# Patient Record
Sex: FEMALE | Race: WHITE | NOT HISPANIC OR LATINO | Employment: OTHER | URBAN - METROPOLITAN AREA
[De-identification: names, ages, dates, MRNs, and addresses within clinical notes are randomized per-mention and may not be internally consistent; named-entity substitution may affect disease eponyms.]

---

## 2017-01-25 ENCOUNTER — OFFICE VISIT (OUTPATIENT)
Dept: DERMATOLOGY | Facility: CLINIC | Age: 62
End: 2017-01-25

## 2017-01-25 DIAGNOSIS — R21 RASH: Primary | ICD-10-CM

## 2017-01-25 DIAGNOSIS — R21 RASH: ICD-10-CM

## 2017-01-25 RX ORDER — ACYCLOVIR 800 MG/1
TABLET ORAL
COMMUNITY
Start: 2016-09-17 | End: 2017-05-05

## 2017-01-25 ASSESSMENT — PAIN SCALES - GENERAL: PAINLEVEL: NO PAIN (0)

## 2017-01-25 NOTE — MR AVS SNAPSHOT
After Visit Summary   1/25/2017    Ludy Ramos    MRN: [id number]           Patient Information     Date Of Birth          1955        Visit Information        Provider Department      1/25/2017 1:30 PM Diamond Linares PA-C [facility] Dermatology        Today's Diagnoses     Rash    -  1       Care Instructions    Cryotherapy    What is it?    Use of a very cold liquid, such as liquid nitrogen, to freeze and destroy abnormal skin cells that need to be removed    What should I expect?    Tenderness and redness    A small blister that might grow and fill with dark purple blood. There may be crusting.    More than one treatment may be needed if the lesions do not go away.    How do I care for the treated area?    Gently wash the area with your hands when bathing.    Use a thin layer of Vaseline to help with healing. You may use a Band-Aid.     The area should heal within 7-10 days and may leave behind a pink or lighter color.     Do not use an antibiotic or Neosporin ointment.     You may take acetaminophen (Tylenol) for pain.     Call your Doctor if you have:    Severe pain    Signs of infection (warmth, redness, cloudy yellow drainage, and or a bad smell)    Questions or concerns    Who should I call with questions?       [facility]: [phone]       [facility]: [phone]       For urgent needs outside of business hours call the [facility] at [phone]        and ask for the dermatology resident on call          Follow-ups after your visit        Your next 10 appointments already scheduled     Jan 25, 2017  2:15 PM   LAB with [facility] ([facility])    [address] [place] [address] [phone]           Patient must bring picture ID.  Patient should be prepared to give a urine specimen  Please do not eat 10-12 hours before your appointment if  you are coming in fasting for labs on lipids, cholesterol, or glucose (sugar).  Pregnant women should follow their Care Team instructions. Water with medications is okay. Do not drink coffee or other fluids.   If you have concerns about taking  your medications, please ask at office or if scheduling via [employer], send a message by clicking on Secure Messaging, Message Your Care Team.              Future tests that were ordered for you today     Open Future Orders        Priority Expected Expires Ordered    HSV IgM antibody Routine  1/26/2018 1/25/2017    Herpes Simplex Virus 1 and 2 IgG Routine  1/26/2018 1/25/2017            Who to contact     Please call your clinic at [phone] to:    Ask questions about your health    Make or cancel appointments    Discuss your medicines    Learn about your test results    Speak to your doctor   If you have compliments or concerns about an experience at your clinic, or if you wish to file a complaint, please contact [facility] Patient Relations at [phone] or email us at [email or web address]         Additional Information About Your Visit        [employer] Information     [employer] gives you secure access to your electronic health record. If you see a primary care provider, you can also send messages to your care team and make appointments. If you have questions, please call your primary care clinic.  If you do not have a primary care provider, please call [phone] and they will assist you.      [employer] is an electronic gateway that provides easy, online access to your medical records. With [employer], you can request a clinic appointment, read your test results, renew a prescription or communicate with your care team.     To access your existing account, please contact your [facility] or call [phone] for assistance.        Care EveryWhere ID     This is your Care EveryWhere ID. This could be used by  other organizations to access your [place] medical records  [id number]         Blood Pressure from Last 3 Encounters:   12/07/16 136/84   10/31/16 115/71   09/15/16 138/86    Weight from Last 3 Encounters:   12/07/16 78.472 kg (173 lb)   10/31/16 76.658 kg (169 lb)   09/15/16 76.658 kg (169 lb)              We Performed the Following     Skin Culture Aerobic Bacterial     Viral culture        Primary Care Provider Office Phone # Fax #    Javan Canela [phone]       [facility] [address] [facility] [address]        Thank you!     Thank you for choosing [facility] DERMATOLOGY  for your care. Our goal is always to provide you with excellent care. Hearing back from our patients is one way we can continue to improve our services. Please take a few minutes to complete the written survey that you may receive in the mail after your visit with us. Thank you!             Your Updated Medication List - Protect others around you: Learn how to safely use, store and throw away your medicines at www.disposemymeds.org.          This list is accurate as of: 1/25/17  2:01 PM.  Always use your most recent med list.                   Brand Name Dispense Instructions for use    acyclovir 800 MG tablet    ZOVIRAX         albuterol 108 (90 BASE) MCG/ACT Inhaler    PROAIR HFA/PROVENTIL HFA/VENTOLIN HFA    1 Inhaler    Inhale 2 puffs into the lungs every 4 hours as needed for shortness of breath / dyspnea       citalopram 20 MG tablet    celeXA    90 tablet    Take 1 tablet (20 mg) by mouth daily       hydrochlorothiazide 25 MG tablet    HYDRODIURIL    90 tablet    Take 1 tablet (25 mg) by mouth daily       lisinopril 10 MG tablet    PRINIVIL/ZESTRIL    90 tablet    Take 1 tablet (10 mg) by mouth daily       * mometasone-formoterol 100-5 MCG/ACT oral inhaler    DULERA    1 Inhaler    Inhale 2 puffs into the lungs 2 times daily       * mometasone-formoterol 200-5 MCG/ACT oral inhaler     DULERA    13 g    Inhale 2 puffs into the lungs 2 times daily       traZODone 100 MG tablet    DESYREL    180 tablet    Take 2 tablets (200 mg) by mouth At Bedtime       * Notice:  This list has 2 medication(s) that are the same as other medications prescribed for you. Read the directions carefully, and ask your doctor or other care provider to review them with you.

## 2017-01-25 NOTE — LETTER
1/25/2017      RE: Ludy Ramos  [address] [place] [facility] [address]       PT was seen for a rash. Note is unavailable.     Diamond Linares PA-C

## 2017-01-25 NOTE — PATIENT INSTRUCTIONS
Cryotherapy    What is it?    Use of a very cold liquid, such as liquid nitrogen, to freeze and destroy abnormal skin cells that need to be removed    What should I expect?    Tenderness and redness    A small blister that might grow and fill with dark purple blood. There may be crusting.    More than one treatment may be needed if the lesions do not go away.    How do I care for the treated area?    Gently wash the area with your hands when bathing.    Use a thin layer of Vaseline to help with healing. You may use a Band-Aid.     The area should heal within 7-10 days and may leave behind a pink or lighter color.     Do not use an antibiotic or Neosporin ointment.     You may take acetaminophen (Tylenol) for pain.     Call your Doctor if you have:    Severe pain    Signs of infection (warmth, redness, cloudy yellow drainage, and or a bad smell)    Questions or concerns    Who should I call with questions?       [facility]: [phone]       [facility]: [phone]       For urgent needs outside of business hours call the [facility] at [phone]        and ask for the dermatology resident on call

## 2017-01-25 NOTE — NURSING NOTE
Dermatology Rooming Note    Ludy Ramos's goals for this visit include:   Chief Complaint   Patient presents with     Derm Problem     Ludy comes to clinic for areas of irritation on her face     Mariama Lacey LPN

## 2017-01-25 NOTE — LETTER
1/25/2017       RE: Ludy Ramos  [address]  [place] [facility] [address]     Dear Colleague,    Thank you for referring your patient, Ludy Ramos, to the [facility] DERMATOLOGY at [facility]. Please see a copy of my visit note below.    PT was seen for a rash. Note is unavailable.     Again, thank you for allowing me to participate in the care of your patient.      Sincerely,    Diamond Linares PA-C

## 2017-01-26 LAB
HSV1 IGG SERPL QL IA: 8 AI (ref 0–0.8)
HSV2 IGG SERPL QL IA: ABNORMAL AI (ref 0–0.8)

## 2017-01-27 LAB
BACTERIA SPEC CULT: NORMAL
Lab: NORMAL
MICRO REPORT STATUS: NORMAL
SPECIMEN SOURCE: NORMAL

## 2017-01-31 LAB — HSV 1+2 IGM SER-IMP: 0.64 INDEX VALUE (ref 0–0.89)

## 2017-02-01 LAB
SPECIMEN SOURCE: NORMAL
STATUS - QUEST: NORMAL
VIRUS SPEC CULT: NORMAL

## 2017-02-02 DIAGNOSIS — L71.0 DERMATITIS, PERIORAL: Primary | ICD-10-CM

## 2017-02-02 RX ORDER — MINOCYCLINE HYDROCHLORIDE 100 MG/1
100 CAPSULE ORAL 2 TIMES DAILY
Qty: 60 CAPSULE | Refills: 2 | Status: SHIPPED | OUTPATIENT
Start: 2017-02-02 | End: 2018-06-08

## 2017-02-09 ENCOUNTER — TELEPHONE (OUTPATIENT)
Dept: FAMILY MEDICINE | Facility: CLINIC | Age: 62
End: 2017-02-09

## 2017-02-09 DIAGNOSIS — Z86.0100 HISTORY OF COLONIC POLYPS: Primary | ICD-10-CM

## 2017-02-09 NOTE — TELEPHONE ENCOUNTER
Patient is calling to check with pcp if she is due for a colonoscopy and can he put in referral for this  Please call to discuss   Thank you

## 2017-02-09 NOTE — TELEPHONE ENCOUNTER
Patient is due for colonoscopy in May of 2017 pended referral, please sign and route back to WILIAM HERNANDEZ for TC to contact ptNathan Fishman,

## 2017-03-27 DIAGNOSIS — R10.13 ABDOMINAL PAIN, EPIGASTRIC: ICD-10-CM

## 2017-03-27 DIAGNOSIS — I10 ESSENTIAL HYPERTENSION WITH GOAL BLOOD PRESSURE LESS THAN 140/90: ICD-10-CM

## 2017-03-27 RX ORDER — HYDROCHLOROTHIAZIDE 25 MG/1
25 TABLET ORAL DAILY
Qty: 90 TABLET | Refills: 0 | Status: SHIPPED | OUTPATIENT
Start: 2017-03-27 | End: 2017-09-20

## 2017-03-27 NOTE — TELEPHONE ENCOUNTER
Routing refill request to provider for review/approval because:  Drug not active on patient's medication list.     Medication could not be refilled per [facility] RN protocol.   Chanda Honeycutt RN   [facility]

## 2017-03-30 ENCOUNTER — OFFICE VISIT (OUTPATIENT)
Dept: FAMILY MEDICINE | Facility: CLINIC | Age: 62
End: 2017-03-30
Payer: COMMERCIAL

## 2017-03-30 VITALS
WEIGHT: 176.6 LBS | HEART RATE: 104 BPM | TEMPERATURE: 98.1 F | OXYGEN SATURATION: 95 % | BODY MASS INDEX: 27.66 KG/M2 | DIASTOLIC BLOOD PRESSURE: 69 MMHG | SYSTOLIC BLOOD PRESSURE: 106 MMHG

## 2017-03-30 DIAGNOSIS — J06.9 UPPER RESPIRATORY TRACT INFECTION, UNSPECIFIED TYPE: Primary | ICD-10-CM

## 2017-03-30 PROBLEM — Z71.89 ADVANCED DIRECTIVES, COUNSELING/DISCUSSION: Status: ACTIVE | Noted: 2017-03-30

## 2017-03-30 PROCEDURE — 99213 OFFICE O/P EST LOW 20 MIN: CPT | Performed by: FAMILY MEDICINE

## 2017-03-30 RX ORDER — AZITHROMYCIN 250 MG/1
TABLET, FILM COATED ORAL
Qty: 6 TABLET | Refills: 0 | Status: SHIPPED | OUTPATIENT
Start: 2017-03-30 | End: 2017-04-26

## 2017-03-30 ASSESSMENT — ENCOUNTER SYMPTOMS
RHINORRHEA: 1
WHEEZING: 1
CHILLS: 0
SHORTNESS OF BREATH: 0
COUGH: 1
SORE THROAT: 1
SWEATS: 1

## 2017-03-30 NOTE — NURSING NOTE
"Chief Complaint   Patient presents with     Cough     x 2 weeks, nausea        Initial /69  Pulse 104  Temp 98.1  F (36.7  C) (Oral)  Wt 176 lb 9.6 oz (80.1 kg)  SpO2 95%  BMI 27.66 kg/m2 Estimated body mass index is 27.66 kg/(m^2) as calculated from the following:    Height as of 3/24/16: 5' 7\" (1.702 m).    Weight as of this encounter: 176 lb 9.6 oz (80.1 kg).  Medication Reconciliation: complete  Ezra Downs CMA    "

## 2017-03-30 NOTE — MR AVS SNAPSHOT
After Visit Summary   3/30/2017    Ludy Ramos    MRN: [id number]           Patient Information     Date Of Birth          1955        Visit Information        Provider Department      3/30/2017 2:00 PM Javan Canela MD [facility]        Today's Diagnoses     Upper respiratory tract infection, unspecified type    -  1       Follow-ups after your visit        Your next 10 appointments already scheduled     May 05, 2017   Procedure with William Charles Duane, MD   [facility] (--)    [address] [place] [address] [phone]              Who to contact     If you have questions or need follow up information about today's clinic visit or your schedule please contact [facility] directly at [phone].  Normal or non-critical lab and imaging results will be communicated to you by MyChart, letter or phone within 4 business days after the clinic has received the results. If you do not hear from us within 7 days, please contact the clinic through MyChart or phone. If you have a critical or abnormal lab result, we will notify you by phone as soon as possible.  Submit refill requests through [facility] or call your pharmacy and they will forward the refill request to us. Please allow 3 business days for your refill to be completed.          Additional Information About Your Visit        MyChart Information     [facility] gives you secure access to your electronic health record. If you see a primary care provider, you can also send messages to your care team and make appointments. If you have questions, please call your primary care clinic.  If you do not have a primary care provider, please call [phone] and they will assist you.        Care EveryWhere ID     This is your Care EveryWhere ID. This could be used by other organizations to access your [place] medical records  [id number]        Your Vitals Were     Pulse Temperature  Pulse Oximetry BMI (Body Mass Index)          104 98.1  F (36.7  C) (Oral) 95% 27.66 kg/m2         Blood Pressure from Last 3 Encounters:   03/30/17 106/69   12/07/16 136/84   10/31/16 115/71    Weight from Last 3 Encounters:   03/30/17 176 lb 9.6 oz (80.1 kg)   12/07/16 173 lb (78.5 kg)   10/31/16 169 lb (76.7 kg)              We Performed the Following     Asthma Action Plan (AAP)          Today's Medication Changes          These changes are accurate as of: 3/30/17  2:47 PM.  If you have any questions, ask your nurse or doctor.               Start taking these medicines.        Dose/Directions    azithromycin 250 MG tablet   Commonly known as:  ZITHROMAX   Used for:  Upper respiratory tract infection, unspecified type   Started by:  Javan Canela MD        Two tablets first day, then one tablet daily for four days.   Quantity:  6 tablet   Refills:  0         These medicines have changed or have updated prescriptions.        Dose/Directions    mometasone-formoterol 200-5 MCG/ACT oral inhaler   Commonly known as:  DULERA   This may have changed:  Another medication with the same name was removed. Continue taking this medication, and follow the directions you see here.   Used for:  Moderate persistent asthma, uncomplicated   Changed by:  Javan Canela MD        Dose:  2 puff   Inhale 2 puffs into the lungs 2 times daily   Quantity:  13 g   Refills:  1            Where to get your medicines      These medications were sent to [facility] [address] [place] [address] [facility]  [address] [facility] [address]     Phone:  [phone]     azithromycin 250 MG tablet                Primary Care Provider Office Phone # Fax #    Javan Canela [phone]       [facility] [address] [facility] [address]        Thank you!     Thank you for choosing [facility]  for your care. Our goal is always to provide you with excellent  care. Hearing back from our patients is one way we can continue to improve our services. Please take a few minutes to complete the written survey that you may receive in the mail after your visit with us. Thank you!             Your Updated Medication List - Protect others around you: Learn how to safely use, store and throw away your medicines at www.disposemymeds.org.          This list is accurate as of: 3/30/17  2:47 PM.  Always use your most recent med list.                   Brand Name Dispense Instructions for use    acyclovir 800 MG tablet    ZOVIRAX         albuterol 108 (90 BASE) MCG/ACT Inhaler    PROAIR HFA/PROVENTIL HFA/VENTOLIN HFA    1 Inhaler    Inhale 2 puffs into the lungs every 4 hours as needed for shortness of breath / dyspnea       azithromycin 250 MG tablet    ZITHROMAX    6 tablet    Two tablets first day, then one tablet daily for four days.       citalopram 20 MG tablet    celeXA    90 tablet    Take 1 tablet (20 mg) by mouth daily       hydrochlorothiazide 25 MG tablet    HYDRODIURIL    90 tablet    Take 1 tablet (25 mg) by mouth daily Due for follow up appointment for further refills.       lisinopril 10 MG tablet    PRINIVIL/ZESTRIL    90 tablet    Take 1 tablet (10 mg) by mouth daily       minocycline 100 MG capsule    MINOCIN/DYNACIN    60 capsule    Take 1 capsule (100 mg) by mouth 2 times daily       mometasone-formoterol 200-5 MCG/ACT oral inhaler    DULERA    13 g    Inhale 2 puffs into the lungs 2 times daily       omeprazole 20 MG CR capsule    priLOSEC    30 capsule    Take 1 capsule (20 mg) by mouth daily       traZODone 100 MG tablet    DESYREL    180 tablet    Take 2 tablets (200 mg) by mouth At Bedtime

## 2017-03-30 NOTE — LETTER
My Asthma Action Plan  Name: Ludy Ramos   YOB: 1955  Date: 3/30/2017   My doctor: Javan Canela MD   My clinic: [facility]        My Control Medicine: { :496258}  My Rescue Medicine: { :605464}  {AAP include Oral Steroid:422185} My Asthma Severity: { :881308}  Avoid your asthma triggers: { :055712}        {Is patient a child or adult?:562576:::0}       GREEN ZONE     Good Control    I feel good    No cough or wheeze    Can work, sleep and play without asthma symptoms       Take your asthma control medicine every day.     1. If exercise triggers your asthma, take your rescue medication    15 minutes before exercise or sports, and    During exercise if you have asthma symptoms  2. Spacer to use with inhaler: If you have a spacer, make sure to use it with your inhaler             YELLOW ZONE     Getting Worse  I have ANY of these:    I do not feel good    Cough or wheeze    Chest feels tight    Wake up at night   1. Keep taking your Green Zone medications  2. Start taking your rescue medicine:    every 20 minutes for up to 1 hour. Then every 4 hours for 24-48 hours.  3. If you stay in the Yellow Zone for more than 12-24 hours, contact your doctor.  4. If you do not return to the Green Zone in 12-24 hours or you get worse, start taking your oral steroid medicine if prescribed by your provider.           RED ZONE     Medical Alert - Get Help  I have ANY of these:    I feel awful    Medicine is not helping    Breathing getting harder    Trouble walking or talking    Nose opens wide to breathe       1. Take your rescue medicine NOW  2. If your provider has prescribed an oral steroid medicine, start taking it NOW  3. Call your doctor NOW  4. If you are still in the Red Zone after 20 minutes and you have not reached your doctor:    Take your rescue medicine again and    Call 911 or go to the emergency room right away    See your regular doctor within 2 weeks of an Emergency Room or  Urgent Care visit for follow-up treatment.        Electronically signed by: Ezra Rocha, March 30, 2017    Annual Reminders:  Meet with Asthma Educator,  Flu Shot in the Fall, consider Pneumonia Vaccination for patients with asthma (aged 19 and older).    Pharmacy:    [facility] [place] [place] [facility] [address] [facility]  WRITTEN PRESCRIPTION REQUESTED  [facility] [address] [facility] - [place], [place] - [address] [place] [facility]                    Asthma Triggers  How To Control Things That Make Your Asthma Worse    Triggers are things that make your asthma worse.  Look at the list below to help you find your triggers and what you can do about them.  You can help prevent asthma flare-ups by staying away from your triggers.      Trigger                                                          What you can do   Cigarette Smoke  Tobacco smoke can make asthma worse. Do not allow smoking in your home, car or around you.  Be sure no one smokes at a child s day care or school.  If you smoke, ask your health care provider for ways to help you quit.  Ask family members to quit too.  Ask your health care provider for a referral to Quit Plan to help you quit smoking, or call 3-359-202-PLAN.     Colds, Flu, Bronchitis  These are common triggers of asthma. Wash your hands often.  Don t touch your eyes, nose or mouth.  Get a flu shot every year.     Dust Mites  These are tiny bugs that live in cloth or carpet. They are too small to see. Wash sheets and blankets in hot water every week.   Encase pillows and mattress in dust mite proof covers.  Avoid having carpet if you can. If you have carpet, vacuum weekly.   Use a dust mask and HEPA vacuum.   Pollen and Outdoor Mold  Some people are allergic to trees, grass, or weed pollen, or molds. Try to keep your windows closed.  Limit time out doors when pollen count is high.   Ask  you health care provider about taking medicine during allergy season.     Animal Dander  Some people are allergic to skin flakes, urine or saliva from pets with fur or feathers. Keep pets with fur or feathers out of your home.    If you can t keep the pet outdoors, then keep the pet out of your bedroom.  Keep the bedroom door closed.  Keep pets off cloth furniture and away from stuffed toys.     Mice, Rats, and Cockroaches  Some people are allergic to the waste from these pests.   Cover food and garbage.  Clean up spills and food crumbs.  Store grease in the refrigerator.   Keep food out of the bedroom.   Indoor Mold  This can be a trigger if your home has high moisture. Fix leaking faucets, pipes, or other sources of water.   Clean moldy surfaces.  Dehumidify basement if it is damp and smelly.   Smoke, Strong Odors, and Sprays  These can reduce air quality. Stay away from strong odors and sprays, such as perfume, powder, hair spray, paints, smoke incense, paint, cleaning products, candles and new carpet.   Exercise or Sports  Some people with asthma have this trigger. Be active!  Ask your doctor about taking medicine before sports or exercise to prevent symptoms.    Warm up for 5-10 minutes before and after sports or exercise.     Other Triggers of Asthma  Cold air:  Cover your nose and mouth with a scarf.  Sometimes laughing or crying can be a trigger.  Some medicines and food can trigger asthma.

## 2017-03-30 NOTE — PROGRESS NOTES
Cough   This is a new problem. Episode onset: 2 weeks. The cough is productive of sputum. Associated symptoms include sweats, rhinorrhea, sore throat and wheezing. Pertinent negatives include no chills and no shortness of breath.         Review of Systems   Constitutional: Negative for chills.   HENT: Positive for rhinorrhea and sore throat.    Respiratory: Positive for cough and wheezing. Negative for shortness of breath.      OBJECTIVE:  no apparent distress  /69  Pulse 104  Temp 98.1  F (36.7  C) (Oral)  Wt 176 lb 9.6 oz (80.1 kg)  SpO2 95%  BMI 27.66 kg/m2       Physical Exam   Constitutional: She is well-developed, well-nourished, and in no distress.   HENT:   Head: Normocephalic and atraumatic.   Right Ear: External ear normal.   Left Ear: External ear normal.   Cardiovascular: Normal rate, regular rhythm and normal heart sounds.    Pulmonary/Chest: Effort normal and breath sounds normal.         ICD-10-CM    1. Upper respiratory tract infection, unspecified type J06.9 azithromycin (ZITHROMAX) 250 MG tablet    PLAN:Retun to clinic.

## 2017-04-07 DIAGNOSIS — I10 ESSENTIAL HYPERTENSION WITH GOAL BLOOD PRESSURE LESS THAN 140/90: ICD-10-CM

## 2017-04-07 RX ORDER — LISINOPRIL 10 MG/1
10 TABLET ORAL DAILY
Qty: 30 TABLET | Refills: 0 | Status: SHIPPED | OUTPATIENT
Start: 2017-04-07 | End: 2017-04-25

## 2017-04-25 ENCOUNTER — MYC MEDICAL ADVICE (OUTPATIENT)
Dept: FAMILY MEDICINE | Facility: CLINIC | Age: 62
End: 2017-04-25

## 2017-04-25 DIAGNOSIS — I10 ESSENTIAL HYPERTENSION WITH GOAL BLOOD PRESSURE LESS THAN 140/90: ICD-10-CM

## 2017-04-25 DIAGNOSIS — F41.9 ANXIETY: ICD-10-CM

## 2017-04-25 NOTE — TELEPHONE ENCOUNTER
Patient has not been seen for anxiety in over 1 year.  HTN not addressed at last office visit a few weeks ago although BP was good.    Routing to provider to advise.  Aisha Izquierdo RN

## 2017-04-26 DIAGNOSIS — I10 ESSENTIAL HYPERTENSION WITH GOAL BLOOD PRESSURE LESS THAN 140/90: ICD-10-CM

## 2017-04-26 RX ORDER — CITALOPRAM HYDROBROMIDE 20 MG/1
20 TABLET ORAL DAILY
Qty: 90 TABLET | Refills: 0 | Status: SHIPPED | OUTPATIENT
Start: 2017-04-26 | End: 2017-06-28

## 2017-04-26 RX ORDER — HYDROCHLOROTHIAZIDE 25 MG/1
25 TABLET ORAL DAILY
Qty: 90 TABLET | Refills: 0 | Status: CANCELLED | OUTPATIENT
Start: 2017-04-26

## 2017-04-26 RX ORDER — LISINOPRIL 10 MG/1
10 TABLET ORAL DAILY
Qty: 90 TABLET | Refills: 0 | Status: SHIPPED | OUTPATIENT
Start: 2017-04-26 | End: 2017-09-20

## 2017-04-26 RX ORDER — LISINOPRIL 10 MG/1
TABLET ORAL
Qty: 30 TABLET | Refills: 0 | OUTPATIENT
Start: 2017-04-26

## 2017-05-05 ENCOUNTER — SURGERY (OUTPATIENT)
Age: 62
End: 2017-05-05

## 2017-05-05 ENCOUNTER — HOSPITAL ENCOUNTER (OUTPATIENT)
Facility: AMBULATORY SURGERY CENTER | Age: 62
Discharge: HOME | End: 2017-05-05
Attending: INTERNAL MEDICINE | Admitting: INTERNAL MEDICINE
Payer: COMMERCIAL

## 2017-05-05 VITALS
TEMPERATURE: 98.3 F | SYSTOLIC BLOOD PRESSURE: 102 MMHG | OXYGEN SATURATION: 95 % | RESPIRATION RATE: 13 BRPM | DIASTOLIC BLOOD PRESSURE: 69 MMHG

## 2017-05-05 PROCEDURE — 88305 TISSUE EXAM BY PATHOLOGIST: CPT | Performed by: INTERNAL MEDICINE

## 2017-05-05 PROCEDURE — G8907 PT DOC NO EVENTS ON DISCHARG: HCPCS

## 2017-05-05 PROCEDURE — 45380 COLONOSCOPY AND BIOPSY: CPT

## 2017-05-05 PROCEDURE — G8918 PT W/O PREOP ORDER IV AB PRO: HCPCS

## 2017-05-05 RX ORDER — ONDANSETRON 2 MG/ML
4 INJECTION INTRAMUSCULAR; INTRAVENOUS
Status: DISCONTINUED | OUTPATIENT
Start: 2017-05-05 | End: 2017-05-06 | Stop reason: HOSPADM

## 2017-05-05 RX ORDER — LIDOCAINE 40 MG/G
CREAM TOPICAL
Status: DISCONTINUED | OUTPATIENT
Start: 2017-05-05 | End: 2017-05-06 | Stop reason: HOSPADM

## 2017-05-05 RX ORDER — FENTANYL CITRATE 50 UG/ML
INJECTION, SOLUTION INTRAMUSCULAR; INTRAVENOUS PRN
Status: DISCONTINUED | OUTPATIENT
Start: 2017-05-05 | End: 2017-05-05 | Stop reason: HOSPADM

## 2017-05-05 RX ADMIN — FENTANYL CITRATE 100 MCG: 50 INJECTION, SOLUTION INTRAMUSCULAR; INTRAVENOUS at 09:35

## 2017-05-05 RX ADMIN — FENTANYL CITRATE 50 MCG: 50 INJECTION, SOLUTION INTRAMUSCULAR; INTRAVENOUS at 09:45

## 2017-05-09 LAB — COPATH REPORT: NORMAL

## 2017-05-17 ENCOUNTER — TELEPHONE (OUTPATIENT)
Dept: DERMATOLOGY | Facility: CLINIC | Age: 62
End: 2017-05-17

## 2017-05-17 NOTE — TELEPHONE ENCOUNTER
----- Message from Diamond Linares PA-C sent at 5/17/2017  7:09 AM CDT -----  Yes,   Please. I had a message for this patient. I wanted to see her back after a month of medication.    Thanks, Diamond  ----- Message -----     From: Francine Montoya CMA     Sent: 5/16/2017   3:36 PM       To: CARISSA Urbina, You seen this patient back in January and she had a culture done and the results were sent via [employer] and she still has not read them. Would you like for us to follow up with the patient and give her the results or so you think it is fine?

## 2017-05-17 NOTE — TELEPHONE ENCOUNTER
I called Ludy to follow up with her culture results that were sent via [employer] but not read. Ludy says she is doing fine and also declined a follow up appointment.

## 2017-05-24 DIAGNOSIS — G47.00 PERSISTENT INSOMNIA: ICD-10-CM

## 2017-05-25 RX ORDER — TRAZODONE HYDROCHLORIDE 100 MG/1
TABLET ORAL
Qty: 180 TABLET | Refills: 0 | Status: SHIPPED | OUTPATIENT
Start: 2017-05-25 | End: 2017-06-28

## 2017-05-31 LAB — COLONOSCOPY: NORMAL

## 2017-06-28 ENCOUNTER — OFFICE VISIT (OUTPATIENT)
Dept: FAMILY MEDICINE | Facility: CLINIC | Age: 62
End: 2017-06-28
Payer: COMMERCIAL

## 2017-06-28 VITALS
OXYGEN SATURATION: 96 % | SYSTOLIC BLOOD PRESSURE: 133 MMHG | DIASTOLIC BLOOD PRESSURE: 87 MMHG | TEMPERATURE: 97 F | BODY MASS INDEX: 27.4 KG/M2 | HEIGHT: 67 IN | WEIGHT: 174.6 LBS | HEART RATE: 99 BPM

## 2017-06-28 DIAGNOSIS — J45.40 MODERATE PERSISTENT ASTHMA, UNCOMPLICATED: ICD-10-CM

## 2017-06-28 DIAGNOSIS — I10 ESSENTIAL HYPERTENSION WITH GOAL BLOOD PRESSURE LESS THAN 140/90: ICD-10-CM

## 2017-06-28 DIAGNOSIS — F41.9 ANXIETY: ICD-10-CM

## 2017-06-28 DIAGNOSIS — G47.00 PERSISTENT INSOMNIA: ICD-10-CM

## 2017-06-28 PROCEDURE — 99214 OFFICE O/P EST MOD 30 MIN: CPT | Performed by: FAMILY MEDICINE

## 2017-06-28 RX ORDER — CITALOPRAM HYDROBROMIDE 20 MG/1
20 TABLET ORAL DAILY
Qty: 90 TABLET | Refills: 1 | Status: SHIPPED | OUTPATIENT
Start: 2017-06-28 | End: 2018-02-23

## 2017-06-28 RX ORDER — ALBUTEROL SULFATE 90 UG/1
2 AEROSOL, METERED RESPIRATORY (INHALATION) EVERY 4 HOURS PRN
Qty: 1 INHALER | Refills: 3 | Status: SHIPPED | OUTPATIENT
Start: 2017-06-28 | End: 2018-07-11

## 2017-06-28 RX ORDER — TRAZODONE HYDROCHLORIDE 100 MG/1
TABLET ORAL
Qty: 180 TABLET | Refills: 0 | Status: SHIPPED | OUTPATIENT
Start: 2017-06-28 | End: 2017-09-25

## 2017-06-28 RX ORDER — HYDROCHLOROTHIAZIDE 25 MG/1
25 TABLET ORAL DAILY
Qty: 90 TABLET | Refills: 0 | Status: CANCELLED | OUTPATIENT
Start: 2017-06-28

## 2017-06-28 RX ORDER — LISINOPRIL/HYDROCHLOROTHIAZIDE 10-12.5 MG
1 TABLET ORAL DAILY
Qty: 90 TABLET | Refills: 1 | Status: SHIPPED | OUTPATIENT
Start: 2017-06-28 | End: 2017-10-02

## 2017-06-28 RX ORDER — LISINOPRIL 10 MG/1
10 TABLET ORAL DAILY
Qty: 90 TABLET | Refills: 0 | Status: CANCELLED | OUTPATIENT
Start: 2017-06-28

## 2017-06-28 ASSESSMENT — ANXIETY QUESTIONNAIRES
GAD7 TOTAL SCORE: 0
6. BECOMING EASILY ANNOYED OR IRRITABLE: NOT AT ALL
2. NOT BEING ABLE TO STOP OR CONTROL WORRYING: NOT AT ALL
IF YOU CHECKED OFF ANY PROBLEMS ON THIS QUESTIONNAIRE, HOW DIFFICULT HAVE THESE PROBLEMS MADE IT FOR YOU TO DO YOUR WORK, TAKE CARE OF THINGS AT HOME, OR GET ALONG WITH OTHER PEOPLE: NOT DIFFICULT AT ALL
3. WORRYING TOO MUCH ABOUT DIFFERENT THINGS: NOT AT ALL
1. FEELING NERVOUS, ANXIOUS, OR ON EDGE: NOT AT ALL
7. FEELING AFRAID AS IF SOMETHING AWFUL MIGHT HAPPEN: NOT AT ALL
5. BEING SO RESTLESS THAT IT IS HARD TO SIT STILL: NOT AT ALL

## 2017-06-28 ASSESSMENT — PATIENT HEALTH QUESTIONNAIRE - PHQ9: 5. POOR APPETITE OR OVEREATING: NOT AT ALL

## 2017-06-28 NOTE — PROGRESS NOTES
SUBJECTIVE:  61 year oldyear old female enters with a hx of hypretension.  Pt. Has been compliant with medications and medications were reviewed.  No side effects. No chest pain or sob. Low sodium diet.    Current Outpatient Prescriptions:      traZODone (DESYREL) 100 MG tablet, TAKE TWO TABLETS BY MOUTH AT BEDTIME, Disp: 180 tablet, Rfl: 0     lisinopril (PRINIVIL/ZESTRIL) 10 MG tablet, Take 1 tablet (10 mg) by mouth daily, Disp: 90 tablet, Rfl: 0     citalopram (CELEXA) 20 MG tablet, Take 1 tablet (20 mg) by mouth daily, Disp: 90 tablet, Rfl: 0     omeprazole (PRILOSEC) 20 MG CR capsule, Take 1 capsule (20 mg) by mouth daily, Disp: 30 capsule, Rfl: 1     hydrochlorothiazide (HYDRODIURIL) 25 MG tablet, Take 1 tablet (25 mg) by mouth daily Due for follow up appointment for further refills., Disp: 90 tablet, Rfl: 0     minocycline (MINOCIN/DYNACIN) 100 MG capsule, Take 1 capsule (100 mg) by mouth 2 times daily, Disp: 60 capsule, Rfl: 2     albuterol (PROAIR HFA, PROVENTIL HFA, VENTOLIN HFA) 108 (90 BASE) MCG/ACT inhaler, Inhale 2 puffs into the lungs every 4 hours as needed for shortness of breath / dyspnea, Disp: 1 Inhaler, Rfl: 3     mometasone-formoterol (DULERA) 200-5 MCG/ACT oral inhaler, Inhale 2 puffs into the lungs 2 times daily, Disp: 13 g, Rfl: 1  Past Medical History:   Diagnosis Date     Generalized anxiety disorder      Hypertension goal BP (blood pressure) < 140/90      Insomnia      Intermittent asthma      Menopausal state      [facility]      Hospital Outpatient Visit on 05/05/2017   Component Date Value Ref Range Status     Copath Report 05/05/2017    Final                    Value:Patient Name: BRYNN VICTOR  MR#: [id number]  Specimen #: [id number]  Collected: 5/5/2017  Received: 5/8/2017  Reported: 5/9/2017 11:45  Ordering Phy(s): WILLIAM CHARLES DUANE    For improved result formatting, select 'View Enhanced Report Format'  under Linked Documents section.    SPECIMEN(S):  A: Splenic flexure  "polyp  B: Hepatic flexure polyp    FINAL DIAGNOSIS:  A. COLON, SPLENIC FLEXURE POLYP, POLYPECTOMY:  - Tubular adenoma, fragmented  - Negative for high-grade dysplasia and invasive malignancy    B. COLON, HEPATIC FLEXURE POLYP x 2, POLYPECTOMIES:  - Tubular adenoma, fragmented  - Negative for high-grade dysplasia and invasive malignancy    I have personally reviewed all specimens and or slides, including the  listed special stains, and used them with my medical judgement to  determine the final diagnosis.    Electronically signed out by:    Javan Bertrand M.D    CLINICAL HISTORY:  High risk colon cancer surveillance: Personal history of colonic polyps    GROSS:  A. The spe                          cimen is received in formalin with proper patient's  identification, labeled \"splenic flexure polyp\" and consists of three  tan soft tissue fragments measuring 0.3 cm, 0.2 cm and 0.1 cm in maximum  dimension. Entirely submitted in one cassette.    B. The specimen is received in formalin with proper patient's  identification, labeled \"hepatic flexure polyp\" and consists of two  brown tan soft tissue fragments measuring 0.4 cm and 0.3 cm in maximal  dimension. Entirely submitted in one cassette. (Dictated by: Alfonso Mcconnell 5/8/2017 08:42 AM)    MICROSCOPIC:  Microscopic examination was performed.    CPT Codes:  A: 14770-KH9  B: 21165-TJ7    TESTING LAB LOCATION:  [facility], [address]   [address]  [phone]    COLLECTION SITE:  Client: [facility]  Location: [facility] (B)       COLONOSCOPY 05/05/2017    Corrected                    Value:[facility]  Endoscopy Department-[place]  _______________________________________________________________________________  Patient Name: Ludy Ramos           Procedure Date: 5/5/2017 9:24 AM  MRN: [id number]                       Date of " Birth: 1955  Admit Type: Outpatient                Age: 61  Gender: Female                        Note Status: Supervisor Override  Attending MD: William C. Duane ,      Total Sedation Time:   _______________________________________________________________________________     Procedure:                Colonoscopy  Indications:              High risk colon cancer surveillance: Personal                             history of colonic polyps  Providers:                William C. Duane, Toshia Mckeon RN  Referring MD:             Javan Canela MD  Medicines:                Fentanyl 150 micrograms IV, Midazolam 3 mg IV  Complications:            No immediate complications.  ______________________________________________                          _________________________________  Procedure:                Pre-Anesthesia Assessment:                            - Prior to the procedure, a History and Physical                             was performed, and patient medications and                             allergies were reviewed. The risks and benefits of                             the procedure and the sedation options and risks                             were discussed with the patient. All questions were                             answered and informed consent was obtained. Patient                             identification and proposed procedure were verified                             by the physician and the nurse in the pre-procedure                             area. Mental Status Examination: alert and                             oriented. Airway Examination: normal oropharyngeal                             airway and neck mobility. Respiratory Examination:                             clear to auscultation. CV Examin                          ation: normal.                             Prophylactic Antibiotics: The patient does not                             require prophylactic antibiotics. Prior                              Anticoagulants: The patient has taken no previous                             anticoagulant or antiplatelet agents. ASA Grade                             Assessment: II - A patient with mild systemic                             disease. After reviewing the risks and benefits,                             the patient was deemed in satisfactory condition to                             undergo the procedure. The anesthesia plan was to                             use moderate sedation / analgesia (conscious                             sedation). This assessment was completed before the                             administration of sedation at 09:20 AM.                            After obtaining informed consent, the colonoscope                             was passed under direct vision. Throughout the                                                       procedure, the patient's blood pressure, pulse, and                             oxygen saturations were monitored continuously. The                             Colonoscope was introduced through the anus and                             advanced to the terminal ileum. The colonoscopy was                             somewhat difficult due to significant looping and a                             tortuous colon. The patient tolerated the procedure                             fairly well. The quality of the bowel preparation                             was good.                                                                                   Findings:       A 3 mm polyp was found in the splenic flexure. The polyp was sessile.        The polyp was removed with a cold biopsy forceps. Resection and        retrieval were complete.       Two sessile polyps were found in the hepatic flexure. The polyps were 3        to 4 mm in size. These polyps were removed with a cold bio                          psy forceps.        Resection and retrieval were complete.         "                                                                           Impression:               - One 3 mm polyp at the splenic flexure, removed                             with a cold biopsy forceps. Resected and retrieved.                            - Two 3 to 4 mm polyps at the hepatic flexure,                             removed with a cold biopsy forceps. Resected and                             retrieved.  Recommendation:           - Await pathology results.                            - If the pathology report reveals adenomatous                             tissue in all three polyps, then repeat the                             colonoscopy for surveillance in 3 years. Otherwise                             repeat in 5 years. Recommend doing that procedure                             under monitored anesthesia care (MAC)                                                                                     ________                          __________  William C. Duane,   5/5/2017 10:23:35 AM  I was physically present for the entire viewing portion of the exam.William Duane  Number of Addenda: 0    Note Initiated On: 5/5/2017 9:24 AM  Scope In:  Scope Out:        Reviewed health maintenance  Patient Active Problem List   Diagnosis     Insomnia     CARDIOVASCULAR SCREENING; LDL GOAL LESS THAN 130     Hypertension goal BP (blood pressure) < 140/90     Anxiety     Moderate persistent asthma     History of colonic polyps     Clavicle fracture     Closed displaced fracture of proximal phalanx of left great toe     Chest wall pain     Thoracic back pain     Advanced directives, counseling/discussion         OBJECTIVE:  no apparent distress  /87  Pulse 99  Temp 97  F (36.1  C) (Oral)  Ht 5' 7\" (1.702 m)  Wt 174 lb 9.6 oz (79.2 kg)  SpO2 96%  BMI 27.35 kg/m2     Head: Normocephalic. No masses, lesions, tenderness or abnormalities.  Neck::Neck supple. No adenopathy. Thyroid symmetric, normal " "size.    Cardiovascular: negative. No lifts, heaves, or thrills. RRR. No murmurs, clicks gallops or rubs  Respiratory. Good diaphragmatic excursion. Lungs clear  Gastrointestinal:Abdomen soft, non-tender.  No masses, organomegaly    Hospital Outpatient Visit on 05/05/2017   Component Date Value Ref Range Status     Copath Report 05/05/2017    Final                    Value:Patient Name: BRYNN VICTOR  MR#: [id number]  Specimen #: [id number]  Collected: 5/5/2017  Received: 5/8/2017  Reported: 5/9/2017 11:45  Ordering Phy(s): WILLIAM CHARLES DUANE    For improved result formatting, select 'View Enhanced Report Format'  under Linked Documents section.    SPECIMEN(S):  A: Splenic flexure polyp  B: Hepatic flexure polyp    FINAL DIAGNOSIS:  A. COLON, SPLENIC FLEXURE POLYP, POLYPECTOMY:  - Tubular adenoma, fragmented  - Negative for high-grade dysplasia and invasive malignancy    B. COLON, HEPATIC FLEXURE POLYP x 2, POLYPECTOMIES:  - Tubular adenoma, fragmented  - Negative for high-grade dysplasia and invasive malignancy    I have personally reviewed all specimens and or slides, including the  listed special stains, and used them with my medical judgement to  determine the final diagnosis.    Electronically signed out by:    Javan Bertrand M.D    CLINICAL HISTORY:  High risk colon cancer surveillance: Personal history of colonic polyps    GROSS:  A. The spe                          cimen is received in formalin with proper patient's  identification, labeled \"splenic flexure polyp\" and consists of three  tan soft tissue fragments measuring 0.3 cm, 0.2 cm and 0.1 cm in maximum  dimension. Entirely submitted in one cassette.    B. The specimen is received in formalin with proper patient's  identification, labeled \"hepatic flexure polyp\" and consists of two  brown tan soft tissue fragments measuring 0.4 cm and 0.3 cm in maximal  dimension. Entirely submitted in one cassette. (Dictated by: Alfonso Mcconnell 5/8/2017 08:42 " AM)    MICROSCOPIC:  Microscopic examination was performed.    CPT Codes:  A: 27128-RD4  B: 26695-JI8    TESTING LAB LOCATION:  [facility], [address]   [address] [phone]    COLLECTION SITE:  Client: [facility] [place]  Location: [facility] (B)       COLONOSCOPY 05/05/2017    Corrected                    Value:[facility]  Endoscopy Department-[place]  _______________________________________________________________________________  Patient Name: Ludy Ramos           Procedure Date: 5/5/2017 9:24 AM  MRN: [id number]                       YOB: 1955  Admit Type: Outpatient                Age: 61  Gender: Female                        Note Status: Supervisor Override  Attending MD: William C. Duane ,      Total Sedation Time:   _______________________________________________________________________________     Procedure:                Colonoscopy  Indications:              High risk colon cancer surveillance: Personal                             history of colonic polyps  Providers:                William C. Duane, Toshia Mckeon RN  Referring MD:             Javan Canela MD  Medicines:                Fentanyl 150 micrograms IV, Midazolam 3 mg IV  Complications:            No immediate complications.  ______________________________________________                          _________________________________  Procedure:                Pre-Anesthesia Assessment:                            - Prior to the procedure, a History and Physical                             was performed, and patient medications and                             allergies were reviewed. The risks and benefits of                             the procedure and the sedation options and risks                             were discussed with the patient. All questions were                              answered and informed consent was obtained. Patient                             identification and proposed procedure were verified                             by the physician and the nurse in the pre-procedure                             area. Mental Status Examination: alert and                             oriented. Airway Examination: normal oropharyngeal                             airway and neck mobility. Respiratory Examination:                             clear to auscultation. CV Examin                          ation: normal.                             Prophylactic Antibiotics: The patient does not                             require prophylactic antibiotics. Prior                             Anticoagulants: The patient has taken no previous                             anticoagulant or antiplatelet agents. ASA Grade                             Assessment: II - A patient with mild systemic                             disease. After reviewing the risks and benefits,                             the patient was deemed in satisfactory condition to                             undergo the procedure. The anesthesia plan was to                             use moderate sedation / analgesia (conscious                             sedation). This assessment was completed before the                             administration of sedation at 09:20 AM.                            After obtaining informed consent, the colonoscope                             was passed under direct vision. Throughout the                                                       procedure, the patient's blood pressure, pulse, and                             oxygen saturations were monitored continuously. The                             Colonoscope was introduced through the anus and                             advanced to the terminal ileum. The colonoscopy was                             somewhat difficult due to significant looping and a                              tortuous colon. The patient tolerated the procedure                             fairly well. The quality of the bowel preparation                             was good.                                                                                   Findings:       A 3 mm polyp was found in the splenic flexure. The polyp was sessile.        The polyp was removed with a cold biopsy forceps. Resection and        retrieval were complete.       Two sessile polyps were found in the hepatic flexure. The polyps were 3        to 4 mm in size. These polyps were removed with a cold bio                          psy forceps.        Resection and retrieval were complete.                                                                                   Impression:               - One 3 mm polyp at the splenic flexure, removed                             with a cold biopsy forceps. Resected and retrieved.                            - Two 3 to 4 mm polyps at the hepatic flexure,                             removed with a cold biopsy forceps. Resected and                             retrieved.  Recommendation:           - Await pathology results.                            - If the pathology report reveals adenomatous                             tissue in all three polyps, then repeat the                             colonoscopy for surveillance in 3 years. Otherwise                             repeat in 5 years. Recommend doing that procedure                             under monitored anesthesia care (MAC)                                                                                     ________                          __________  William C. Duane,   5/5/2017 10:23:35 AM  I was physically present for the entire viewing portion of the exam.William Duane  Number of Addenda: 0    Note Initiated On: 5/5/2017 9:24 AM  Scope In:  Scope Out:           ICD-10-CM    1. Essential hypertension with goal blood  "pressure less than 140/90 I10    2. Anxiety F41.9 citalopram (CELEXA) 20 MG tablet   3. Moderate persistent asthma, uncomplicated J45.40 albuterol (PROAIR HFA/PROVENTIL HFA/VENTOLIN HFA) 108 (90 BASE) MCG/ACT Inhaler     mometasone-formoterol (DULERA) 200-5 MCG/ACT oral inhaler   4. Persistent insomnia G47.00 traZODone (DESYREL) 100 MG tablet    PLAN: Follow up in 6 months          SUBJECTIVE:  SUBJECTIVE:  61 year old.The patient has a history of  follow-up of depressive symptoms.    Since last visit the patient has doing well.  Notes from that visit reviewed. PHQ-9 has been reviewed.  Current symptoms include: Depressed Mood   Symptoms that have subjectively improved include: Depressed Mood  Previous and current treatment modalities employed include: Medications   Celexa (Citalopram)  Patient Active Problem List   Diagnosis     Insomnia     CARDIOVASCULAR SCREENING; LDL GOAL LESS THAN 130     Hypertension goal BP (blood pressure) < 140/90     Anxiety     Moderate persistent asthma     History of colonic polyps     Clavicle fracture     Closed displaced fracture of proximal phalanx of left great toe     Chest wall pain     Thoracic back pain     Advanced directives, counseling/discussion      Reviewed health maintenance  OBJECTIVE:  no apparent distress  /87  Pulse 99  Temp 97  F (36.1  C) (Oral)  Ht 5' 7\" (1.702 m)  Wt 174 lb 9.6 oz (79.2 kg)  SpO2 96%  BMI 27.35 kg/m2       MENTAL STATUS EXAM:   1. Clinical observations:Patient was clean and was adequately groomed. Patient's emotional presentation was cooperative and anuel/open. Patient spoke clearly and articulately. Patient maintained adequate eye contact and was cooperative in answering questions.  2. Patient appeared to be well-oriented in all spheres with coherent, logical, goal directed and relevent thinking.  3. Thought content: Denies auditory and visual hallucinations or delusions.  4. Affect and mood: Affect is alert and her emotional " attitude is described as normal.  5. Sensorium and cognition: Patient was in contact with reality and oriented to place, time, person and situation. patient demonstrated no impairment in immediate, recent, or remote memory. patient's insight was adequate without obvious deficits in intelligence.    ICD-10-CM    1. Essential hypertension with goal blood pressure less than 140/90 I10    2. Anxiety F41.9 citalopram (CELEXA) 20 MG tablet   3. Moderate persistent asthma, uncomplicated J45.40 albuterol (PROAIR HFA/PROVENTIL HFA/VENTOLIN HFA) 108 (90 BASE) MCG/ACT Inhaler     mometasone-formoterol (DULERA) 200-5 MCG/ACT oral inhaler   4. Persistent insomnia G47.00 traZODone (DESYREL) 100 MG tablet    PLAN:    :

## 2017-06-28 NOTE — MR AVS SNAPSHOT
"              After Visit Summary   6/28/2017    Ludy Ramos    MRN: [id number]           Patient Information     Date Of Birth          1955        Visit Information        Provider Department      6/28/2017 12:00 PM Javan Canela MD [facility]        Today's Diagnoses     Essential hypertension with goal blood pressure less than 140/90        Anxiety        Moderate persistent asthma, uncomplicated        Persistent insomnia           Follow-ups after your visit        Who to contact     If you have questions or need follow up information about today's clinic visit or your schedule please contact [facility] directly at [phone].  Normal or non-critical lab and imaging results will be communicated to you by Rainhart, letter or phone within 4 business days after the clinic has received the results. If you do not hear from us within 7 days, please contact the clinic through [email or web address] or phone. If you have a critical or abnormal lab result, we will notify you by phone as soon as possible.  Submit refill requests through Scientific Media or call your pharmacy and they will forward the refill request to us. Please allow 3 business days for your refill to be completed.          Additional Information About Your Visit        MyChart Information     Scient[employer] gives you secure access to your electronic health record. If you see a primary care provider, you can also send messages to your care team and make appointments. If you have questions, please call your primary care clinic.  If you do not have a primary care provider, please call [phone] and they will assist you.        Care EveryWhere ID     This is your Care EveryWhere ID. This could be used by other organizations to access your [place] medical records  [id number]        Your Vitals Were     Pulse Temperature Height Pulse Oximetry BMI (Body Mass Index)       99 97  F (36.1  C) (Oral) 5' 7\" (1.702 m) 96% 27.35 kg/m2        " Blood Pressure from Last 3 Encounters:   06/28/17 133/87   05/05/17 102/69   03/30/17 106/69    Weight from Last 3 Encounters:   06/28/17 174 lb 9.6 oz (79.2 kg)   03/30/17 176 lb 9.6 oz (80.1 kg)   12/07/16 173 lb (78.5 kg)              Today, you had the following     No orders found for display         Today's Medication Changes          These changes are accurate as of: 6/28/17 12:49 PM.  If you have any questions, ask your nurse or doctor.               Start taking these medicines.        Dose/Directions    lisinopril-hydrochlorothiazide 10-12.5 MG per tablet   Commonly known as:  PRINZIDE/ZESTORETIC   Used for:  Essential hypertension with goal blood pressure less than 140/90   Started by:  Javan Canela MD        Dose:  1 tablet   Take 1 tablet by mouth daily   Quantity:  90 tablet   Refills:  1         These medicines have changed or have updated prescriptions.        Dose/Directions    traZODone 100 MG tablet   Commonly known as:  DESYREL   This may have changed:  See the new instructions.   Used for:  Persistent insomnia   Changed by:  Javan Canela MD        TAKE TWO TABLETS BY MOUTH AT BEDTIME   Quantity:  180 tablet   Refills:  0            Where to get your medicines      These medications were sent to [facility] [address] [place] [address] [facility]  [address] [facility] [address]     Phone:  [phone]     lisinopril-hydrochlorothiazide 10-12.5 MG per tablet         Some of these will need a paper prescription and others can be bought over the counter.  Ask your nurse if you have questions.     Bring a paper prescription for each of these medications     albuterol 108 (90 BASE) MCG/ACT Inhaler    citalopram 20 MG tablet    mometasone-formoterol 200-5 MCG/ACT oral inhaler    traZODone 100 MG tablet                Primary Care Provider Office Phone # Fax #    Javan Canela [phone]       [facility] [address]  [facility] [address]        Equal Access to Services     [facility] SAMIRA : Hadii gabby resendiz elayneermias Sochaparritaali, waaxda luqadaha, qaybta kaminornoreen arriola, vasiliy alcantaranahidleonid wells. So [facility] [phone].    ATENCIÓN: Si habla español, tiene a aden disposición servicios gratuitos de asistencia lingüística. [facility] [phone].    We comply with applicable federal civil rights laws and [place] laws. We do not discriminate on the basis of race, color, national origin, age, disability sex, sexual orientation or gender identity.            Thank you!     Thank you for choosing [facility]  for your care. Our goal is always to provide you with excellent care. Hearing back from our patients is one way we can continue to improve our services. Please take a few minutes to complete the written survey that you may receive in the mail after your visit with us. Thank you!             Your Updated Medication List - Protect others around you: Learn how to safely use, store and throw away your medicines at www.disposemymeds.org.          This list is accurate as of: 6/28/17 12:49 PM.  Always use your most recent med list.                   Brand Name Dispense Instructions for use Diagnosis    albuterol 108 (90 BASE) MCG/ACT Inhaler    PROAIR HFA/PROVENTIL HFA/VENTOLIN HFA    1 Inhaler    Inhale 2 puffs into the lungs every 4 hours as needed for shortness of breath / dyspnea    Moderate persistent asthma, uncomplicated       citalopram 20 MG tablet    celeXA    90 tablet    Take 1 tablet (20 mg) by mouth daily    Anxiety       hydrochlorothiazide 25 MG tablet    HYDRODIURIL    90 tablet    Take 1 tablet (25 mg) by mouth daily Due for follow up appointment for further refills.    Essential hypertension with goal blood pressure less than 140/90       lisinopril 10 MG tablet    PRINIVIL/ZESTRIL    90 tablet    Take 1 tablet (10 mg) by mouth daily    Essential hypertension with goal blood pressure less than  140/90       lisinopril-hydrochlorothiazide 10-12.5 MG per tablet    PRINZIDE/ZESTORETIC    90 tablet    Take 1 tablet by mouth daily    Essential hypertension with goal blood pressure less than 140/90       minocycline 100 MG capsule    MINOCIN/DYNACIN    60 capsule    Take 1 capsule (100 mg) by mouth 2 times daily    Dermatitis, perioral       mometasone-formoterol 200-5 MCG/ACT oral inhaler    DULERA    39 g    Inhale 2 puffs into the lungs 2 times daily    Moderate persistent asthma, uncomplicated       omeprazole 20 MG CR capsule    priLOSEC    30 capsule    Take 1 capsule (20 mg) by mouth daily    Abdominal pain, epigastric       traZODone 100 MG tablet    DESYREL    180 tablet    TAKE TWO TABLETS BY MOUTH AT BEDTIME    Persistent insomnia

## 2017-06-28 NOTE — NURSING NOTE
"Chief Complaint   Patient presents with     Hypertension     Asthma       Initial /87  Pulse 99  Temp 97  F (36.1  C) (Oral)  Ht 5' 7\" (1.702 m)  Wt 174 lb 9.6 oz (79.2 kg)  SpO2 96%  BMI 27.35 kg/m2 Estimated body mass index is 27.35 kg/(m^2) as calculated from the following:    Height as of this encounter: 5' 7\" (1.702 m).    Weight as of this encounter: 174 lb 9.6 oz (79.2 kg).  Medication Reconciliation: complete  Ezra Downs CMA    "

## 2017-06-29 ASSESSMENT — PATIENT HEALTH QUESTIONNAIRE - PHQ9: SUM OF ALL RESPONSES TO PHQ QUESTIONS 1-9: 6

## 2017-06-29 ASSESSMENT — ANXIETY QUESTIONNAIRES: GAD7 TOTAL SCORE: 0

## 2017-06-29 ASSESSMENT — ASTHMA QUESTIONNAIRES: ACT_TOTALSCORE: 16

## 2017-07-05 ENCOUNTER — TELEPHONE (OUTPATIENT)
Dept: FAMILY MEDICINE | Facility: CLINIC | Age: 62
End: 2017-07-05

## 2017-07-05 NOTE — LETTER
[facility]  [address] [facility] [id number] [phone]    July 6, 2017      Ludy Ramos  [address] [place] [id number]      Dear Ludy,     Your clinic record indicates that you are due for an asthma update. We have a survey tool called an ACT (or Asthma Control Test) we use to measure the level of control of your asthma. Please complete the enclosed questionnaire and mail it back to us in the self-addressed stamped envelope.     If you have questions about this letter please contact your provider.     Sincerely,       Your [facility] Team

## 2017-07-05 NOTE — PATIENT INSTRUCTIONS
Patient was in to see Dr. Canela on 06-28-17 and failed their ACT by scoring 16. Please put in the failed ACT workflow for follow-up. Thank you.

## 2017-07-14 ASSESSMENT — ASTHMA QUESTIONNAIRES: ACT_TOTALSCORE: 22

## 2017-09-20 ENCOUNTER — TELEPHONE (OUTPATIENT)
Dept: FAMILY MEDICINE | Facility: CLINIC | Age: 62
End: 2017-09-20

## 2017-09-20 ENCOUNTER — OFFICE VISIT (OUTPATIENT)
Dept: FAMILY MEDICINE | Facility: CLINIC | Age: 62
End: 2017-09-20
Payer: COMMERCIAL

## 2017-09-20 VITALS
BODY MASS INDEX: 26.31 KG/M2 | HEART RATE: 93 BPM | RESPIRATION RATE: 16 BRPM | TEMPERATURE: 97.4 F | WEIGHT: 168 LBS | OXYGEN SATURATION: 93 % | SYSTOLIC BLOOD PRESSURE: 110 MMHG | DIASTOLIC BLOOD PRESSURE: 70 MMHG

## 2017-09-20 DIAGNOSIS — M26.609 TMJ (TEMPOROMANDIBULAR JOINT SYNDROME): ICD-10-CM

## 2017-09-20 DIAGNOSIS — D75.89 MACROCYTOSIS WITHOUT ANEMIA: ICD-10-CM

## 2017-09-20 DIAGNOSIS — R25.1 TREMOR: Primary | ICD-10-CM

## 2017-09-20 DIAGNOSIS — R06.02 SOB (SHORTNESS OF BREATH): ICD-10-CM

## 2017-09-20 LAB
ANION GAP SERPL CALCULATED.3IONS-SCNC: 15 MMOL/L (ref 3–14)
BASOPHILS # BLD AUTO: 0 10E9/L (ref 0–0.2)
BASOPHILS NFR BLD AUTO: 0.3 %
BUN SERPL-MCNC: 12 MG/DL (ref 7–30)
CALCIUM SERPL-MCNC: 8.9 MG/DL (ref 8.5–10.1)
CHLORIDE SERPL-SCNC: 94 MMOL/L (ref 94–109)
CO2 SERPL-SCNC: 25 MMOL/L (ref 20–32)
CREAT SERPL-MCNC: 0.88 MG/DL (ref 0.52–1.04)
DIFFERENTIAL METHOD BLD: ABNORMAL
EOSINOPHIL # BLD AUTO: 0.1 10E9/L (ref 0–0.7)
EOSINOPHIL NFR BLD AUTO: 1.9 %
ERYTHROCYTE [DISTWIDTH] IN BLOOD BY AUTOMATED COUNT: 12.1 % (ref 10–15)
FOLATE SERPL-MCNC: 4 NG/ML
GFR SERPL CREATININE-BSD FRML MDRD: 65 ML/MIN/1.7M2
GLUCOSE SERPL-MCNC: 100 MG/DL (ref 70–99)
HCT VFR BLD AUTO: 43.1 % (ref 35–47)
HGB BLD-MCNC: 15 G/DL (ref 11.7–15.7)
LYMPHOCYTES # BLD AUTO: 2 10E9/L (ref 0.8–5.3)
LYMPHOCYTES NFR BLD AUTO: 30.6 %
MCH RBC QN AUTO: 36.3 PG (ref 26.5–33)
MCHC RBC AUTO-ENTMCNC: 34.8 G/DL (ref 31.5–36.5)
MCV RBC AUTO: 104 FL (ref 78–100)
MONOCYTES # BLD AUTO: 0.8 10E9/L (ref 0–1.3)
MONOCYTES NFR BLD AUTO: 11.7 %
NEUTROPHILS # BLD AUTO: 3.6 10E9/L (ref 1.6–8.3)
NEUTROPHILS NFR BLD AUTO: 55.5 %
PLATELET # BLD AUTO: 239 10E9/L (ref 150–450)
POTASSIUM SERPL-SCNC: 4.1 MMOL/L (ref 3.4–5.3)
RBC # BLD AUTO: 4.13 10E12/L (ref 3.8–5.2)
SODIUM SERPL-SCNC: 134 MMOL/L (ref 133–144)
TSH SERPL DL<=0.005 MIU/L-ACNC: 0.58 MU/L (ref 0.4–4)
VIT B12 SERPL-MCNC: 630 PG/ML (ref 193–986)
WBC # BLD AUTO: 6.5 10E9/L (ref 4–11)

## 2017-09-20 PROCEDURE — 99214 OFFICE O/P EST MOD 30 MIN: CPT | Performed by: FAMILY MEDICINE

## 2017-09-20 PROCEDURE — 82607 VITAMIN B-12: CPT | Performed by: FAMILY MEDICINE

## 2017-09-20 PROCEDURE — 84443 ASSAY THYROID STIM HORMONE: CPT | Performed by: FAMILY MEDICINE

## 2017-09-20 PROCEDURE — 80048 BASIC METABOLIC PNL TOTAL CA: CPT | Performed by: FAMILY MEDICINE

## 2017-09-20 PROCEDURE — 85025 COMPLETE CBC W/AUTO DIFF WBC: CPT | Performed by: FAMILY MEDICINE

## 2017-09-20 PROCEDURE — 82746 ASSAY OF FOLIC ACID SERUM: CPT | Performed by: FAMILY MEDICINE

## 2017-09-20 PROCEDURE — 36415 COLL VENOUS BLD VENIPUNCTURE: CPT | Performed by: FAMILY MEDICINE

## 2017-09-20 PROCEDURE — 93000 ELECTROCARDIOGRAM COMPLETE: CPT | Performed by: FAMILY MEDICINE

## 2017-09-20 RX ORDER — PROPRANOLOL HYDROCHLORIDE 10 MG/1
10 TABLET ORAL 2 TIMES DAILY
Qty: 60 TABLET | Refills: 1 | Status: SHIPPED | OUTPATIENT
Start: 2017-09-20 | End: 2017-09-25

## 2017-09-20 NOTE — MR AVS SNAPSHOT
After Visit Summary   9/20/2017    Ludy Ramos    MRN: [id number]           Patient Information     Date Of Birth          1955        Visit Information        Provider Department      9/20/2017 11:40 AM Mary Sifuentes MD [facility]        Today's Diagnoses     Tremor    -  1    TMJ (temporomandibular joint syndrome)        SOB (shortness of breath)          Care Instructions    Please call: [facility]:   [phone]  To set up appointment for stress test.           Follow-ups after your visit        Additional Services     NEUROLOGY ADULT REFERRAL       Your provider has referred you for the following:   Consult at [facility]: [place] [facility] [place] [facility] [phone]   [email or web address]/  [facility]: [place] Hernadez [facility] [place] [phone]   [email or web address]  G: [place] [facility] [place] [phone]   [email or web address]/[email or web address]/  [facility]: [facility] [place] [phone]   [email or web address]/    Please be aware that coverage of these services is subject to the terms and limitations of your health insurance plan.  Call member services at your health plan with any benefit or coverage questions.      Please bring the following with you to your appointment:    (1) Any X-Rays, CTs or MRIs which have been performed.  Contact the facility where they were done to arrange for  prior to your scheduled appointment.    (2) List of current medications  (3) This referral request   (4) Any documents/labs given to you for this referral                  Future tests that were ordered for you today     Open Future Orders        Priority Expected Expires Ordered    Exercise Stress Echocardiogram Routine  9/20/2018 9/20/2017            Who to contact     If you have questions or need follow up information about  today's clinic visit or your schedule please contact [facility] directly at [phone].  Normal or non-critical lab and imaging results will be communicated to you by MyChart, letter or phone within 4 business days after the clinic has received the results. If you do not hear from us within 7 days, please contact the clinic through Impact Drivenhart or phone. If you have a critical or abnormal lab result, we will notify you by phone as soon as possible.  Submit refill requests through Xecced or call your pharmacy and they will forward the refill request to us. Please allow 3 business days for your refill to be completed.          Additional Information About Your Visit        Impact DrivenharBioDetego Information     Xecced gives you secure access to your electronic health record. If you see a primary care provider, you can also send messages to your care team and make appointments. If you have questions, please call your primary care clinic.  If you do not have a primary care provider, please call [phone] and they will assist you.        Care EveryWhere ID     This is your Care EveryWhere ID. This could be used by other organizations to access your [place] medical records  [id number]        Your Vitals Were     Pulse Temperature Respirations Pulse Oximetry Breastfeeding? BMI (Body Mass Index)    93 97.4  F (36.3  C) (Oral) 16 93% No 26.31 kg/m2       Blood Pressure from Last 3 Encounters:   09/20/17 110/70   06/28/17 133/87   05/05/17 102/69    Weight from Last 3 Encounters:   09/20/17 168 lb (76.2 kg)   06/28/17 174 lb 9.6 oz (79.2 kg)   03/30/17 176 lb 9.6 oz (80.1 kg)              We Performed the Following     Basic metabolic panel     CBC with platelets differential     EKG 12-lead complete w/read - Clinics     NEUROLOGY ADULT REFERRAL     TSH with free T4 reflex          Today's Medication Changes          These changes are accurate as of: 9/20/17 12:41 PM.  If you have any questions, ask your nurse or doctor.                Start taking these medicines.        Dose/Directions    propranolol 10 MG tablet   Commonly known as:  INDERAL   Used for:  Tremor   Started by:  Mary Sifuentes MD        Dose:  10 mg   Take 1 tablet (10 mg) by mouth 2 times daily   Quantity:  60 tablet   Refills:  1            Where to get your medicines      These medications were sent to [facility] [id number]  [place], [place] - [address]  [address] [facility], [place] [facility] [address]     Phone:  [phone]     propranolol 10 MG tablet                Primary Care Provider Office Phone # Fax #    Javan Erasmo Canela [phone] [address] [facility] [address]        Equal Access to Services     BERNY HOGAN : Hadii aad ku hadasho Sonancy, waaxda luqadaha, qaybta kaalmada adeegyada, vasiliy wells. So [facility] [phone].    ATENCIÓN: Si habla español, tiene a aden disposición servicios gratuitos de asistencia lingüística. Llame al [phone].    We comply with applicable federal civil rights laws and [place] laws. We do not discriminate on the basis of race, color, national origin, age, disability sex, sexual orientation or gender identity.            Thank you!     Thank you for choosing [facility]  for your care. Our goal is always to provide you with excellent care. Hearing back from our patients is one way we can continue to improve our services. Please take a few minutes to complete the written survey that you may receive in the mail after your visit with us. Thank you!             Your Updated Medication List - Protect others around you: Learn how to safely use, store and throw away your medicines at www.disposemymeds.org.          This list is accurate as of: 9/20/17 12:41 PM.  Always use your most recent med list.                   Brand Name Dispense Instructions for use Diagnosis    albuterol 108 (90 BASE) MCG/ACT Inhaler    PROAIR HFA/PROVENTIL  HFA/VENTOLIN HFA    1 Inhaler    Inhale 2 puffs into the lungs every 4 hours as needed for shortness of breath / dyspnea    Moderate persistent asthma, uncomplicated       citalopram 20 MG tablet    celeXA    90 tablet    Take 1 tablet (20 mg) by mouth daily    Anxiety       lisinopril-hydrochlorothiazide 10-12.5 MG per tablet    PRINZIDE/ZESTORETIC    90 tablet    Take 1 tablet by mouth daily    Essential hypertension with goal blood pressure less than 140/90       minocycline 100 MG capsule    MINOCIN/DYNACIN    60 capsule    Take 1 capsule (100 mg) by mouth 2 times daily    Dermatitis, perioral       mometasone-formoterol 200-5 MCG/ACT oral inhaler    DULERA    39 g    Inhale 2 puffs into the lungs 2 times daily    Moderate persistent asthma, uncomplicated       omeprazole 20 MG CR capsule    priLOSEC    30 capsule    Take 1 capsule (20 mg) by mouth daily    Abdominal pain, epigastric       propranolol 10 MG tablet    INDERAL    60 tablet    Take 1 tablet (10 mg) by mouth 2 times daily    Tremor       traZODone 100 MG tablet    DESYREL    180 tablet    TAKE TWO TABLETS BY MOUTH AT BEDTIME    Persistent insomnia

## 2017-09-20 NOTE — PROGRESS NOTES
"  SUBJECTIVE:   Ludy Ramos is a 61 year old female who presents to clinic today for the following health issues:      Patient presents with:  Tremors: pt c/o feeling \"Shaky\" x 1 month, conncerned about possible Parkinson's    Couple of months ago started notice tremors or shakiness with intentional and also at rest  Patient drinks 3 cocktails a night. Been drinking that way for the past 2 years.  Also some head bobbing.  When she drinks it gets better.  Has some anxiety.  No thoughts of harming self or others     Also having some exertional shortness of breath. For years now  No chest pain no cough  No orthopnea no pnd no edema.  Has tried using dulera no relief. Was told to follow up with primary care provider.  Patient worried about her heart.    Also complaining of some right ear/jaw pain when her jaw spasms and tenses up.  She does have some anxiety. No thoughts of harming self or others   No hearing loss no tinnitus       No Known Allergies    Past Medical History:   Diagnosis Date     Generalized anxiety disorder      Hypertension goal BP (blood pressure) < 140/90      Insomnia      Intermittent asthma      Menopausal state      Osteopenia          Current Outpatient Prescriptions on File Prior to Visit:  citalopram (CELEXA) 20 MG tablet Take 1 tablet (20 mg) by mouth daily   albuterol (PROAIR HFA/PROVENTIL HFA/VENTOLIN HFA) 108 (90 BASE) MCG/ACT Inhaler Inhale 2 puffs into the lungs every 4 hours as needed for shortness of breath / dyspnea   mometasone-formoterol (DULERA) 200-5 MCG/ACT oral inhaler Inhale 2 puffs into the lungs 2 times daily   traZODone (DESYREL) 100 MG tablet TAKE TWO TABLETS BY MOUTH AT BEDTIME   lisinopril-hydrochlorothiazide (PRINZIDE/ZESTORETIC) 10-12.5 MG per tablet Take 1 tablet by mouth daily   omeprazole (PRILOSEC) 20 MG CR capsule Take 1 capsule (20 mg) by mouth daily   minocycline (MINOCIN/DYNACIN) 100 MG capsule Take 1 capsule (100 mg) by mouth 2 times daily     No current " facility-administered medications on file prior to visit.     Social History   Substance Use Topics     Smoking status: Former Smoker     Quit date: 12/8/2001     Smokeless tobacco: Never Used     Alcohol use 6.0 oz/week       ROS:  10 point review of systems negative except for noted above.   No thoughts of harming self or others.     OBJECTIVE:  /70  Pulse 93  Temp 97.4  F (36.3  C) (Oral)  Resp 16  Wt 168 lb (76.2 kg)  SpO2 93%  Breastfeeding? No  BMI 26.31 kg/m2   General:   awake, alert, and cooperative.  NAD.   Head: Normocephalic, atraumatic.  Eyes: Conjunctiva clear,   ENT: midline nasal septum no congestion. Bilateral TYMPANINC MEMBRANE normal   Mouth: moist buccal mucosa nonhyperemic posterior pharyngeal wall tonsils not enlarged  Neck: supple no cervical lymphadenopathy  VERY TENDER EXQUISITE RIGHT TMJ   Heart: Regular rate and rhythm. No murmur.  Lungs: Chest is clear; no wheezes or rales.   Abdomen: soft non-tender.  Neuro: Alert and oriented - normal speech.  Cranial nerves were intact. MMT 5/5 bilateral upper and lower extremities. Sensory was intact. No gross neurologic deficits. Normal gait and cerebellar function. No meningeal signs  Some intentional tremor noted brief and some resting tremor brief, some mild tremor noted on the head when patient got increasingly anxieous. No constant pill rolling tremor.   MS: Using extremities freely  PSYCH:  Normal affect, normal speech  SKIN: no obvious rashes    EKG I reviewed this myself official cardiology report to follow no acute ST-Twave changes.     Diagnostic Test Results:  Results for orders placed or performed in visit on 09/20/17 (from the past 24 hour(s))   CBC with platelets differential   Result Value Ref Range    WBC 6.5 4.0 - 11.0 10e9/L    RBC Count 4.13 3.8 - 5.2 10e12/L    Hemoglobin 15.0 11.7 - 15.7 g/dL    Hematocrit 43.1 35.0 - 47.0 %     (H) 78 - 100 fl    MCH 36.3 (H) 26.5 - 33.0 pg    MCHC 34.8 31.5 - 36.5 g/dL     RDW 12.1 10.0 - 15.0 %    Platelet Count 239 150 - 450 10e9/L    Diff Method Automated Method     % Neutrophils 55.5 %    % Lymphocytes 30.6 %    % Monocytes 11.7 %    % Eosinophils 1.9 %    % Basophils 0.3 %    Absolute Neutrophil 3.6 1.6 - 8.3 10e9/L    Absolute Lymphocytes 2.0 0.8 - 5.3 10e9/L    Absolute Monocytes 0.8 0.0 - 1.3 10e9/L    Absolute Eosinophils 0.1 0.0 - 0.7 10e9/L    Absolute Basophils 0.0 0.0 - 0.2 10e9/L         ASSESSMENT:    ICD-10-CM    1. Tremor R25.1 propranolol (INDERAL) 10 MG tablet     NEUROLOGY ADULT REFERRAL     CBC with platelets differential     Basic metabolic panel     TSH with free T4 reflex   2. TMJ (temporomandibular joint syndrome) M26.609    3. SOB (shortness of breath) R06.02 EKG 12-lead complete w/read - Clinics     Exercise Stress Echocardiogram   4. Macrocytosis without anemia D75.89 Vitamin B12     Folate       PLAN:   Tremor historically and on exam appears consistent with essential tremor possibly exacerbated by anxiety.  However recommend neurology follow up.   Patient would like a trial of propranolol. Side effects discussed  Follow up with primary care provider recommended in 2-4 weeks after this visit for follow up of all above  Ear pain - consistent with tmj. Recommend follow up with dentist  Shortness of breath with exertion. Will order stress test and follow up with primary care provider  If with any symptoms of chest pain or shortness of breath, lightheadedness, palpitations, feeling like passing out or change and worsening in the quality of your symptoms, please proceed to ER. Recommend follow up with PCP in a few days for re-evaluation.   On labs macrocytosis noted. Will check vitamin B12 and folate and follow up with primary care provider   Patient seen on same day basis. Needs follow up primary care provider stressed with patient.       Advised about symptoms which might herald more serious problems.        Mary Sifuentes MD

## 2017-09-21 ENCOUNTER — TELEPHONE (OUTPATIENT)
Dept: FAMILY MEDICINE | Facility: CLINIC | Age: 62
End: 2017-09-21

## 2017-09-21 NOTE — TELEPHONE ENCOUNTER
Called patient tonight, concern that anion gap is elevated.  I am concerned that she might be acutely ill because of this elevated acidity in blood  Recommend being seen in ER however patient adamantly declines.  Aware of the risks. Patient adamantly refusing.  She plans to follow up tomorrow in clinic. Will likely need STAT labs and further evaluation which is why I recommended ER.  She then admits that she has been drinking a lot of alcohol more lately. Recommend checking LFT's.  No thoughts of harming self or others   Mary Sifuentes M.D.

## 2017-09-21 NOTE — TELEPHONE ENCOUNTER
Called patient to follow up as she did not go to the ER and did not schedule an appointment today. Concern about elevated anion gap.  Interested reader referred to note in epic from yesterday.  Patient states she feels fine. Her tremors are not bothering her.  Concern about her alcohol use and tremors possibly sign of withdrawal, also concern for metabolic acidosis with her alcohol use.  Once again recommended ER or ASAP appointment with any provider today. I have openings, patient declined.  Discussed folic acid is low . She states she declines supplementation until she talks with Dr. Canela.  No thoughts of harming self or others   Aware the risks of alcohol withdrawal and DO NOT ADVISE QUITTING COLD TURKEY  Signs or symptoms of alcohol withdrawal discussed and discussed there are ways we can do this safely.  She states she will discuss with her primary care provider.  Mary Sifuentes M.D.

## 2017-09-25 ENCOUNTER — RADIANT APPOINTMENT (OUTPATIENT)
Dept: GENERAL RADIOLOGY | Facility: CLINIC | Age: 62
End: 2017-09-25
Attending: FAMILY MEDICINE
Payer: COMMERCIAL

## 2017-09-25 ENCOUNTER — OFFICE VISIT (OUTPATIENT)
Dept: FAMILY MEDICINE | Facility: CLINIC | Age: 62
End: 2017-09-25
Payer: COMMERCIAL

## 2017-09-25 ENCOUNTER — TELEPHONE (OUTPATIENT)
Dept: FAMILY MEDICINE | Facility: CLINIC | Age: 62
End: 2017-09-25

## 2017-09-25 VITALS
DIASTOLIC BLOOD PRESSURE: 80 MMHG | HEART RATE: 101 BPM | OXYGEN SATURATION: 96 % | TEMPERATURE: 97.4 F | WEIGHT: 167.8 LBS | BODY MASS INDEX: 26.28 KG/M2 | SYSTOLIC BLOOD PRESSURE: 130 MMHG

## 2017-09-25 DIAGNOSIS — J45.50 UNCONTROLLED SEVERE PERSISTENT ASTHMA (H): ICD-10-CM

## 2017-09-25 DIAGNOSIS — Z13.220 LIPID SCREENING: ICD-10-CM

## 2017-09-25 DIAGNOSIS — R06.02 SOB (SHORTNESS OF BREATH): ICD-10-CM

## 2017-09-25 DIAGNOSIS — Z87.891 EX-SMOKER FOR MORE THAN 1 YEAR: ICD-10-CM

## 2017-09-25 DIAGNOSIS — Z11.59 ENCOUNTER FOR HEPATITIS C SCREENING TEST FOR LOW RISK PATIENT: Primary | ICD-10-CM

## 2017-09-25 LAB
ANION GAP SERPL CALCULATED.3IONS-SCNC: 10 MMOL/L (ref 3–14)
BUN SERPL-MCNC: 11 MG/DL (ref 7–30)
CALCIUM SERPL-MCNC: 8.7 MG/DL (ref 8.5–10.1)
CHLORIDE SERPL-SCNC: 97 MMOL/L (ref 94–109)
CHOLEST SERPL-MCNC: 225 MG/DL
CO2 SERPL-SCNC: 28 MMOL/L (ref 20–32)
CREAT SERPL-MCNC: 0.76 MG/DL (ref 0.52–1.04)
FEF 25/75: NORMAL
FEV-1: NORMAL
FEV1/FVC: NORMAL
FVC: NORMAL
GFR SERPL CREATININE-BSD FRML MDRD: 77 ML/MIN/1.7M2
GLUCOSE SERPL-MCNC: 107 MG/DL (ref 70–99)
HDLC SERPL-MCNC: 141 MG/DL
LDLC SERPL CALC-MCNC: 69 MG/DL
NONHDLC SERPL-MCNC: 84 MG/DL
POTASSIUM SERPL-SCNC: 3.7 MMOL/L (ref 3.4–5.3)
SODIUM SERPL-SCNC: 135 MMOL/L (ref 133–144)
TRIGL SERPL-MCNC: 76 MG/DL

## 2017-09-25 PROCEDURE — 80048 BASIC METABOLIC PNL TOTAL CA: CPT | Performed by: FAMILY MEDICINE

## 2017-09-25 PROCEDURE — 94010 BREATHING CAPACITY TEST: CPT | Performed by: FAMILY MEDICINE

## 2017-09-25 PROCEDURE — 86803 HEPATITIS C AB TEST: CPT | Performed by: FAMILY MEDICINE

## 2017-09-25 PROCEDURE — 36415 COLL VENOUS BLD VENIPUNCTURE: CPT | Performed by: FAMILY MEDICINE

## 2017-09-25 PROCEDURE — 71020 XR CHEST 2 VW: CPT

## 2017-09-25 PROCEDURE — 80061 LIPID PANEL: CPT | Performed by: FAMILY MEDICINE

## 2017-09-25 PROCEDURE — 99214 OFFICE O/P EST MOD 30 MIN: CPT | Mod: 25 | Performed by: FAMILY MEDICINE

## 2017-09-25 NOTE — TELEPHONE ENCOUNTER
----- Message from Emily Cesar sent at 9/25/2017 12:40 PM CDT -----  Regarding: Physician Review needed for Insurance  Contact: [phone]  Please call [facility] to complete the Physician Review needed in order to approve the CT Chest Lung Cancer Screening.    [facility] # [phone]  Ref# [id number]    Patient is currently scheduled at [facility] on Friday Sept. 29th.    Thank you  Rachel

## 2017-09-25 NOTE — LETTER
[facility]  [address] [facility] [id number] [phone]          September 25, 2017    RE:  Ludy Ramos                                                                                                                                                       [address] [facility] [id number]            To whom it may concern:    Ludy Ramos is under my professional care for    Encounter for hepatitis C screening test for low risk patient  Ex-smoker for more than 1 year  SOB (shortness of breath)  Lipid screening  Uncontrolled severe persistent asthma She  may return to work with the following: The employee is ABLE to return to work today.      Sincerely,        Javan Canela MD

## 2017-09-25 NOTE — NURSING NOTE
"Chief Complaint   Patient presents with     RECHECK     Follow up from visit on 9/20/17, feeling shaky        Initial BP (!) 149/92  Pulse 101  Temp 97.4  F (36.3  C) (Oral)  Wt 167 lb 12.8 oz (76.1 kg)  SpO2 93%  BMI 26.28 kg/m2 Estimated body mass index is 26.28 kg/(m^2) as calculated from the following:    Height as of 6/28/17: 5' 7\" (1.702 m).    Weight as of this encounter: 167 lb 12.8 oz (76.1 kg).  Medication Reconciliation: complete  Ezra Downs CMA    "

## 2017-09-25 NOTE — MR AVS SNAPSHOT
After Visit Summary   9/25/2017    Ludy Ramos    MRN: [id number]           Patient Information     Date Of Birth          1955        Visit Information        Provider Department      9/25/2017 8:30 AM Javan Canela MD [facility]        Today's Diagnoses     Encounter for hepatitis C screening test for low risk patient    -  1    Ex-smoker for more than 1 year        SOB (shortness of breath)        Lipid screening        Uncontrolled severe persistent asthma           Follow-ups after your visit        Additional Services     ALLERGY/ASTHMA ADULT REFERRAL       Your provider has referred you to: [facility]: [facility]  [phone] [email or web address]    Please be aware that coverage of these services is subject to the terms and limitations of your health insurance plan.  Call member services at your health plan with any benefit or coverage questions.      Please bring the following with you to your appointment:    (1) Any X-Rays, CTs or MRIs which have been performed.  Contact the facility where they were done to arrange for  prior to your scheduled appointment.    (2) List of current medications  (3) This referral request   (4) Any documents/labs given to you for this referral                  Future tests that were ordered for you today     Open Future Orders        Priority Expected Expires Ordered    CT Chest Lung Cancer Scrn Low Dose wo Routine  9/26/2018 9/25/2017            Who to contact     If you have questions or need follow up information about today's clinic visit or your schedule please contact [facility] directly at [phone].  Normal or non-critical lab and imaging results will be communicated to you by MyChart, letter or phone within 4 business days after the clinic has received the results. If you do not hear from us within 7 days, please contact the clinic through MyChart or phone. If you  have a critical or abnormal lab result, we will notify you by phone as soon as possible.  Submit refill requests through [employer] or call your pharmacy and they will forward the refill request to us. Please allow 3 business days for your refill to be completed.          Additional Information About Your Visit        The [employer] Information     [employer] gives you secure access to your electronic health record. If you see a primary care provider, you can also send messages to your care team and make appointments. If you have questions, please call your primary care clinic.  If you do not have a primary care provider, please call [phone] and they will assist you.        Care EveryWhere ID     This is your Care EveryWhere ID. This could be used by other organizations to access your [facility] medical records  [id number]        Your Vitals Were     Pulse Temperature Pulse Oximetry BMI (Body Mass Index)          101 97.4  F (36.3  C) (Oral) 96% 26.28 kg/m2         Blood Pressure from Last 3 Encounters:   09/25/17 130/80   09/20/17 110/70   06/28/17 133/87    Weight from Last 3 Encounters:   09/25/17 167 lb 12.8 oz (76.1 kg)   09/20/17 168 lb (76.2 kg)   06/28/17 174 lb 9.6 oz (79.2 kg)              We Performed the Following     ALLERGY/ASTHMA ADULT REFERRAL     Basic metabolic panel  (Ca, Cl, CO2, Creat, Gluc, K, Na, BUN)     Hepatitis C Screen Reflex to HCV RNA Quant and Genotype     Lipid panel reflex to direct LDL     Spirometry, Breathing Capacity          Today's Medication Changes          These changes are accurate as of: 9/25/17  9:37 AM.  If you have any questions, ask your nurse or doctor.               These medicines have changed or have updated prescriptions.        Dose/Directions    mometasone-formoterol 200-5 MCG/ACT oral inhaler   Commonly known as:  DULERA   This may have changed:  Another medication with the same name was removed. Continue taking this medication, and follow the directions you see here.    Used for:  Moderate persistent asthma, uncomplicated   Changed by:  Javan Canela MD        Dose:  2 puff   Inhale 2 puffs into the lungs 2 times daily   Quantity:  39 g   Refills:  1         Stop taking these medicines if you haven't already. Please contact your care team if you have questions.     propranolol 10 MG tablet   Commonly known as:  INDERAL   Stopped by:  Javan Canela MD           traZODone 100 MG tablet   Commonly known as:  DESYREL   Stopped by:  Javan Canela MD                    Primary Care Provider Office Phone # Fax #    Javan Canela [phone] [address] [facility] [address]        Equal Access to Services     [facility]: Hadii aad ku hadasho Soomaali, waaxda luqadaha, qaybta kaalmada adeegyada, waxay idiin haykarenn riya warner . So [facility] [phone].    ATENCIÓN: Si habla español, tiene a aden disposición servicios gratuitos de asistencia lingüística. [facility] [phone].    We comply with applicable federal civil rights laws and [place] laws. We do not discriminate on the basis of race, color, national origin, age, disability sex, sexual orientation or gender identity.            Thank you!     Thank you for choosing [facility]  for your care. Our goal is always to provide you with excellent care. Hearing back from our patients is one way we can continue to improve our services. Please take a few minutes to complete the written survey that you may receive in the mail after your visit with us. Thank you!             Your Updated Medication List - Protect others around you: Learn how to safely use, store and throw away your medicines at www.disposemymeds.org.          This list is accurate as of: 9/25/17  9:37 AM.  Always use your most recent med list.                   Brand Name Dispense Instructions for use Diagnosis    albuterol 108 (90 BASE) MCG/ACT Inhaler    PROAIR HFA/PROVENTIL HFA/VENTOLIN  HFA    1 Inhaler    Inhale 2 puffs into the lungs every 4 hours as needed for shortness of breath / dyspnea    Moderate persistent asthma, uncomplicated       citalopram 20 MG tablet    celeXA    90 tablet    Take 1 tablet (20 mg) by mouth daily    Anxiety       lisinopril-hydrochlorothiazide 10-12.5 MG per tablet    PRINZIDE/ZESTORETIC    90 tablet    Take 1 tablet by mouth daily    Essential hypertension with goal blood pressure less than 140/90       minocycline 100 MG capsule    MINOCIN/DYNACIN    60 capsule    Take 1 capsule (100 mg) by mouth 2 times daily    Dermatitis, perioral       mometasone-formoterol 200-5 MCG/ACT oral inhaler    DULERA    39 g    Inhale 2 puffs into the lungs 2 times daily    Moderate persistent asthma, uncomplicated       omeprazole 20 MG CR capsule    priLOSEC    30 capsule    Take 1 capsule (20 mg) by mouth daily    Abdominal pain, epigastric

## 2017-09-25 NOTE — TELEPHONE ENCOUNTER
Call to [facility].  Spoke with Aileen MELO,  She states that the CT Chest Lung Cancer Screening was approved.  Date of approval 9/25/17 - 11/09/17.  Approval #[id number].  [facility] is up to agency to notify patient.  Forwarding to imaging dept at [place].  Laura Cespedes RN

## 2017-09-26 LAB — HCV AB SERPL QL IA: NONREACTIVE

## 2017-10-02 ENCOUNTER — OFFICE VISIT (OUTPATIENT)
Dept: ALLERGY | Facility: CLINIC | Age: 62
End: 2017-10-02
Payer: COMMERCIAL

## 2017-10-02 VITALS
HEIGHT: 66 IN | WEIGHT: 170.4 LBS | OXYGEN SATURATION: 91 % | DIASTOLIC BLOOD PRESSURE: 80 MMHG | BODY MASS INDEX: 27.38 KG/M2 | HEART RATE: 86 BPM | SYSTOLIC BLOOD PRESSURE: 124 MMHG

## 2017-10-02 DIAGNOSIS — T78.3XXA ANGIOEDEMA, INITIAL ENCOUNTER: ICD-10-CM

## 2017-10-02 DIAGNOSIS — J45.50 SEVERE PERSISTENT ASTHMA WITHOUT COMPLICATION (H): Primary | ICD-10-CM

## 2017-10-02 DIAGNOSIS — I10 ESSENTIAL HYPERTENSION: Primary | ICD-10-CM

## 2017-10-02 LAB
FEF 25/75: NORMAL
FEV-1: NORMAL
FEV1/FVC: NORMAL
FVC: NORMAL

## 2017-10-02 PROCEDURE — 99204 OFFICE O/P NEW MOD 45 MIN: CPT | Mod: 25 | Performed by: ALLERGY & IMMUNOLOGY

## 2017-10-02 PROCEDURE — 94060 EVALUATION OF WHEEZING: CPT | Performed by: ALLERGY & IMMUNOLOGY

## 2017-10-02 RX ORDER — LOSARTAN POTASSIUM AND HYDROCHLOROTHIAZIDE 12.5; 5 MG/1; MG/1
1 TABLET ORAL DAILY
Qty: 30 TABLET | Refills: 3 | Status: SHIPPED | OUTPATIENT
Start: 2017-10-02 | End: 2018-01-18

## 2017-10-02 NOTE — PATIENT INSTRUCTIONS
Allergy Staff Appt Hours Shot Hours Locations    Physician     Nakul Dubois DO       Support Staff     Sara ALVES RN      Neena ROMERO MA  Monday:                      [place] 8-7     Tuesday:         [place] 8-5 Wednesday:        [place]: 7-5     Friday:        [place] 7-5   [place]        Monday: 9-6        Friday: 7-2     [place]        Tuesday: 7-11 Thursday: 1:30-7     [place]        Tuesday: 1-7 Wednesday: 11-6 Thursday: 7-12 [facility]  [address] [place] [address]  Appt Line: [phone]  Allergy RN (Monday):  [phone]    [facility]  [address] [place] [address]  Appt Line: [phone]  Allergy RN (Tues & Wed):  [phone]    [facility]  [address] [place] [address]  Appt Line: [phone]  Allergy RN (Friday):  [phone]       Important Scheduling Information  Aspirin Desensitization: Appt will last 2 clinic days. Please call the Allergy RN line for your clinic to schedule. Discontinue antihistamines 7 days prior to the appointment.     Food Challenges: Appt will last 3-4 hours. Please call the Allergy RN line for your clinic to schedule. Discontinue antihistamines 7 days prior to the appointment.     Penicillin Testing: Appt will last 2-3 hours. Please call the Allergy RN line for your clinic to schedule. Discontinue antihistamines 7 days prior to the appointment.     Skin Testing: Appt will about 40 minutes. Call the appointment line for your clinic to schedule. Discontinue antihistamines 7 days prior to the appointment.     Venom Testing: Appt will last 2-3 hours. Please call the Allergy RN line for your clinic to schedule. Discontinue antihistamines 7 days prior to the appointment.     Thank you for trusting us with your Allergy, Asthma, and Immunology care. Please feel free to contact us with any questions or concerns you may have.      - Increase Dulera 200/5mcg to 2 puffs twice daily.   - Albuterol 2-4  puffs inhaled (use a spacer unless using a Proair Respiclick device) every 4 hours as needed for chest tightness, wheezing, shortness of breath and/or coughing.   - Albuterol 2-4 puffs inhaled (use spacer if not using Proair Respiclick device) 15-20 minutes prior to physical activity. Please ensure warm up period prior to exercise.   - Stay off lisinopril. You may have swelling episodes for up to 6 months. If episodes you need to go to ER.   - Return to clinic in 6 weeks.

## 2017-10-02 NOTE — Clinical Note
I saw your patient today in consultation. Please see the attached note for full details and recommendations from the consultation. I appreciate you trusting [facility] to provide care to your patients.  Nakul Dubois, DO [facility]

## 2017-10-02 NOTE — PROGRESS NOTES
Ludy Ramos is a 61 year old White female with previous medical history significant for GERD, hypertension, and moderate persistent asthma. Ludy Ramos is being seen today for evaluation of asthma and angioedema. The patient is being seen in consultation at the request of Dr. Hilton MD.     The patient reports that over the course of the last 10 years she has had perennial shortness of breath, wheezing, coughing and tightness in chest. She has daily shortness of breath with minimal exertion such as climbing stairs and lifting boxes. She denies symptoms with walking on a flat surface. She reports nocturnal wheezing. She reports coughing and tightness in her chest. She has been taking Dulera 200/5mcg 2 puffs inhaled daily. This has been somewhat beneficial. She is still using albuterol 1-2 times per day in the afternoon despite being on Dulera. She was not taking Dulera twice daily. She has an extensive history of smoking and smoked 2 packs per day for 30 years. She stopped smoking 17 years ago. She recently had a normal chest x-ray. She reports that she has been diagnosed with asthma and has never been diagnosed with COPD. She has a persistent cough productive of clear sputum. She coughs throughout the day. She denies congestion, rhinorrhea, postnasal drainage. She has not been seen by pulmonary.    The patient reports that over the last 2 years she has had intermittent episodes of throat tightness and sensation of swelling. She additionally has had facial swelling involving her lips and cheeks. Her last episode of throat tightness/swelling occurred one month ago. She had facial swelling 2 months ago. She has 4-5 episodes of angioedema per year. She is on lisinopril. She has been on the Cipro for multiple years. She has used epinephrine for angioedema episodes without improvement in symptoms. Each angioedema episode last 2-3 days and resolve on own. No family history of angioedema. No hives  associated.    The patient has no history of asthma, eczema, food allergies, medications allergies or hives.     ENVIRONMENTAL HISTORY: [place] The home is heated with a forced air. They does have central air conditioning. The patient's bedroom is furnished with feather/wool bedding or pillows, carpeting in bedroom, allergen mattress cover and allergen pillowcase cover.  Pets inside the house include 2 cat(s). There is not history of cockroach or mice infestation. There is/are 0 smokers in the house.  The house does not have a damp basement.     ACT Total Scores 10/2/2017   ACT TOTAL SCORE -   ASTHMA ER VISITS -   ASTHMA HOSPITALIZATIONS -   ACT TOTAL SCORE (Goal Greater than or Equal to 20) 17   In the past 12 months, how many times did you visit the emergency room for your asthma without being admitted to the hospital? 0   In the past 12 months, how many times were you hospitalized overnight because of your asthma? 0       Past Medical History:   Diagnosis Date     Generalized anxiety disorder      Hypertension goal BP (blood pressure) < 140/90      Insomnia      Intermittent asthma      Menopausal state      Osteopenia      Family History   Problem Relation Age of Onset     Respiratory Mother      copd     Respiratory Father      empysema     Melanoma No family hx of      Past Surgical History:   Procedure Laterality Date     COLONOSCOPY       COLONOSCOPY  5/20/2014    Procedure: COLONOSCOPY;  Surgeon: Jean Hastings MD;  Location: [facility] OR     COLONOSCOPY  5/20/2014    Procedure: COMBINED COLONOSCOPY, SINGLE BIOPSY/POLYPECTOMY BY BIOPSY;  Surgeon: Jean Hastings MD;  Location: [facility] OR     COLONOSCOPY N/A 5/5/2017    Procedure: COMBINED COLONOSCOPY, SINGLE OR MULTIPLE BIOPSY/POLYPECTOMY BY BIOPSY;;  Surgeon: Duane, William Charles, MD;  Location: [facility] OR     COLONOSCOPY WITH CO2 INSUFFLATION N/A 5/5/2017    Procedure: COLONOSCOPY WITH CO2 INSUFFLATION;   colonoscopy, History of colonic polyps  BMI 25.74  [facility]avin [facility], fax: [phone];  Surgeon: Duane, William Charles, MD;  Location: [facility] OR     FINGER SURGERY      Left 4th digit     HYSTERECTOMY, JONAS       SURGICAL HISTORY OF -       Right 2nd digit     TONSILLECTOMY         REVIEW OF SYSTEMS:  General: negative for weight gain. negative for weight loss. negative for changes in sleep.   Ears: negative for fullness. negative for hearing loss. negative for dizziness.   Nose: positive  for snoring.negative for changes in smell. negative for drainage.   Eyes: negative for eye watering. negative for eye itching. negative for vision changes. negative for eye redness.  Throat: negative for hoarseness. positive  for sore throat. negative for trouble swallowing.   Lungs: positive  for shortness of breath.positive  for wheezing. positive  for sputum production.   Cardiovascular: negative for chest pain. negative for swelling of ankles. negative for fast or irregular heartbeat.   Gastrointestinal: negative for nausea. negative for heartburn. negative for acid reflux.   Musculoskeletal: positive  for joint pain. positive  for joint stiffness. negative for joint swelling.   Neurologic: negative for seizures. negative for fainting. negative for weakness.   Psychiatric: negative for changes in mood. negative for anxiety.   Endocrine: negative for cold intolerance. negative for heat intolerance. positive  for tremors.   Lymphatic: negative for lower extremity swelling. negative for lymph node swelling.   Hematologic: positive  for easy bruising. negative for easy bleeding.  Integumentary: negative for rash. negative for scaling. negative for nail changes.       Current Outpatient Prescriptions:      citalopram (CELEXA) 20 MG tablet, Take 1 tablet (20 mg) by mouth daily, Disp: 90 tablet, Rfl: 1     albuterol (PROAIR HFA/PROVENTIL HFA/VENTOLIN HFA) 108 (90 BASE) MCG/ACT Inhaler, Inhale 2 puffs into the lungs every 4  hours as needed for shortness of breath / dyspnea, Disp: 1 Inhaler, Rfl: 3     mometasone-formoterol (DULERA) 200-5 MCG/ACT oral inhaler, Inhale 2 puffs into the lungs 2 times daily, Disp: 39 g, Rfl: 1     omeprazole (PRILOSEC) 20 MG CR capsule, Take 1 capsule (20 mg) by mouth daily, Disp: 30 capsule, Rfl: 1     minocycline (MINOCIN/DYNACIN) 100 MG capsule, Take 1 capsule (100 mg) by mouth 2 times daily, Disp: 60 capsule, Rfl: 2     losartan-hydrochlorothiazide (HYZAAR) 50-12.5 MG per tablet, Take 1 tablet by mouth daily, Disp: 30 tablet, Rfl: 3  Immunization History   Administered Date(s) Administered     HEPA 10/30/2006, 05/24/2007     Influenza (IIV3) 01/19/2011, 12/04/2012     Influenza Vaccine, 3 YRS +, IM (QUADRIVALENT W/PRESERVATIVES) 12/23/2014     [place] 06/26/2013     TD (ADULT, 7+) 08/12/2005     TDAP Vaccine (Adacel) 09/10/2015     No Known Allergies      EXAM:   Constitutional:  Appears well-developed and well-nourished. No distress.   HEENT:   Head: Normocephalic.   Right Ear: External ear normal. TM normal  Left Ear: External ear normal. TM normal  Mouth/Throat: No oropharyngeal exudate present.   No cobblestoning of posterior oropharynx.   Nasal tissue pink and normal appearing.  No rhinorrhea noted.    Eyes: Conjunctivae are non-erythematous   No maxillary or frontal sinus tenderness to palpation.   Cardiovascular: Normal rate, regular rhythm and normal heart sounds. Exam reveals no gallop and no friction rub.   No murmur heard.  Respiratory: Effort normal and breath sounds normal. No respiratory distress. No wheezes. No rales.   Musculoskeletal: Normal range of motion.   Lymphadenopathy:   No cervical adenopathy.   No lower extremity edema.   Neuro: Oriented to person, place, and time.  Skin: Skin is warm and dry. No rash noted.   Psychiatric: Normal mood and affect.     Nursing note and vitals reviewed.      WORKUP:   Spirometry-Pre  FVC % pred:53  FEV1 % pred:38  FEV1/FVC % act:56  FEF 25-75%  pred:20    Severe obstruction    Spirometry-Post  FVC % pred:62  FEV1 % pred:50  FEV1/FVC % act:62  FEF 25-75% pred:32    Significant improvement in FEV1 and FEF 25-75.      ASSESSMENT/PLAN:  Problem List Items Addressed This Visit        Respiratory    Severe persistent asthma without complication - Primary     Daily shortness of breath, wheezing, coughing and chest tightness. Currently on Dulera 200/5mcg 2 puffs inhaled daily. Ventolin use 1-2 times per day and beneficial. Extensive smoking history. Chest x-ray without emphysema and was otherwise normal.    Spirometry with severe obstruction that improved significantly post bronchodilator.    - An asthma action plan was provided and discussed with patient and family.   - Albuterol 2-4 puffs inhaled (use a spacer unless using a Proair Respiclick device) every 4 hours as needed for chest tightness, wheezing, shortness of breath and/or coughing.   - Albuterol 2-4 puffs inhaled (use spacer if not using Proair Respiclick device) 15-20 minutes prior to physical activity.   - Please ensure warm up period prior to exercise.   - Avoid asthma triggers.  - Increase Dulera 200/5mcg to 2 puffs inhaled bid. Use with a spacer device.  - How to use a spacer was demonstrated and inhaler technique reviewed.         Relevant Orders    Spirometry, Breathing Capacity (Completed)    BRONCHODILATION RESPONSE, PRE/POST ADMIN (Completed)       Other    Angioedema, initial encounter     Angioedema involving throat, lips, tongue and cheeks. Nonresponsive to epinephrine and antihistamines. No familial history. She is on lisinopril. Angioedema last 2-3 days. She has been hospitalized for angioedema in the intensive care unit.    - Stop lisinopril. Discussed with primary care. She was switched to other antihypertensive.  - Discussed with the patient that if angioedema recurs likely would not respond to antihistamines, steroid or epinephrine and that she should be seen and evaluated in the  emergency department. Discussed that angioedema could occur up to 6 months after discontinuing lisinopril.             Return to clinic in 6 weeks.     Chart documentation with Dragon Voice recognition Software. Although reviewed after completion, some words and grammatical errors may remain.    Nakul Dubois DO   Allergy/Immunology  [facility] [place]

## 2017-10-02 NOTE — ASSESSMENT & PLAN NOTE
Angioedema involving throat, lips, tongue and cheeks. Nonresponsive to epinephrine and antihistamines. No familial history. She is on lisinopril. Angioedema last 2-3 days. She has been hospitalized for angioedema in the intensive care unit.    - Stop lisinopril. Discussed with primary care. She was switched to other antihypertensive.  - Discussed with the patient that if angioedema recurs likely would not respond to antihistamines, steroid or epinephrine and that she should be seen and evaluated in the emergency department. Discussed that angioedema could occur up to 6 months after discontinuing lisinopril.

## 2017-10-02 NOTE — LETTER
My Asthma Action Plan  Name: Ludy Ramos   YOB: 1955  Date: 10/2/2017   My doctor: Nakul Dubois, DO   My clinic: [facility]        My Control Medicine: Mometasone + formoterol (Dulera) -  200/5 mcg 2 puffs twice daily.   My Rescue Medicine:   Albuterol 2-4 puffs inhaled (use a spacer unless using a Proair Respiclick device) every 4 hours as needed for chest tightness, wheezing, shortness of breath and/or coughing.      My Asthma Severity: severe persistent  Avoid your asthma triggers: Patient is unaware of triggers               GREEN ZONE   Good Control    I feel good    No cough or wheeze    Can work, sleep and play without asthma symptoms       Take your asthma control medicine every day.     1. If exercise triggers your asthma, take your rescue medication    15 minutes before exercise or sports, and    During exercise if you have asthma symptoms  2. Spacer to use with inhaler: If you have a spacer, make sure to use it with your inhaler             YELLOW ZONE Getting Worse  I have ANY of these:    I do not feel good    Cough or wheeze    Chest feels tight    Wake up at night   1. Keep taking your Green Zone medications  2. Start taking your rescue medicine:    every 20 minutes for up to 1 hour. Then every 4 hours for 24-48 hours.  3. If you stay in the Yellow Zone for more than 12-24 hours, contact your doctor.  4. If you do not return to the Green Zone in 12-24 hours or you get worse, start taking your oral steroid medicine if prescribed by your provider.           RED ZONE Medical Alert - Get Help  I have ANY of these:    I feel awful    Medicine is not helping    Breathing getting harder    Trouble walking or talking    Nose opens wide to breathe       1. Take your rescue medicine NOW  2. If your provider has prescribed an oral steroid medicine, start taking it NOW  3. Call your doctor NOW  4. If you are still in the Red Zone after 20 minutes and you have not reached  your doctor:    Take your rescue medicine again and    Call 911 or go to the emergency room right away    See your regular doctor within 2 weeks of an Emergency Room or Urgent Care visit for follow-up treatment.        Electronically signed by: Nakul Dubois, October 2, 2017    Annual Reminders:  Meet with Asthma Educator,  Flu Shot in the Fall, consider Pneumonia Vaccination for patients with asthma (aged 19 and older).    Pharmacy:    [id number] - [place], [place] - [address] [facility]  WRITTEN PRESCRIPTION REQUESTED  [facility] - [facility] [facility] - [place], [place] - [address] AT [facility]                    Asthma Triggers  How To Control Things That Make Your Asthma Worse    Triggers are things that make your asthma worse.  Look at the list below to help you find your triggers and what you can do about them.  You can help prevent asthma flare-ups by staying away from your triggers.      Trigger                                                          What you can do   Cigarette Smoke  Tobacco smoke can make asthma worse. Do not allow smoking in your home, car or around you.  Be sure no one smokes at a child s day care or school.  If you smoke, ask your health care provider for ways to help you quit.  Ask family members to quit too.  Ask your health care provider for a referral to Quit Plan to help you quit smoking, or call 3-344-889-PLAN.     Colds, Flu, Bronchitis  These are common triggers of asthma. Wash your hands often.  Don t touch your eyes, nose or mouth.  Get a flu shot every year.     Dust Mites  These are tiny bugs that live in cloth or carpet. They are too small to see. Wash sheets and blankets in hot water every week.   Encase pillows and mattress in dust mite proof covers.  Avoid having carpet if you can. If you have carpet, vacuum weekly.   Use a dust mask and HEPA vacuum.   Pollen and  Outdoor Mold  Some people are allergic to trees, grass, or weed pollen, or molds. Try to keep your windows closed.  Limit time out doors when pollen count is high.   Ask you health care provider about taking medicine during allergy season.     Animal Dander  Some people are allergic to skin flakes, urine or saliva from pets with fur or feathers. Keep pets with fur or feathers out of your home.    If you can t keep the pet outdoors, then keep the pet out of your bedroom.  Keep the bedroom door closed.  Keep pets off cloth furniture and away from stuffed toys.     Mice, Rats, and Cockroaches  Some people are allergic to the waste from these pests.   Cover food and garbage.  Clean up spills and food crumbs.  Store grease in the refrigerator.   Keep food out of the bedroom.   Indoor Mold  This can be a trigger if your home has high moisture. Fix leaking faucets, pipes, or other sources of water.   Clean moldy surfaces.  Dehumidify basement if it is damp and smelly.   Smoke, Strong Odors, and Sprays  These can reduce air quality. Stay away from strong odors and sprays, such as perfume, powder, hair spray, paints, smoke incense, paint, cleaning products, candles and new carpet.   Exercise or Sports  Some people with asthma have this trigger. Be active!  Ask your doctor about taking medicine before sports or exercise to prevent symptoms.    Warm up for 5-10 minutes before and after sports or exercise.     Other Triggers of Asthma  Cold air:  Cover your nose and mouth with a scarf.  Sometimes laughing or crying can be a trigger.  Some medicines and food can trigger asthma.

## 2017-10-02 NOTE — MR AVS SNAPSHOT
After Visit Summary   10/2/2017    Ludy Ramos    MRN: [id number]           Patient Information     Date Of Birth          1955        Visit Information        Provider Department      10/2/2017 4:20 PM Nakul Dubois DO [facility]        Today's Diagnoses     Moderate persistent asthma without complication    -  1      Care Instructions    Allergy Staff Appt Hours Shot Hours Locations    Physician     Nakul Dubois DO       Support Staff     JAIME العراقي MA  Monday:                      [place] 8-7     Tuesday:         [place] 8-5 Wednesday:        [place]: 7-5 Friday:        [place] 7-5   [place]        Monday: 9-6 Friday: 7-2     [place]        Tuesday: 7-11 Thursday: 1:30-7 [place]        Tuesday: 1-7 Wednesday: 11-6 Thursday: 7-12 [facility]  [address] [place] [address]  Appt Line: [phone]  Allergy RN (Monday):  [phone]    [facility]  [address] [place] [address]  Appt Line: [phone]  Allergy RN (Tues & Wed):  [phone]    [facility]  [address] [place] [address]  Appt Line: [phone]  Allergy RN (Friday):  [phone]       Important Scheduling Information  Aspirin Desensitization: Appt will last 2 clinic days. Please call the Allergy RN line for your clinic to schedule. Discontinue antihistamines 7 days prior to the appointment.     Food Challenges: Appt will last 3-4 hours. Please call the Allergy RN line for your clinic to schedule. Discontinue antihistamines 7 days prior to the appointment.     Penicillin Testing: Appt will last 2-3 hours. Please call the Allergy RN line for your clinic to schedule. Discontinue antihistamines 7 days prior to the appointment.     Skin Testing: Appt will about 40 minutes. Call the appointment line for your clinic to schedule. Discontinue antihistamines 7 days prior to the appointment.      Venom Testing: Appt will last 2-3 hours. Please call the Allergy RN line for your clinic to schedule. Discontinue antihistamines 7 days prior to the appointment.     Thank you for trusting us with your Allergy, Asthma, and Immunology care. Please feel free to contact us with any questions or concerns you may have.      - Increase Dulera 200/5mcg to 2 puffs twice daily.   - Albuterol 2-4 puffs inhaled (use a spacer unless using a Proair Respiclick device) every 4 hours as needed for chest tightness, wheezing, shortness of breath and/or coughing.   - Albuterol 2-4 puffs inhaled (use spacer if not using Proair Respiclick device) 15-20 minutes prior to physical activity. Please ensure warm up period prior to exercise.   - Stay off lisinopril. You may have swelling episodes for up to 6 months. If episodes you need to go to ER.   - Return to clinic in 6 weeks.               Follow-ups after your visit        Who to contact     If you have questions or need follow up information about today's clinic visit or your schedule please contact [facility] directly at [phone].  Normal or non-critical lab and imaging results will be communicated to you by DineroTaxihart, letter or phone within 4 business days after the clinic has received the results. If you do not hear from us within 7 days, please contact the clinic through Boatboundt or phone. If you have a critical or abnormal lab result, we will notify you by phone as soon as possible.  Submit refill requests through zintin or call your pharmacy and they will forward the refill request to us. Please allow 3 business days for your refill to be completed.          Additional Information About Your Visit        DineroTaxihart Information     zintin gives you secure access to your electronic health record. If you see a primary care provider, you can also send messages to your care team and make appointments. If you have questions, please call your primary care clinic.  If  "you do not have a primary care provider, please call [phone] and they will assist you.        Care EveryWhere ID     This is your Care EveryWhere ID. This could be used by other organizations to access your [facility] medical records  [id number]        Your Vitals Were     Pulse Height Pulse Oximetry BMI (Body Mass Index)          86 5' 5.75\" (1.67 m) 91% 27.71 kg/m2         Blood Pressure from Last 3 Encounters:   10/02/17 124/80   09/25/17 130/80   09/20/17 110/70    Weight from Last 3 Encounters:   10/02/17 170 lb 6.4 oz (77.3 kg)   09/25/17 167 lb 12.8 oz (76.1 kg)   09/20/17 168 lb (76.2 kg)              We Performed the Following     Spirometry, Breathing Capacity          Today's Medication Changes          These changes are accurate as of: 10/2/17  5:28 PM.  If you have any questions, ask your nurse or doctor.               Start taking these medicines.        Dose/Directions    losartan-hydrochlorothiazide 50-12.5 MG per tablet   Commonly known as:  HYZAAR   Used for:  Essential hypertension   Started by:  Javan Canela MD        Dose:  1 tablet   Take 1 tablet by mouth daily   Quantity:  30 tablet   Refills:  3         Stop taking these medicines if you haven't already. Please contact your care team if you have questions.     lisinopril-hydrochlorothiazide 10-12.5 MG per tablet   Commonly known as:  PRINZIDE/ZESTORETIC   Stopped by:  Javan Canela MD                Where to get your medicines      These medications were sent to [facility] [address] [facility]  [address] [facility] [address]     Phone:  [phone]     losartan-hydrochlorothiazide 50-12.5 MG per tablet                Primary Care Provider Office Phone # Fax #    Javan Canela [phone] [address] [address] [facility] [address]        Equal Access to Services     BERNY HOGAN AH: Hadii gabby ku hadasho Soomaali, waaxda luqadaha, qaybta kaalmada " vasiliy arriolacorby brothersaan ah. So [facility] [phone].    ATENCIÓN: Si habla fransisca, tiene a aden disposición servicios gratuitos de asistencia lingüística. [place] al [phone].    We comply with applicable federal civil rights laws and [place] laws. We do not discriminate on the basis of race, color, national origin, age, disability, sex, sexual orientation, or gender identity.            Thank you!     Thank you for choosing [facility]  for your care. Our goal is always to provide you with excellent care. Hearing back from our patients is one way we can continue to improve our services. Please take a few minutes to complete the written survey that you may receive in the mail after your visit with us. Thank you!             Your Updated Medication List - Protect others around you: Learn how to safely use, store and throw away your medicines at www.disposemymeds.org.          This list is accurate as of: 10/2/17  5:28 PM.  Always use your most recent med list.                   Brand Name Dispense Instructions for use Diagnosis    albuterol 108 (90 BASE) MCG/ACT Inhaler    PROAIR HFA/PROVENTIL HFA/VENTOLIN HFA    1 Inhaler    Inhale 2 puffs into the lungs every 4 hours as needed for shortness of breath / dyspnea    Moderate persistent asthma, uncomplicated       citalopram 20 MG tablet    celeXA    90 tablet    Take 1 tablet (20 mg) by mouth daily    Anxiety       losartan-hydrochlorothiazide 50-12.5 MG per tablet    HYZAAR    30 tablet    Take 1 tablet by mouth daily    Essential hypertension       minocycline 100 MG capsule    MINOCIN/DYNACIN    60 capsule    Take 1 capsule (100 mg) by mouth 2 times daily    Dermatitis, perioral       mometasone-formoterol 200-5 MCG/ACT oral inhaler    DULERA    39 g    Inhale 2 puffs into the lungs 2 times daily    Moderate persistent asthma, uncomplicated       omeprazole 20 MG CR capsule    priLOSEC    30 capsule    Take 1 capsule (20 mg)  by mouth daily    Abdominal pain, epigastric

## 2017-10-02 NOTE — NURSING NOTE
"Chief Complaint   Patient presents with     Consult     allergy and asthma       Initial /80 (BP Location: Right arm, Patient Position: Sitting, Cuff Size: Adult Large)  Pulse 86  Ht 5' 5.75\" (1.67 m)  Wt 170 lb 6.4 oz (77.3 kg)  SpO2 91%  BMI 27.71 kg/m2 Estimated body mass index is 27.71 kg/(m^2) as calculated from the following:    Height as of this encounter: 5' 5.75\" (1.67 m).    Weight as of this encounter: 170 lb 6.4 oz (77.3 kg).  Medication Reconciliation: complete   Neena Larose MA      "

## 2017-10-02 NOTE — ASSESSMENT & PLAN NOTE
Daily shortness of breath, wheezing, coughing and chest tightness. Currently on Dulera 200/5mcg 2 puffs inhaled daily. Ventolin use 1-2 times per day and beneficial. Extensive smoking history. Chest x-ray without emphysema and was otherwise normal.    Spirometry with severe obstruction that improved significantly post bronchodilator.    - An asthma action plan was provided and discussed with patient and family.   - Albuterol 2-4 puffs inhaled (use a spacer unless using a Proair Respiclick device) every 4 hours as needed for chest tightness, wheezing, shortness of breath and/or coughing.   - Albuterol 2-4 puffs inhaled (use spacer if not using Proair Respiclick device) 15-20 minutes prior to physical activity.   - Please ensure warm up period prior to exercise.   - Avoid asthma triggers.  - Increase Dulera 200/5mcg to 2 puffs inhaled bid. Use with a spacer device.  - How to use a spacer was demonstrated and inhaler technique reviewed.

## 2017-10-03 ASSESSMENT — ASTHMA QUESTIONNAIRES: ACT_TOTALSCORE: 17

## 2017-10-13 ENCOUNTER — TELEPHONE (OUTPATIENT)
Dept: FAMILY MEDICINE | Facility: CLINIC | Age: 62
End: 2017-10-13

## 2017-10-13 DIAGNOSIS — J45.40 MODERATE PERSISTENT ASTHMA, UNCOMPLICATED: ICD-10-CM

## 2017-10-13 NOTE — TELEPHONE ENCOUNTER
PA STARTED OVER THE PHONE WITH [facility]    Case# [id number]    Approximate turn around time? Can take up to 14 days to make a decision    Will receive response via fax [phone]

## 2017-10-13 NOTE — TELEPHONE ENCOUNTER
Pending Prescriptions:                       Disp   Refills    mometasone-formoterol (DULERA) 200-5 MCG/*39 g   0            Sig: Inhale 2 puffs into the lungs 2 times daily     Medication prescribed not covered by insurance. Please submit prior authorization by calling [phone]. Member number is [id number].    Kay Bright CMA

## 2017-10-16 ENCOUNTER — OFFICE VISIT (OUTPATIENT)
Dept: FAMILY MEDICINE | Facility: CLINIC | Age: 62
End: 2017-10-16
Payer: COMMERCIAL

## 2017-10-16 ENCOUNTER — TELEPHONE (OUTPATIENT)
Dept: FAMILY MEDICINE | Facility: CLINIC | Age: 62
End: 2017-10-16

## 2017-10-16 VITALS
DIASTOLIC BLOOD PRESSURE: 73 MMHG | OXYGEN SATURATION: 93 % | HEART RATE: 106 BPM | BODY MASS INDEX: 28.53 KG/M2 | WEIGHT: 175.4 LBS | SYSTOLIC BLOOD PRESSURE: 123 MMHG | TEMPERATURE: 97.2 F

## 2017-10-16 DIAGNOSIS — R82.90 NONSPECIFIC FINDING ON EXAMINATION OF URINE: ICD-10-CM

## 2017-10-16 DIAGNOSIS — R30.0 DYSURIA: Primary | ICD-10-CM

## 2017-10-16 LAB
ALBUMIN UR-MCNC: NEGATIVE MG/DL
APPEARANCE UR: ABNORMAL
BACTERIA #/AREA URNS HPF: ABNORMAL /HPF
BILIRUB UR QL STRIP: NEGATIVE
COLOR UR AUTO: YELLOW
GLUCOSE UR STRIP-MCNC: NEGATIVE MG/DL
HGB UR QL STRIP: ABNORMAL
KETONES UR STRIP-MCNC: NEGATIVE MG/DL
LEUKOCYTE ESTERASE UR QL STRIP: ABNORMAL
NITRATE UR QL: POSITIVE
NON-SQ EPI CELLS #/AREA URNS LPF: ABNORMAL /LPF
PH UR STRIP: 7 PH (ref 5–7)
RBC #/AREA URNS AUTO: ABNORMAL /HPF
SOURCE: ABNORMAL
SP GR UR STRIP: <=1.005 (ref 1–1.03)
UROBILINOGEN UR STRIP-ACNC: 0.2 EU/DL (ref 0.2–1)
WBC #/AREA URNS AUTO: ABNORMAL /HPF

## 2017-10-16 PROCEDURE — 99213 OFFICE O/P EST LOW 20 MIN: CPT | Performed by: FAMILY MEDICINE

## 2017-10-16 PROCEDURE — 81001 URINALYSIS AUTO W/SCOPE: CPT | Performed by: FAMILY MEDICINE

## 2017-10-16 PROCEDURE — 87186 SC STD MICRODIL/AGAR DIL: CPT | Performed by: FAMILY MEDICINE

## 2017-10-16 PROCEDURE — 87086 URINE CULTURE/COLONY COUNT: CPT | Performed by: FAMILY MEDICINE

## 2017-10-16 PROCEDURE — 87088 URINE BACTERIA CULTURE: CPT | Performed by: FAMILY MEDICINE

## 2017-10-16 RX ORDER — SULFAMETHOXAZOLE AND TRIMETHOPRIM 400; 80 MG/1; MG/1
1 TABLET ORAL 2 TIMES DAILY
Qty: 14 TABLET | Refills: 0 | Status: SHIPPED | OUTPATIENT
Start: 2017-10-16 | End: 2018-02-09

## 2017-10-16 NOTE — MR AVS SNAPSHOT
After Visit Summary   10/16/2017    Ludy Ramos    MRN: [id number]           Patient Information     Date Of Birth          1955        Visit Information        Provider Department      10/16/2017 11:30 AM Javan Canela MD [facility]        Today's Diagnoses     Dysuria    -  1    Nonspecific finding on examination of urine           Follow-ups after your visit        Your next 10 appointments already scheduled     Nov 13, 2017 10:00 AM CST   Return Visit with Nakul Dubois,    [facility] ([facility])    [address] [facility] [address] [phone]              Who to contact     If you have questions or need follow up information about today's clinic visit or your schedule please contact [facility] directly at [phone].  Normal or non-critical lab and imaging results will be communicated to you by MyChart, letter or phone within 4 business days after the clinic has received the results. If you do not hear from us within 7 days, please contact the clinic through Proximexhart or phone. If you have a critical or abnormal lab result, we will notify you by phone as soon as possible.  Submit refill requests through Vimagino or call your pharmacy and they will forward the refill request to us. Please allow 3 business days for your refill to be completed.          Additional Information About Your Visit        MyChart Information     Vimagino gives you secure access to your electronic health record. If you see a primary care provider, you can also send messages to your care team and make appointments. If you have questions, please call your primary care clinic.  If you do not have a primary care provider, please call [phone] and they will assist you.        Care EveryWhere ID     This is your Care EveryWhere ID. This could be used by other organizations to access your [place] medical records  [id number]         Your Vitals Were     Pulse Temperature Pulse Oximetry BMI (Body Mass Index)          106 97.2  F (36.2  C) (Oral) 93% 28.53 kg/m2         Blood Pressure from Last 3 Encounters:   10/16/17 123/73   10/02/17 124/80   09/25/17 130/80    Weight from Last 3 Encounters:   10/16/17 175 lb 6.4 oz (79.6 kg)   10/02/17 170 lb 6.4 oz (77.3 kg)   09/25/17 167 lb 12.8 oz (76.1 kg)              We Performed the Following     UA reflex to Microscopic and Culture     Urine Culture Aerobic Bacterial     Urine Microscopic          Today's Medication Changes          These changes are accurate as of: 10/16/17 12:02 PM.  If you have any questions, ask your nurse or doctor.               Start taking these medicines.        Dose/Directions    sulfamethoxazole-trimethoprim 400-80 MG per tablet   Commonly known as:  BACTRIM/SEPTRA   Used for:  Dysuria   Started by:  Javan Canela MD        Dose:  1 tablet   Take 1 tablet by mouth 2 times daily   Quantity:  14 tablet   Refills:  0            Where to get your medicines      These medications were sent to [facility] [place], [place] - [address] [address] [facility]  [address] [facility] [address]     Phone:  [phone]     sulfamethoxazole-trimethoprim 400-80 MG per tablet                Primary Care Provider Office Phone # Fax #    Javan Canela [phone] [address] [facility] [address]        Equal Access to Services     BROOKS HOGAN AH: Hadii gabby ku hadasho Soomaali, waaxda luqadaha, qaybta kaalmada adeegyada, waxay queenie wells. So [facility] [phone].    ATENCIÓN: Si habla español, tiene a aden disposición servicios gratuitos de asistencia lingüística. Llame al [phone].    We comply with applicable federal civil rights laws and [place] laws. We do not discriminate on the basis of race, color, national origin, age, disability, sex, sexual orientation, or gender identity.            Thank you!      Thank you for choosing [facility]  for your care. Our goal is always to provide you with excellent care. Hearing back from our patients is one way we can continue to improve our services. Please take a few minutes to complete the written survey that you may receive in the mail after your visit with us. Thank you!             Your Updated Medication List - Protect others around you: Learn how to safely use, store and throw away your medicines at www.disposemymeds.org.          This list is accurate as of: 10/16/17 12:02 PM.  Always use your most recent med list.                   Brand Name Dispense Instructions for use Diagnosis    albuterol 108 (90 BASE) MCG/ACT Inhaler    PROAIR HFA/PROVENTIL HFA/VENTOLIN HFA    1 Inhaler    Inhale 2 puffs into the lungs every 4 hours as needed for shortness of breath / dyspnea    Moderate persistent asthma, uncomplicated       citalopram 20 MG tablet    celeXA    90 tablet    Take 1 tablet (20 mg) by mouth daily    Anxiety       losartan-hydrochlorothiazide 50-12.5 MG per tablet    HYZAAR    30 tablet    Take 1 tablet by mouth daily    Essential hypertension       minocycline 100 MG capsule    MINOCIN/DYNACIN    60 capsule    Take 1 capsule (100 mg) by mouth 2 times daily    Dermatitis, perioral       mometasone-formoterol 200-5 MCG/ACT oral inhaler    DULERA    39 g    Inhale 2 puffs into the lungs 2 times daily    Moderate persistent asthma, uncomplicated       omeprazole 20 MG CR capsule    priLOSEC    30 capsule    Take 1 capsule (20 mg) by mouth daily    Abdominal pain, epigastric       sulfamethoxazole-trimethoprim 400-80 MG per tablet    BACTRIM/SEPTRA    14 tablet    Take 1 tablet by mouth 2 times daily    Dysuria

## 2017-10-16 NOTE — PROGRESS NOTES
SUBJECTIVE:  61 year old.The patient has a history ofdysuria.  This started 1 week ago.   Associated symptoms are urgency.  Brought on by ? .  Better with nothing and has tried fluids. ROS no fever, chills      Reviewed health maintenance  Patient Active Problem List   Diagnosis     Insomnia     CARDIOVASCULAR SCREENING; LDL GOAL LESS THAN 130     Hypertension goal BP (blood pressure) < 140/90     Anxiety     Severe persistent asthma without complication     History of colonic polyps     Clavicle fracture     Closed displaced fracture of proximal phalanx of left great toe     Chest wall pain     Thoracic back pain     Advanced directives, counseling/discussion     Angioedema, initial encounter     Past Medical History:   Diagnosis Date     Generalized anxiety disorder      Hypertension goal BP (blood pressure) < 140/90      Insomnia      Intermittent asthma      Menopausal state      Osteopenia        OBJECTIVE:  no apparent distress  /73  Pulse 106  Temp 97.2  F (36.2  C) (Oral)  Wt 175 lb 6.4 oz (79.6 kg)  SpO2 93%  BMI 28.53 kg/m2    LUNGS:  CTA B/L, no wheezing or crackles.   Cardiovascular: negative, PMI normal. No lifts, heaves, or thrills. RRR. No murmurs, clicks gallops or rub   Gastrointestinal: Abdomen soft, non-tender. BS normal. No masses, organomegaly   UA RESULTS:  Recent Labs   Lab Test  10/16/17   1125   COLOR  Yellow   APPEARANCE  Slightly Cloudy   URINEGLC  Negative   URINEBILI  Negative   URINEKETONE  Negative   SG  <=1.005   UBLD  Trace*   URINEPH  7.0   PROTEIN  Negative   UROBILINOGEN  0.2   NITRITE  Positive*   LEUKEST  Large*   RBCU  O - 2   WBCU  *         ICD-10-CM    1. Dysuria R30.0 UA reflex to Microscopic and Culture     Urine Microscopic   2. Nonspecific finding on examination of urine R82.90 Urine Culture Aerobic Bacterial    PLAN: Retun to clinic if not improved

## 2017-10-16 NOTE — PROGRESS NOTES
PA in process on Dulera, will notify patient and Pharmacy of outcome once received. This was Started Fri 10/13/17 can take up to 14 days for response.    Deirdre Fishman,

## 2017-10-16 NOTE — TELEPHONE ENCOUNTER
The new blood pressure medication is not working. Also, can she be worked in today for bladder infection?  All other scheduling options have been exhausted.  Patient requests appointment with provider.

## 2017-10-16 NOTE — NURSING NOTE
"Chief Complaint   Patient presents with     Dysuria     x 1 week, has been taking OTC relief        Initial /73  Pulse 106  Temp 97.2  F (36.2  C) (Oral)  Wt 175 lb 6.4 oz (79.6 kg)  SpO2 93%  BMI 28.53 kg/m2 Estimated body mass index is 28.53 kg/(m^2) as calculated from the following:    Height as of 10/2/17: 5' 5.75\" (1.67 m).    Weight as of this encounter: 175 lb 6.4 oz (79.6 kg).  Medication Reconciliation: complete  Ezra Downs CMA    "

## 2017-10-17 LAB
BACTERIA SPEC CULT: ABNORMAL
SPECIMEN SOURCE: ABNORMAL

## 2017-10-18 ENCOUNTER — TELEPHONE (OUTPATIENT)
Dept: FAMILY MEDICINE | Facility: CLINIC | Age: 62
End: 2017-10-18

## 2017-10-18 DIAGNOSIS — N39.0 URINARY TRACT INFECTION: Primary | ICD-10-CM

## 2017-10-18 RX ORDER — BUDESONIDE AND FORMOTEROL FUMARATE DIHYDRATE 160; 4.5 UG/1; UG/1
2 AEROSOL RESPIRATORY (INHALATION) 2 TIMES DAILY
Qty: 3 INHALER | Refills: 3 | Status: SHIPPED | OUTPATIENT
Start: 2017-10-18 | End: 2018-07-11

## 2017-10-18 RX ORDER — AMOXICILLIN 875 MG
875 TABLET ORAL 2 TIMES DAILY
Qty: 20 TABLET | Refills: 0 | Status: SHIPPED | OUTPATIENT
Start: 2017-10-18 | End: 2018-02-09

## 2017-10-18 NOTE — TELEPHONE ENCOUNTER
Per verbal order read back Dr. Javan Canela:  Switch her from the dulera to the symbicort 160-4.5 inhaler, 2 puffs twice a day into lungs;  Qty: 3 with 3 refills for her asthma.  Prescription sent to pharmacy.  Laura Cespedes RN

## 2017-10-18 NOTE — TELEPHONE ENCOUNTER
PA FOR DULERA DENIED    Patient needs to try and fail SYMBOICORT.    Please change medication if patient has not tried and failed.    Deirdre Fishman,

## 2017-10-18 NOTE — TELEPHONE ENCOUNTER
Notes Recorded by Javan Canela MD on 10/18/2017 at 8:23 AM  The bladder infection you have is going to be resistent to the antibiotic we gave you.  We will need to change it to amoxicillin 875 bid for seven days    I spoke with patient.  She states understanding of these instructions.  Will stop current prescription and start the amoxicillin.  Did review instructions for dosing.  Kaleigh Cespedes RN

## 2018-01-18 DIAGNOSIS — I10 ESSENTIAL HYPERTENSION: ICD-10-CM

## 2018-01-18 RX ORDER — LOSARTAN POTASSIUM AND HYDROCHLOROTHIAZIDE 12.5; 5 MG/1; MG/1
TABLET ORAL
Qty: 90 TABLET | Refills: 1 | Status: SHIPPED | OUTPATIENT
Start: 2018-01-18 | End: 2018-07-11

## 2018-02-08 DIAGNOSIS — G47.00 PERSISTENT INSOMNIA: ICD-10-CM

## 2018-02-08 RX ORDER — TRAZODONE HYDROCHLORIDE 100 MG/1
TABLET ORAL
Qty: 180 TABLET | Refills: 0 | Status: SHIPPED | OUTPATIENT
Start: 2018-02-08 | End: 2018-07-01

## 2018-02-08 NOTE — TELEPHONE ENCOUNTER
Routing refill request to provider for review/approval because:  Drug not active on patient's medication list    Aisha Izquierdo RN

## 2018-02-09 ENCOUNTER — OFFICE VISIT (OUTPATIENT)
Dept: ENDOCRINOLOGY | Facility: CLINIC | Age: 63
End: 2018-02-09
Payer: COMMERCIAL

## 2018-02-09 ENCOUNTER — ALLIED HEALTH/NURSE VISIT (OUTPATIENT)
Dept: SURGERY | Facility: CLINIC | Age: 63
End: 2018-02-09
Payer: COMMERCIAL

## 2018-02-09 VITALS
SYSTOLIC BLOOD PRESSURE: 156 MMHG | BODY MASS INDEX: 27.77 KG/M2 | HEIGHT: 66 IN | WEIGHT: 172.8 LBS | OXYGEN SATURATION: 95 % | DIASTOLIC BLOOD PRESSURE: 97 MMHG | HEART RATE: 85 BPM

## 2018-02-09 DIAGNOSIS — E65 ABDOMINAL OBESITY: Primary | ICD-10-CM

## 2018-02-09 RX ORDER — MOMETASONE FUROATE AND FORMOTEROL FUMARATE DIHYDRATE 200; 5 UG/1; UG/1
AEROSOL RESPIRATORY (INHALATION)
COMMUNITY
Start: 2017-08-10 | End: 2018-02-09

## 2018-02-09 RX ORDER — TOPIRAMATE 25 MG/1
TABLET, FILM COATED ORAL
Qty: 90 TABLET | Refills: 3 | Status: SHIPPED | OUTPATIENT
Start: 2018-02-09 | End: 2018-06-08

## 2018-02-09 NOTE — LETTER
"2018       RE: Ludy Ramos  [address]  [place] [address]     Dear Colleague,    Thank you for referring your patient, Ludy Ramos, to the [facility] MEDICAL WEIGHT MANAGEMENT at [facility]. Please see a copy of my visit note below.            New Medical Weight Management Consult    PATIENT:  Ludy Ramos  MRN:         [id number]  :         1955  AMBROSIO:         2018    Dear Javan Canela,    I had the pleasure of seeing your patient, Ludy Ramos.  Full intake/assessment done to determine barriers to weight loss success and develop a treatment plan.  Ludy Ramos is a 62 year old female interested in treatment of medical problems associated with weight.  Her weight today is 172 lbs 12.8 oz, Body mass index is 28.1 kg/(m^2)., and she has the following co-morbidities:     2018   I have the following co-morbidities associated with obesity: High Blood Pressure       Patient Goals Reviewed With Patient 2018   I am interested in attaining a healthier weight to diminish current health problems related to co-morbid conditions: Yes   I am interested in attaining a healthier weight in order to prevent future health problems: Yes       Referring Provider 2018   Please name the provider who referred you to Medical Weight Management.  If you do not know, please answer: \"I Don't Know\". none       Wt Readings from Last 4 Encounters:   18 78.4 kg (172 lb 12.8 oz)   10/16/17 79.6 kg (175 lb 6.4 oz)   10/02/17 77.3 kg (170 lb 6.4 oz)   17 76.1 kg (167 lb 12.8 oz)       Weight History Reviewed With Patient 2018   How concerned are you about your weight? Very Concerned   Would you describe your weight gain as gradual? Yes   I became overweight: As an Adult   The following factors have contributed to my weight gain:  After Quitting Smoking, Lack of Exercise   I have tried the following methods to lose weight: " Exercise, Weight Watchers, Christie Christie   I have the following family history of obesity/being overweight:  One or more of my siblings are overweight   Has anyone in your family had weight loss surgery? No   started gaining 10 yrs ago  Had to start up a different career; change sparked stress-related eating  Quit smoking about 16 yrs ago  Stopped exercising about a yr ago--harder now because of the wt gain    Diet Recall Reviewed With Patient 1/28/2018   How many glasses of juice do you drink in a typical day? 1   How many of glasses of milk do you drink in a typical day? 1   How many 8oz glasses of sugar containing drinks such as Davey-Aid/sweet tea do you drink in a day? 0   How many cans/bottles of sugar pop/soda/tea/sports drinks do you drink in a day? 1   How many cans/bottles of diet pop/soda/tea or sports drink do you drink in a day? 1   How often do you have a drink of alcohol? 4 or More Times a Week   If you do drink, how many drinks might you have in a day? 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 Almost Everyday   Generally, my meals include foods like these: fried meats, brats, burgers, french fries, pizza, cheese, chips, or ice cream. A Few Times a Week   Eat fast food (like [employer], [employer], Taco Bell). A Few Times a Week   Eat at a buffet or sit-down restaurant. A Few Times a Week   Eat most of my meals in front of the TV or computer. Never   Often skip meals, eat at random times, have no regular eating times. Almost Everyday   Rarely sit down for a meal but snack or graze throughout.  Half of the Week   Eat extra snacks between meals. A Few Times a Week   Eat most of my food at the end of the day. Almost Everyday   Eat in the middle of the night or wake up at night to eat. Never   Eat extra snacks to  prevent or correct low blood sugar. Never   Eat to prevent acid reflux or stomach pain. Never   Worry about not having enough food to eat. Never   Have you been to the food shelf at least a few times this year? No   I eat when I am depressed, stressed, anxious, or bored. Almost Everyday   I eat when I am happy or as a reward. Almost Everyday   I feel hungry all the time even if I just have eaten. A Few Times a Week   Feeling full is important to me. Half of the Week   Once I start eating, it is hard to stop. Never   I finish all the food on my plate even if I am already full. Almost Everyday   I can't resist eating delicious food or walk past the good food/smell. Almost Everyday   I eat/snack without noticing that I am eating. Never   I eat when I am preparing the meal. A Few Times a Week   I eat more than usual when I see others eating. Never   I have trouble not eating sweets, ice cream, cookies, or chips if they are around the house. Never   I think about food all day. Almost Everyday   Please list any other foods you crave? pastas   I feel out of control when eating. Never   I eat a large amount of food, like a loaf of bread, a box of cookies, a pint/quart of ice cream, all at once. Never   I eat a large amount of food even when I am not hungry. Monthly   I eat rapidly. Never   I eat alone because I feel embarrassed and do not want others to see how much I have eaten. Never   I eat until I am uncomfortably full. Monthly   I feel bad, disgusted, or guilty after I overeat. Almost Everyday   I make myself vomit what I have eaten or use laxatives to get rid of food. Never       Activity/Exercise History Reviewed With Patient 1/28/2018   How much of a typical 12 hour day do you spend sitting? Half the Day   How much of a typical 12 hour day do you spend lying down? Less Than Half the Day   How much of a typical day do you spend walking/standing? Half the Day   How many hours (not including work) do you spend on the  TV/Video Games/Computer/Tablet/Phone? 2-3 Hours   How many times a week are you active for the purpose of exercise? Never   How many total minutes do you spend doing some activity for the purpose of exercising when you exercise? None   What keeps you from being more active? Shortness of Breath       ROS   Answers for HPI/ROS submitted by the patient on 1/28/2018   General Symptoms: No  Skin Symptoms: No  HENT Symptoms: No  EYE SYMPTOMS: No  HEART SYMPTOMS: No  LUNG SYMPTOMS: No  INTESTINAL SYMPTOMS: No  URINARY SYMPTOMS: No  GYNECOLOGIC SYMPTOMS: No  BREAST SYMPTOMS: No  SKELETAL SYMPTOMS: No  BLOOD SYMPTOMS: No  NERVOUS SYSTEM SYMPTOMS: No  MENTAL HEALTH SYMPTOMS: No    PAST MEDICAL HISTORY:  Past Medical History:   Diagnosis Date     Generalized anxiety disorder      Hypertension goal BP (blood pressure) < 140/90      Insomnia      Intermittent asthma      Menopausal state      Osteopenia        Work/Social History Reviewed With Patient 1/28/2018   My employment status is: Part-Time   My job is: PCA   How much of your job is spent on the computer or phone? Less Than 50%   What is your marital status? /In a Relationship   If in a relationship, is your significant other overweight? N/A   Do you have children? No   If you have children, are they overweight? N/A       Mental Health History Reviewed With Patient 1/28/2018   Have you ever been physically or sexually abused? No   How often in the past 2 weeks have you felt little interest or pleasure in doing things? For Several Days   Over the past 2 weeks how often have you felt down, depressed, or hopeless? Not at all       Sleep History Reviewed With Patient 1/28/2018   How many hours do you sleep at night? 8   Do you think that you snore loudly or has anybody ever heard you snore loudly (louder than talking or so loud it can be heard behind a shut door)? No   Has anyone seen or heard you stop breathing during your sleep? No   Do you often feel tired,  "fatigued, or sleepy during the day? Yes       MEDICATIONS:   Current Outpatient Prescriptions   Medication Sig Dispense Refill     traZODone (DESYREL) 100 MG tablet TAKE TWO TABLETS BY MOUTH AT BEDTIME 180 tablet 0     losartan-hydrochlorothiazide (HYZAAR) 50-12.5 MG per tablet TAKE ONE TABLET BY MOUTH ONCE DAILY 90 tablet 1     budesonide-formoterol (SYMBICORT) 160-4.5 MCG/ACT Inhaler Inhale 2 puffs into the lungs 2 times daily 3 Inhaler 3     citalopram (CELEXA) 20 MG tablet Take 1 tablet (20 mg) by mouth daily 90 tablet 1     albuterol (PROAIR HFA/PROVENTIL HFA/VENTOLIN HFA) 108 (90 BASE) MCG/ACT Inhaler Inhale 2 puffs into the lungs every 4 hours as needed for shortness of breath / dyspnea 1 Inhaler 3     omeprazole (PRILOSEC) 20 MG CR capsule Take 1 capsule (20 mg) by mouth daily (Patient not taking: Reported on 10/16/2017) 30 capsule 1     minocycline (MINOCIN/DYNACIN) 100 MG capsule Take 1 capsule (100 mg) by mouth 2 times daily (Patient not taking: Reported on 10/16/2017) 60 capsule 2     Labs reviewed--fall 2017 had random glucose in the 120s, nl SCr    ALLERGIES:   No Known Allergies    PHYSICAL EXAM:  BP (!) 156/97  Pulse 85  Ht 1.67 m (5' 5.75\")  Wt 78.4 kg (172 lb 12.8 oz)  SpO2 95%  BMI 28.1 kg/m2   A & O x 3  HEENT: NCAT, mucous membranes moist  Respirations unlabored  Location of obesity: Central Obesity    ASSESSMENT:  Ludy is a patient with mature onset abd abd obesity (BMI in overwt category with HTN co-morbidity) with significant element of familial/genetic influence and with current health consequences.  Ludy Ramos eats a high carb diet, eats a high fat diet, eats fast food once or more per week, uses food as a reward, uses food as mood management, has perception of intense hunger, mostly eats during the evening-significant amount of liquid calories also    Her problem is complicated by strong craving/reward pathways and can work also on shifting ifestyle choices        PLAN:  "   Decrease portion sizes  Decrease eating out  Purge house of food triggers  Decrease caloric beverages by reducing ETOH, stopping sugared soda, stopping juice  Dietician visit of education  Volumetrics eating plan  Calorie/low fat diet    Craving/Reward   Ancillary testing:  N/A.  Food Plan:  Volumetrics and High protein/low carbohydrate.   Activity Plan:  Walking as tolerated.  Supplementary:  Referral to psychology if pt wants in the future--discussed today and she does not at present want a referral.   Medication:  The patient will begin medication in pursuit of improved medical status as influenced by body weight. She will start topiramate. Patient was made aware that topiramate is not approved for the treatment of obesity.  There is a mutual understanding of the goals and risks of this therapy. The patient is in agreement. She is educated on dosage regimen and possible side effects.      RTC:    12 weeks at [place]--can have return appointment with Tre Lora    TIME: 40/45 min spent on evaluation, management, counseling, education, & motivational interviewing with greater than 50 % of the total time was spent on counseling and coordinating care    Sincerely,    Elvis Boston MD

## 2018-02-09 NOTE — PROGRESS NOTES
"        New Medical Weight Management Consult    PATIENT:  Ludy Ramos  MRN:         [id number]  :         1955  AMBROSIO:         2018    Dear Hilton, Javan Zimmerman,    I had the pleasure of seeing your patient, Ludy Ramos.  Full intake/assessment done to determine barriers to weight loss success and develop a treatment plan.  Ludy Ramos is a 62 year old female interested in treatment of medical problems associated with weight.  Her weight today is 172 lbs 12.8 oz, Body mass index is 28.1 kg/(m^2)., and she has the following co-morbidities:     2018   I have the following co-morbidities associated with obesity: High Blood Pressure       Patient Goals Reviewed With Patient 2018   I am interested in attaining a healthier weight to diminish current health problems related to co-morbid conditions: Yes   I am interested in attaining a healthier weight in order to prevent future health problems: Yes       Referring Provider 2018   Please name the provider who referred you to Medical Weight Management.  If you do not know, please answer: \"I Don't Know\". none       Wt Readings from Last 4 Encounters:   18 78.4 kg (172 lb 12.8 oz)   10/16/17 79.6 kg (175 lb 6.4 oz)   10/02/17 77.3 kg (170 lb 6.4 oz)   17 76.1 kg (167 lb 12.8 oz)       Weight History Reviewed With Patient 2018   How concerned are you about your weight? Very Concerned   Would you describe your weight gain as gradual? Yes   I became overweight: As an Adult   The following factors have contributed to my weight gain:  After Quitting Smoking, Lack of Exercise   I have tried the following methods to lose weight: Exercise, Weight Watchers, Christie Pratik   I have the following family history of obesity/being overweight:  One or more of my siblings are overweight   Has anyone in your family had weight loss surgery? No   started gaining 10 yrs ago  Had to start up a different career; change sparked stress-related " eating  Quit smoking about 16 yrs ago  Stopped exercising about a yr ago--harder now because of the wt gain    Diet Recall Reviewed With Patient 1/28/2018   How many glasses of juice do you drink in a typical day? 1   How many of glasses of milk do you drink in a typical day? 1   How many 8oz glasses of sugar containing drinks such as Davey-Aid/sweet tea do you drink in a day? 0   How many cans/bottles of sugar pop/soda/tea/sports drinks do you drink in a day? 1   How many cans/bottles of diet pop/soda/tea or sports drink do you drink in a day? 1   How often do you have a drink of alcohol? 4 or More Times a Week   If you do drink, how many drinks might you have in a day? 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 Almost Everyday   Generally, my meals include foods like these: fried meats, brats, burgers, french fries, pizza, cheese, chips, or ice cream. A Few Times a Week   Eat fast food (like [employer], [employer], Taco Bell). A Few Times a Week   Eat at a buffet or sit-down restaurant. A Few Times a Week   Eat most of my meals in front of the TV or computer. Never   Often skip meals, eat at random times, have no regular eating times. Almost Everyday   Rarely sit down for a meal but snack or graze throughout.  Half of the Week   Eat extra snacks between meals. A Few Times a Week   Eat most of my food at the end of the day. Almost Everyday   Eat in the middle of the night or wake up at night to eat. Never   Eat extra snacks to prevent or correct low blood sugar. Never   Eat to prevent acid reflux or stomach pain. Never   Worry about not having enough food to eat. Never   Have you been to the food shelf at least a few times this year? No   I eat when I am depressed, stressed, anxious, or bored. Almost Everyday   I eat when I  am happy or as a reward. Almost Everyday   I feel hungry all the time even if I just have eaten. A Few Times a Week   Feeling full is important to me. Half of the Week   Once I start eating, it is hard to stop. Never   I finish all the food on my plate even if I am already full. Almost Everyday   I can't resist eating delicious food or walk past the good food/smell. Almost Everyday   I eat/snack without noticing that I am eating. Never   I eat when I am preparing the meal. A Few Times a Week   I eat more than usual when I see others eating. Never   I have trouble not eating sweets, ice cream, cookies, or chips if they are around the house. Never   I think about food all day. Almost Everyday   Please list any other foods you crave? pastas   I feel out of control when eating. Never   I eat a large amount of food, like a loaf of bread, a box of cookies, a pint/quart of ice cream, all at once. Never   I eat a large amount of food even when I am not hungry. Monthly   I eat rapidly. Never   I eat alone because I feel embarrassed and do not want others to see how much I have eaten. Never   I eat until I am uncomfortably full. Monthly   I feel bad, disgusted, or guilty after I overeat. Almost Everyday   I make myself vomit what I have eaten or use laxatives to get rid of food. Never       Activity/Exercise History Reviewed With Patient 1/28/2018   How much of a typical 12 hour day do you spend sitting? Half the Day   How much of a typical 12 hour day do you spend lying down? Less Than Half the Day   How much of a typical day do you spend walking/standing? Half the Day   How many hours (not including work) do you spend on the TV/Video Games/Computer/Tablet/Phone? 2-3 Hours   How many times a week are you active for the purpose of exercise? Never   How many total minutes do you spend doing some activity for the purpose of exercising when you exercise? None   What keeps you from being more active? Shortness of Breath       ROS    Answers for HPI/ROS submitted by the patient on 1/28/2018   General Symptoms: No  Skin Symptoms: No  HENT Symptoms: No  EYE SYMPTOMS: No  HEART SYMPTOMS: No  LUNG SYMPTOMS: No  INTESTINAL SYMPTOMS: No  URINARY SYMPTOMS: No  GYNECOLOGIC SYMPTOMS: No  BREAST SYMPTOMS: No  SKELETAL SYMPTOMS: No  BLOOD SYMPTOMS: No  NERVOUS SYSTEM SYMPTOMS: No  MENTAL HEALTH SYMPTOMS: No    PAST MEDICAL HISTORY:  Past Medical History:   Diagnosis Date     Generalized anxiety disorder      Hypertension goal BP (blood pressure) < 140/90      Insomnia      Intermittent asthma      Menopausal state      Osteopenia        Work/Social History Reviewed With Patient 1/28/2018   My employment status is: Part-Time   My job is: [employer]   How much of your job is spent on the computer or phone? Less Than 50%   What is your marital status? /In a Relationship   If in a relationship, is your significant other overweight? N/A   Do you have children? No   If you have children, are they overweight? N/A       Mental Health History Reviewed With Patient 1/28/2018   Have you ever been physically or sexually abused? No   How often in the past 2 weeks have you felt little interest or pleasure in doing things? For Several Days   Over the past 2 weeks how often have you felt down, depressed, or hopeless? Not at all       Sleep History Reviewed With Patient 1/28/2018   How many hours do you sleep at night? 8   Do you think that you snore loudly or has anybody ever heard you snore loudly (louder than talking or so loud it can be heard behind a shut door)? No   Has anyone seen or heard you stop breathing during your sleep? No   Do you often feel tired, fatigued, or sleepy during the day? Yes       MEDICATIONS:   Current Outpatient Prescriptions   Medication Sig Dispense Refill     traZODone (DESYREL) 100 MG tablet TAKE TWO TABLETS BY MOUTH AT BEDTIME 180 tablet 0     losartan-hydrochlorothiazide (HYZAAR) 50-12.5 MG per tablet TAKE ONE TABLET BY MOUTH ONCE  "DAILY 90 tablet 1     budesonide-formoterol (SYMBICORT) 160-4.5 MCG/ACT Inhaler Inhale 2 puffs into the lungs 2 times daily 3 Inhaler 3     citalopram (CELEXA) 20 MG tablet Take 1 tablet (20 mg) by mouth daily 90 tablet 1     albuterol (PROAIR HFA/PROVENTIL HFA/VENTOLIN HFA) 108 (90 BASE) MCG/ACT Inhaler Inhale 2 puffs into the lungs every 4 hours as needed for shortness of breath / dyspnea 1 Inhaler 3     omeprazole (PRILOSEC) 20 MG CR capsule Take 1 capsule (20 mg) by mouth daily (Patient not taking: Reported on 10/16/2017) 30 capsule 1     minocycline (MINOCIN/DYNACIN) 100 MG capsule Take 1 capsule (100 mg) by mouth 2 times daily (Patient not taking: Reported on 10/16/2017) 60 capsule 2     Labs reviewed--fall 2017 had random glucose in the 120s, nl SCr    ALLERGIES:   No Known Allergies    PHYSICAL EXAM:  BP (!) 156/97  Pulse 85  Ht 1.67 m (5' 5.75\")  Wt 78.4 kg (172 lb 12.8 oz)  SpO2 95%  BMI 28.1 kg/m2   A & O x 3  HEENT: NCAT, mucous membranes moist  Respirations unlabored  Location of obesity: Central Obesity    ASSESSMENT:  Ludy is a patient with mature onset abd abd obesity (BMI in overwt category with HTN co-morbidity) with significant element of familial/genetic influence and with current health consequences.  Ludy Ramos eats a high carb diet, eats a high fat diet, eats fast food once or more per week, uses food as a reward, uses food as mood management, has perception of intense hunger, mostly eats during the evening-significant amount of liquid calories also    Her problem is complicated by strong craving/reward pathways and can work also on shifting ifestyle choices        PLAN:    Decrease portion sizes  Decrease eating out  Purge house of food triggers  Decrease caloric beverages by reducing ETOH, stopping sugared soda, stopping juice  Dietician visit of education  Volumetrics eating plan  Calorie/low fat diet    Craving/Reward   Ancillary testing:  N/A.  Food Plan:  Volumetrics and High " protein/low carbohydrate.   Activity Plan:  Walking as tolerated.  Supplementary:  Referral to psychology if pt wants in the future--discussed today and she does not at present want a referral.   Medication:  The patient will begin medication in pursuit of improved medical status as influenced by body weight. She will start topiramate. Patient was made aware that topiramate is not approved for the treatment of obesity.  There is a mutual understanding of the goals and risks of this therapy. The patient is in agreement. She is educated on dosage regimen and possible side effects.      RTC:    12 weeks at [place]--can have return appointment with Tre Lora    TIME: 40/45 min spent on evaluation, management, counseling, education, & motivational interviewing with greater than 50 % of the total time was spent on counseling and coordinating care    Sincerely,    Elvis Boston MD

## 2018-02-09 NOTE — MR AVS SNAPSHOT
MRN:[id number]                      After Visit Summary   2/9/2018    Ludy Ramos    MRN: [id number]           Visit Information        Provider Department      2/9/2018 11:00 AM Donna Estevez RD [facility] Surgical Weight Management        Your next 10 appointments already scheduled     Jun 08, 2018 11:20 AM CDT   (Arrive by 11:05 AM)   Return Visit with MD DADA Reed [facility] Medical Weight Management ([facility])    [address] [place] [id number] [phone]              MyChart Information     [employer] gives you secure access to your electronic health record. If you see a primary care provider, you can also send messages to your care team and make appointments. If you have questions, please call your primary care clinic.  If you do not have a primary care provider, please call [phone] and they will assist you.      Rollbar is an electronic gateway that provides easy, online access to your medical records. With Rollbar, you can request a clinic appointment, read your test results, renew a prescription or communicate with your care team.     To access your existing account, please contact your [facility] or call [phone] for assistance.        Care EveryWhere ID     This is your Care EveryWhere ID. This could be used by other organizations to access your [facility] medical records  [id number]        Equal Access to Services     BERNY HOGAN : Hadii aad ku hadasho Sochaparritaali, waaxda luqadaha, qaybta kaalmada zeinab, vasiliy wells. So [facility] [phone].    ATENCIÓN: Si habla español, tiene a aden disposición servicios gratuitos de asistencia lingüística. Llame al [phone].    We comply with applicable federal civil rights laws and [place] laws. We do not discriminate on the basis of race, color, national origin, age, disability, sex, sexual  orientation, or gender identity.

## 2018-02-09 NOTE — MR AVS SNAPSHOT
After Visit Summary   2/9/2018    Ludy Ramos    MRN: [id number]           Patient Information     Date Of Birth          1955        Visit Information        Provider Department      2/9/2018 10:00 AM Elvis Boston MD [facility] Medical Weight Management        Care Instructions    Return with Dr. Tre Lora at [place] if possible in 3 months    Dietitian visit today  Or near future      MEDICATION STARTED AT THIS APPOINTMENT  We are starting topiramate at bedtime.  Start one tab, 25 mg, for a week. Go up to 50 mg (2 tabs) for the next week. At the third week, take   3 tabs (75 mg).  Stay at 3 tabs until you are seen again. Call the nurse at [phone] if you have any questions or concerns. (Do not stop taking it if you don't think it's working. For some people it works even though they do not feel much different.)    Topiramate (Topamax) is a medication that is used most often to treat migraine headaches or for seizures. It has also been found to help with weight loss. Although it's not currently FDA approved for weight loss, it has been used safely for a number of years to help people who are carrying extra weight.     Just how topiramate helps with weight loss has not been exactly determined. However it seems to work on areas of the brain to quiet down signals related to eating.      Topiramate may make you:    >feel less interest in eating in between meals   >think less about food and eating   >find it easier to push the plate away   >find giving up pop easier    >have an easier time eating less    For some of our patients, the pills work right away. They feel and think quite differently about food. Other patients don't feel much of a change but find in fact they have lost weight! Like all weight loss medications, topiramate works best when you help it work.  This means:    1) Have less tempting high calorie (fattening) food around the house or office    2) Have  lower calorie food (fruits, vegetables,low fat meats and dairy) for snacks    3) Eat out only one time or less each week.   4) Eat your meals at a table with the TV or computer off.    Side-effects. Topiramate is generally well tolerated. The main side-effects we see are:   Tingling in hands,feet, or face (usually not very troublesome)   Mental confusion and word finding trouble (about 10% of patients have this.)     Feeling sleepy or a bit dopey- this goes away very soon after starting.    One of the dangers of topiramate is the possibility of birth defects--if you get pregnant when you are on it, there is the risk that your baby will be born with a cleft lip or palate.  If you are on topiramate and of child bearing age, you need to be on a reliable form of birth control or refrain from sexual intercourse.     Please refer to the pharmacy insert for more information on side-effects. Since many pharmacists are not familiar with the use of topiramate in weight loss, calling the clinic will get you the most accurate information on the use of this medication for weight loss.     In order to get refills of this or any medication we prescribe you must be seen in the medical weight mgmt clinic every 2-3 months. Please have your pharmacy fax a refill request to [phone].                                             Follow-ups after your visit        Who to contact     Please call your clinic at [phone] to:    Ask questions about your health    Make or cancel appointments    Discuss your medicines    Learn about your test results    Speak to your doctor            Additional Information About Your Visit        [employer] Information     Datalot gives you secure access to your electronic health record. If you see a primary care provider, you can also send messages to your care team and make appointments. If you have questions, please call your primary care clinic.  If you do not have a primary care provider, please call  "[phone] and they will assist you.      KeepFu is an electronic gateway that provides easy, online access to your medical records. With KeepFu, you can request a clinic appointment, read your test results, renew a prescription or communicate with your care team.     To access your existing account, please contact your [facility] or call [phone] for assistance.        Care EveryWhere ID     This is your Care EveryWhere ID. This could be used by other organizations to access your [facility] medical records  [id number]        Your Vitals Were     Pulse Height Pulse Oximetry BMI (Body Mass Index)          85 1.67 m (5' 5.75\") 95% 28.1 kg/m2         Blood Pressure from Last 3 Encounters:   02/09/18 (!) 156/97   10/16/17 123/73   10/02/17 124/80    Weight from Last 3 Encounters:   02/09/18 78.4 kg (172 lb 12.8 oz)   10/16/17 79.6 kg (175 lb 6.4 oz)   10/02/17 77.3 kg (170 lb 6.4 oz)              Today, you had the following     No orders found for display         Today's Medication Changes          These changes are accurate as of 2/9/18 10:34 AM.  If you have any questions, ask your nurse or doctor.               Stop taking these medicines if you haven't already. Please contact your care team if you have questions.     amoxicillin 875 MG tablet   Commonly known as:  AMOXIL   Stopped by:  Elvis Boston MD           DULERA 200-5 MCG/ACT oral inhaler   Generic drug:  mometasone-formoterol   Stopped by:  Elvis Boston MD           sulfamethoxazole-trimethoprim 400-80 MG per tablet   Commonly known as:  BACTRIM/SEPTRA   Stopped by:  Elvis Boston MD                    Primary Care Provider Office Phone # Fax #    Javan Erasmo Canela [phone] [address] [address] [facility] [address]        Equal Access to Services     BERNY HOGAN AH: Hadii gabby resendiz hadasho Soomaali, waaxda luqadaha, qaybta kaalmada zeinab, vasiliy garcia " riya alcantaranahidleonid chanceBeckyaan ah. So [facility] [phone].    ATENCIÓN: Si habla fransisca, tiene a aden disposición servicios gratuitos de asistencia lingüística. [place] al [phone].    We comply with applicable federal civil rights laws and [place] laws. We do not discriminate on the basis of race, color, national origin, age, disability, sex, sexual orientation, or gender identity.            Thank you!     Thank you for choosing [facility] MEDICAL WEIGHT MANAGEMENT  for your care. Our goal is always to provide you with excellent care. Hearing back from our patients is one way we can continue to improve our services. Please take a few minutes to complete the written survey that you may receive in the mail after your visit with us. Thank you!             Your Updated Medication List - Protect others around you: Learn how to safely use, store and throw away your medicines at www.disposemymeds.org.          This list is accurate as of 2/9/18 10:34 AM.  Always use your most recent med list.                   Brand Name Dispense Instructions for use Diagnosis    albuterol 108 (90 BASE) MCG/ACT Inhaler    PROAIR HFA/PROVENTIL HFA/VENTOLIN HFA    1 Inhaler    Inhale 2 puffs into the lungs every 4 hours as needed for shortness of breath / dyspnea    Moderate persistent asthma, uncomplicated       budesonide-formoterol 160-4.5 MCG/ACT Inhaler    SYMBICORT    3 Inhaler    Inhale 2 puffs into the lungs 2 times daily    Moderate persistent asthma, uncomplicated       citalopram 20 MG tablet    celeXA    90 tablet    Take 1 tablet (20 mg) by mouth daily    Anxiety       losartan-hydrochlorothiazide 50-12.5 MG per tablet    HYZAAR    90 tablet    TAKE ONE TABLET BY MOUTH ONCE DAILY    Essential hypertension       minocycline 100 MG capsule    MINOCIN/DYNACIN    60 capsule    Take 1 capsule (100 mg) by mouth 2 times daily    Dermatitis, perioral       omeprazole 20 MG CR capsule    priLOSEC    30 capsule    Take 1 capsule (20 mg) by mouth  daily    Abdominal pain, epigastric       traZODone 100 MG tablet    DESYREL    180 tablet    TAKE TWO TABLETS BY MOUTH AT BEDTIME    Persistent insomnia

## 2018-02-09 NOTE — PATIENT INSTRUCTIONS
Return with Dr. Tre Lora at [place] if possible in 3 months    Dietitian visit today  Or near future      MEDICATION STARTED AT THIS APPOINTMENT  We are starting topiramate at bedtime.  Start one tab, 25 mg, for a week. Go up to 50 mg (2 tabs) for the next week. At the third week, take   3 tabs (75 mg).  Stay at 3 tabs until you are seen again. Call the nurse at [phone] if you have any questions or concerns. (Do not stop taking it if you don't think it's working. For some people it works even though they do not feel much different.)    Topiramate (Topamax) is a medication that is used most often to treat migraine headaches or for seizures. It has also been found to help with weight loss. Although it's not currently FDA approved for weight loss, it has been used safely for a number of years to help people who are carrying extra weight.     Just how topiramate helps with weight loss has not been exactly determined. However it seems to work on areas of the brain to quiet down signals related to eating.      Topiramate may make you:    >feel less interest in eating in between meals   >think less about food and eating   >find it easier to push the plate away   >find giving up pop easier    >have an easier time eating less    For some of our patients, the pills work right away. They feel and think quite differently about food. Other patients don't feel much of a change but find in fact they have lost weight! Like all weight loss medications, topiramate works best when you help it work.  This means:    1) Have less tempting high calorie (fattening) food around the house or office    2) Have lower calorie food (fruits, vegetables,low fat meats and dairy) for snacks    3) Eat out only one time or less each week.   4) Eat your meals at a table with the TV or computer off.    Side-effects. Topiramate is generally well tolerated. The main side-effects we see are:   Tingling in hands,feet, or face (usually  not very troublesome)   Mental confusion and word finding trouble (about 10% of patients have this.)     Feeling sleepy or a bit dopey- this goes away very soon after starting.    One of the dangers of topiramate is the possibility of birth defects--if you get pregnant when you are on it, there is the risk that your baby will be born with a cleft lip or palate.  If you are on topiramate and of child bearing age, you need to be on a reliable form of birth control or refrain from sexual intercourse.     Please refer to the pharmacy insert for more information on side-effects. Since many pharmacists are not familiar with the use of topiramate in weight loss, calling the clinic will get you the most accurate information on the use of this medication for weight loss.     In order to get refills of this or any medication we prescribe you must be seen in the medical weight mgmt clinic every 2-3 months. Please have your pharmacy fax a refill request to [phone].

## 2018-02-09 NOTE — NURSING NOTE
"Chief Complaint   Patient presents with     Weight Problem     RMWM       Vitals:    02/09/18 0949   BP: (!) 156/97   Pulse: 85   SpO2: 95%   Weight: 172 lb 12.8 oz   Height: 5' 5.75\"       Body mass index is 28.1 kg/(m^2).    Ramona Lopes CMA                       "

## 2018-02-09 NOTE — PROGRESS NOTES
"Ludy Ramos is a 62 year old female presents today for new weight management nutrition consultation.  Patient was referred by Dr Boston(2/9/18).    Estimated body mass index is 28.1 kg/(m^2) as calculated from the following:    Height as of an earlier encounter on 2/9/18: 1.67 m (5' 5.75\").    Weight as of an earlier encounter on 2/9/18: 78.4 kg (172 lb 12.8 oz).    Nutrition history  See MD note for details.    Nutrition Prescription  Volumetrics, 1200 calories/day (per MD)     Nutrition Diagnosis  Food and nutrition related knowledge deficit r/t lack of prior exposure to calorie counting and nutrition education aeb pt unable to verbalize understanding of 1200 calorie/day diet for weight loss.    Nutrition Intervention  Materials/education provided on 1200 calorie/day diet with portion control and healthy food choices (Volumetric diet), 100 calorie snack choices, meal and snack planning and websites, sample meal plans.  Patient reported drinking 4-5 beers or 3 1.5 ounces of vodka and regular coke daily.  Discussed trying to reduce calorie containing beverages.  Patient reported that she will snack on chips and cheetos but it may take a few days for her to finish a small bag.  Provided patient with 1200 calorie sample menus.    Patient Understanding: fair/good  Expected Compliance: fair/good  Follow-Up Plans: PRN     Nutrition Goals  1) Pt to follow 1200 calorie/day diet  2) Weight loss    Follow-Up:  PRN    Time spent with patient: 30 minutes.  Donna Estevez, RD, LD        "

## 2018-02-23 DIAGNOSIS — F41.9 ANXIETY: ICD-10-CM

## 2018-02-23 RX ORDER — CITALOPRAM HYDROBROMIDE 20 MG/1
TABLET ORAL
Qty: 90 TABLET | Refills: 0 | Status: SHIPPED | OUTPATIENT
Start: 2018-02-23 | End: 2018-05-28

## 2018-03-14 ENCOUNTER — TELEPHONE (OUTPATIENT)
Dept: FAMILY MEDICINE | Facility: CLINIC | Age: 63
End: 2018-03-14

## 2018-03-14 NOTE — TELEPHONE ENCOUNTER
Panel Management Review      Patient has the following on her problem list:     Asthma review     ACT Total Scores 10/2/2017   ACT TOTAL SCORE -   ASTHMA ER VISITS -   ASTHMA HOSPITALIZATIONS -   ACT TOTAL SCORE (Goal Greater than or Equal to 20) 17   In the past 12 months, how many times did you visit the emergency room for your asthma without being admitted to the hospital? 0   In the past 12 months, how many times were you hospitalized overnight because of your asthma? 0      1. Is Asthma diagnosis on the Problem List? Yes    2. Is Asthma listed on Health Maintenance? Yes    3. Patient is due for:  ACT    Hypertension   Last three blood pressure readings:  BP Readings from Last 3 Encounters:   02/09/18 (!) 156/97   10/16/17 123/73   10/02/17 124/80     Blood pressure: FAILED    HTN Guidelines:  Age 18-59 BP range:  Less than 140/90  Age 60-85 with Diabetes:  Less than 140/90  Age 60-85 without Diabetes:  less than 150/90      Composite cancer screening  Chart review shows that this patient is due/due soon for the following Mammogram  Summary:    Patient is due/failing the following:   ACT, BP CHECK and MAMMOGRAM    Action needed:   Patient needs office visit for blood pressure check, mammogram, and ACT.    Type of outreach:    Sent iWelcome message.    Questions for provider review:    None                                                                                                                                    Ezra Downs CMA       Chart routed to closed .

## 2018-04-30 ENCOUNTER — TELEPHONE (OUTPATIENT)
Dept: ENDOCRINOLOGY | Facility: CLINIC | Age: 63
End: 2018-04-30

## 2018-04-30 ENCOUNTER — MYC MEDICAL ADVICE (OUTPATIENT)
Dept: FAMILY MEDICINE | Facility: CLINIC | Age: 63
End: 2018-04-30

## 2018-04-30 DIAGNOSIS — R05.9 COUGH: ICD-10-CM

## 2018-04-30 NOTE — TELEPHONE ENCOUNTER
Patient states that the Topamax does not seem to work as well and tingling into her hands and arms.     Patient stopped taking the medication, due to the symptoms. Feels like she is putting the weight back on already.     She is wondering if there is something else that can be prescribed.

## 2018-05-01 RX ORDER — CODEINE PHOSPHATE AND GUAIFENESIN 10; 100 MG/5ML; MG/5ML
2 SOLUTION ORAL EVERY 4 HOURS PRN
Qty: 120 ML | Refills: 0 | Status: SHIPPED | OUTPATIENT
Start: 2018-05-01 | End: 2018-06-08

## 2018-05-01 NOTE — TELEPHONE ENCOUNTER
"To Dr. Javan Canela to advise on medication for the cough she states she gets yearly.  She states is prescribed \"a cough syrup\".    Chart doesn't reflect anything since 2012.  You had mentioned yesterday may prescription the tessalon.  Please advise.  Pharmacy pended.   "

## 2018-05-01 NOTE — TELEPHONE ENCOUNTER
Notified patient via [employer] that her medication was sent.    Deirdre Fihsman,

## 2018-05-28 DIAGNOSIS — F41.9 ANXIETY: ICD-10-CM

## 2018-05-30 RX ORDER — CITALOPRAM HYDROBROMIDE 20 MG/1
TABLET ORAL
Qty: 90 TABLET | Refills: 0 | Status: SHIPPED | OUTPATIENT
Start: 2018-05-30 | End: 2018-07-11

## 2018-06-08 ENCOUNTER — OFFICE VISIT (OUTPATIENT)
Dept: ENDOCRINOLOGY | Facility: CLINIC | Age: 63
End: 2018-06-08
Payer: COMMERCIAL

## 2018-06-08 VITALS
HEIGHT: 66 IN | HEART RATE: 74 BPM | SYSTOLIC BLOOD PRESSURE: 161 MMHG | TEMPERATURE: 98.8 F | WEIGHT: 164.2 LBS | DIASTOLIC BLOOD PRESSURE: 87 MMHG | BODY MASS INDEX: 26.39 KG/M2 | OXYGEN SATURATION: 94 %

## 2018-06-08 DIAGNOSIS — E65 ABDOMINAL OBESITY: ICD-10-CM

## 2018-06-08 DIAGNOSIS — E66.3 OVERWEIGHT (BMI 25.0-29.9): Primary | ICD-10-CM

## 2018-06-08 RX ORDER — TOPIRAMATE 25 MG/1
TABLET, FILM COATED ORAL
Qty: 60 TABLET | Refills: 3 | Status: SHIPPED | OUTPATIENT
Start: 2018-06-08 | End: 2018-07-11

## 2018-06-08 RX ORDER — NALTREXONE HYDROCHLORIDE 50 MG/1
TABLET, FILM COATED ORAL
Qty: 30 TABLET | Refills: 3 | Status: SHIPPED | OUTPATIENT
Start: 2018-06-08 | End: 2018-07-11

## 2018-06-08 ASSESSMENT — PAIN SCALES - GENERAL: PAINLEVEL: NO PAIN (0)

## 2018-06-08 NOTE — MR AVS SNAPSHOT
After Visit Summary   6/8/2018    Ludy Ramos    MRN: [id number]           Patient Information     Date Of Birth          1955        Visit Information        Provider Department      6/8/2018 9:40 AM Elvis Boston MD [facility] Medical Weight Management        Care Instructions    Return in about 3 mo    We will start back on the topiramate and will add the naltrexone    Let us know how you are doing between visits and if you have any questions or concerns let us know    1.  MEDICATION STARTED AT THIS APPOINTMENT  We are starting topiramate at bedtime.  Start one tab, 25 mg, for a week. Go up to 50 mg (2 tabs) for the next week. Stay at 2 tablets. Call the nurse at [phone] if you have any questions or concerns. (Do not stop taking it if you don't think it's working. For some people it works even though they do not feel much different.)    Topiramate (Topamax) is a medication that is used most often to treat migraine headaches or for seizures. It has also been found to help with weight loss. Although it's not currently FDA approved for weight loss, it has been used safely for a number of years to help people who are carrying extra weight.     Just how topiramate helps with weight loss has not been exactly determined. However it seems to work on areas of the brain to quiet down signals related to eating.      Topiramate may make you:    >feel less interest in eating in between meals   >think less about food and eating   >find it easier to push the plate away   >find giving up pop easier    >have an easier time eating less    For some of our patients, the pills work right away. They feel and think quite differently about food. Other patients don't feel much of a change but find in fact they have lost weight! Like all weight loss medications, topiramate works best when you help it work.  This means:    1) Have less tempting high calorie (fattening) food around the house or office     2) Have lower calorie food (fruits, vegetables,low fat meats and dairy) for snacks    3) Eat out only one time or less each week.   4) Eat your meals at a table with the TV or computer off.    Side-effects. Topiramate is generally well tolerated. The main side-effects we see are:   Tingling in hands,feet, or face (usually not very troublesome)   Mental confusion and word finding trouble (about 10% of patients have this.)     Feeling sleepy or a bit dopey- this goes away very soon after starting.    One of the dangers of topiramate is the possibility of birth defects--if you get pregnant when you are on it, there is the risk that your baby will be born with a cleft lip or palate.  If you are on topiramate and of child bearing age, you need to be on a reliable form of birth control or refrain from sexual intercourse.     Please refer to the pharmacy insert for more information on side-effects. Since many pharmacists are not familiar with the use of topiramate in weight loss, calling the clinic will get you the most accurate information on the use of this medication for weight loss.     In order to get refills of this or any medication we prescribe you must be seen in the medical weight mgmt clinic every 2-3 months. Please have your pharmacy fax a refill request to [phone].      2.  MEDICATION STARTED AT THIS APPOINTMENT  We are starting Naltrexone. Start with 1/2 tab 1-2 hours prior to the time you have the most trouble with cravings or extra hunger. If you are doing well you may switch to a whole tablet taken at the same time period or take 1/2 tablet twice daily.     WARNING: This medication blocks the action of opioid type pain medications. If you routinely take any medication like Codeine, Oxycontin,Percocet,Morphine,Dilaudid or Methodone, do not take this until you have talked with weight management staff. If you are planning surgery you should stop Naltrexone 4 days prior to the surgery. If you have an  "injury that requires pain medication, make sure the health care staff knows you take Naltrexone.     Call the nurse at [phone] if you have any questions or concerns. (Do not stop taking it if you don't think it's working. For some people it works without them knowing it.)    Naltrexone is a medication that is used most often to help people who are troubled by dependence on prescription pain killers or alcohol. It has also been found to help with weight loss. Although it's not currently FDA approved for weight loss, it has been used safely for a number of years to help people who are carrying extra weight.     Just how Naltrexone helps with weight loss has not been exactly determined.  It seems to work by quieting down brain signals related to strong food cravings. Many of our patients use the word \"addiction\" to describe their feelings and constant thoughts about food. It makes sense then to treat the feeling of dependence on food, outside of real hunger, with a medication designed to help with other sorts of dependence.     Our patients on Naltrexone find that they:    >feel less interest in food   >think less about food and eating and have more time to think of other things   >find it easier to push the plate away   >have an easier time eating less    For some of our patients, these feelings are very immediate. Other patients, don't feel much of a change but find they've lost weight. Like all weight loss medications, Naltrexone works best when you help it work. This means:  1. Having less tempting high calorie (fattening) food around the house or office. (For people with strong cravings this is very important.)   2. Staying away from situations or people that may trigger your cravings .   3. Eating out only one time or less each week.  4. Eating your meals at a table with the TV or computer off.    Side-effects. Naltrexone is generally well tolerated. The main side-effect we see is  nausea or a woozy feeling. " A small number of people feel quite ill. Most people have a mild reaction and some people have no reaction at all.  The good news is that this feeling does go away.     In order to avoid nausea, please start the medication with half a pill for the first few days. Go on to a full pill if you are feeling well.      If you  are nauseated on 1/2 a pill it is okay to cut back to 1/4 pill ( a very small amount). Take this for a couple of days and work your way back up to a 1/2 pill and then a whole pill. Taking the medication at night or with food  to start also may help prevent the feeling of nausea.         Please refer to the pharmacy insert for more information on side-effects. Since many pharmacists are not familiar with the use of naltrexone in weight loss, calling the nurse at [phone] will get you the most accurate information.  In order to get refills of this or any medication we prescribe you must be seen in the medical weight mgmt clinic every 2-3 months. Please have your pharmacy fax a refill request to [phone].                                       Follow-ups after your visit        Who to contact     Please call your clinic at [phone] to:    Ask questions about your health    Make or cancel appointments    Discuss your medicines    Learn about your test results    Speak to your doctor            Additional Information About Your Visit        Storee Information     Storee gives you secure access to your electronic health record. If you see a primary care provider, you can also send messages to your care team and make appointments. If you have questions, please call your primary care clinic.  If you do not have a primary care provider, please call [phone] and they will assist you.      Storee is an electronic gateway that provides easy, online access to your medical records. With Storee, you can request a clinic appointment, read your test results, renew a prescription or communicate  "with your care team.     To access your existing account, please contact your [facility] or call [phone] for assistance.        Care EveryWhere ID     This is your Care EveryWhere ID. This could be used by other organizations to access your [facility] medical records  [id number]        Your Vitals Were     Pulse Temperature Height Pulse Oximetry BMI (Body Mass Index)       74 98.8  F (37.1  C) (Oral) 1.67 m (5' 5.75\") 94% 26.7 kg/m2        Blood Pressure from Last 3 Encounters:   06/08/18 161/87   02/09/18 (!) 156/97   10/16/17 123/73    Weight from Last 3 Encounters:   06/08/18 74.5 kg (164 lb 3.2 oz)   02/09/18 78.4 kg (172 lb 12.8 oz)   10/16/17 79.6 kg (175 lb 6.4 oz)              Today, you had the following     No orders found for display       Primary Care Provider Office Phone # Fax #    Javan Erasmo Canela [phone] [address] [facility] [address]        Equal Access to Services     [facility]: Hadii aad ku hadasho Soomaali, waaxda luqadaha, qaybta kaalmada adeegyada, vasiliy warnern riya warner . So [facility] [phone].    ATENCIÓN: Si habla español, tiene a aden disposición servicios gratuitos de asistencia lingüística. Llame al [phone].    We comply with applicable federal civil rights laws and [place] laws. We do not discriminate on the basis of race, color, national origin, age, disability, sex, sexual orientation, or gender identity.            Thank you!     Thank you for choosing [facility] MEDICAL WEIGHT MANAGEMENT  for your care. Our goal is always to provide you with excellent care. Hearing back from our patients is one way we can continue to improve our services. Please take a few minutes to complete the written survey that you may receive in the mail after your visit with us. Thank you!             Your Updated Medication List - Protect others around you: Learn how to safely use, store and throw away your " medicines at www.disposemymeds.org.          This list is accurate as of 6/8/18 10:22 AM.  Always use your most recent med list.                   Brand Name Dispense Instructions for use Diagnosis    albuterol 108 (90 Base) MCG/ACT Inhaler    PROAIR HFA/PROVENTIL HFA/VENTOLIN HFA    1 Inhaler    Inhale 2 puffs into the lungs every 4 hours as needed for shortness of breath / dyspnea    Moderate persistent asthma, uncomplicated       budesonide-formoterol 160-4.5 MCG/ACT Inhaler    SYMBICORT    3 Inhaler    Inhale 2 puffs into the lungs 2 times daily    Moderate persistent asthma, uncomplicated       citalopram 20 MG tablet    celeXA    90 tablet    TAKE 1 TABLET BY MOUTH ONCE DAILY    Anxiety       losartan-hydrochlorothiazide 50-12.5 MG per tablet    HYZAAR    90 tablet    TAKE ONE TABLET BY MOUTH ONCE DAILY    Essential hypertension       traZODone 100 MG tablet    DESYREL    180 tablet    TAKE TWO TABLETS BY MOUTH AT BEDTIME    Persistent insomnia

## 2018-06-08 NOTE — LETTER
"2018       RE: Ludy Ramos  [address]  [place] [address]     Dear Colleague,    Thank you for referring your patient, Ludy Ramos, to the [facility] MEDICAL WEIGHT MANAGEMENT at [facility]. Please see a copy of my visit note below.          Return Medical Weight Management Note     Ludy Ramos  MRN:  [id number]  :  1955  AMBROSIO:  2018    Dear Javan Canela,    I had the pleasure of seeing your patient Ludy Ramos.  She is a 62 year old female who I am continuing to see for treatment of obesity related to:       2018   I have the following co-morbidities associated with obesity: High Blood Pressure       INTERVAL HISTORY:  Pt was seen for intial visit about 4 mo ago.    Detailed assessment of factors/barriers related to lack of wt loss success reviewed--  A/P as follows--  \"Ludy is a patient with mature onset abd abd obesity (BMI in overwt category with HTN co-morbidity) with significant element of familial/genetic influence and with current health consequences.  Ludy Ramos eats a high carb diet, eats a high fat diet, eats fast food once or more per week, uses food as a reward, uses food as mood management, has perception of intense hunger, mostly eats during the evening-significant amount of liquid calories also     Her problem is complicated by strong craving/reward pathways and can work also on shifting lifestyle choices  Plan included--  Decrease portion sizes  Decrease eating out  Purge house of food triggers  Decrease caloric beverages by reducing ETOH, stopping sugared soda, stopping juice  Dietician visit of education  Volumetrics eating plan  Calorie/low fat diet\"     She also started topiramate.    CURRENT WEIGHT:   164 lbs 3.2 oz    Wt Readings from Last 4 Encounters:   18 74.5 kg (164 lb 3.2 oz)   18 78.4 kg (172 lb 12.8 oz)   10/16/17 79.6 kg (175 lb 6.4 oz)   10/02/17 77.3 kg (170 lb 6.4 oz) " "      Height:  5' 5.75\"  Body Mass Index:  Body mass index is 26.7 kg/(m^2).  Vitals:  B/P: 161/87, P: 74, R: Data Unavailable   /87 (BP Location: Left arm, Patient Position: Chair, Cuff Size: Adult Regular)  Pulse 74  Temp 98.8  F (37.1  C) (Oral)  Ht 1.67 m (5' 5.75\")  Wt 74.5 kg (164 lb 3.2 oz)  SpO2 94%  BMI 26.7 kg/m2    Initial consult weight was 172.75# on 2/9/18  Weight change since last seen on 4/30/2018 is down about 8.5 pounds.   Total loss is about 8.5 pounds.    Diet and Activity Changes Since Last Visit Reviewed With Patient 6/8/2018   I have made the following changes to my diet since my last visit: none   With regards to my diet, I am still struggling with: all   I have made the following changes to my activity/exercise since my last visit: s0me   With regards to my activity/exercise, I am still struggling with: all       MEDICATIONS:   Current Outpatient Prescriptions   Medication     albuterol (PROAIR HFA/PROVENTIL HFA/VENTOLIN HFA) 108 (90 BASE) MCG/ACT Inhaler     budesonide-formoterol (SYMBICORT) 160-4.5 MCG/ACT Inhaler     citalopram (CELEXA) 20 MG tablet     losartan-hydrochlorothiazide (HYZAAR) 50-12.5 MG per tablet     traZODone (DESYREL) 100 MG tablet     No current facility-administered medications for this visit.        Weight Loss Medication History Reviewed With Patient 6/8/2018   Which weight loss medications are you currently taking on a regular basis?  Topamax (topiramate)   If you are not taking a weight loss medication that was prescribed to you, please indicate why: I did not like the side effects   Are you having any side effects from the weight loss medication that we have prescribed you? Yes   If you are having side effects please describe: numb hands arm       ASSESSMENT:   Overwt/abd obesity and has co-morbidities (HTN)    PLAN:   Return in about 3 mo    We will start back on the topiramate and will add the naltrexone    She will let us know how she is doing " between visits and if she has any questions or concerns let us know    1.  MEDICATION STARTED AT THIS APPOINTMENT  We are starting topiramate at bedtime.  Start one tab, 25 mg, for a week. Go up to 50 mg (2 tabs) for the next week. Stay at 2 tablets.     2.  MEDICATION STARTED AT THIS APPOINTMENT  We are starting Naltrexone. Start with 1/2 tab 1-2 hours prior to the time you have the most trouble with cravings or extra hunger. If you are doing well you may switch to a whole tablet taken at the same time period or take 1/2 tablet twice daily.     Time: 20/20 min spent on evaluation, management, counseling, education, & motivational interviewing with greater than 50 % of the total time was spent on counseling and coordinating care    Sincerely,    Elvis Boston MD

## 2018-06-08 NOTE — PATIENT INSTRUCTIONS
Return in about 3 mo    We will start back on the topiramate and will add the naltrexone    Let us know how you are doing between visits and if you have any questions or concerns let us know    1.  MEDICATION STARTED AT THIS APPOINTMENT  We are starting topiramate at bedtime.  Start one tab, 25 mg, for a week. Go up to 50 mg (2 tabs) for the next week. Stay at 2 tablets. Call the nurse at [phone] if you have any questions or concerns. (Do not stop taking it if you don't think it's working. For some people it works even though they do not feel much different.)    Topiramate (Topamax) is a medication that is used most often to treat migraine headaches or for seizures. It has also been found to help with weight loss. Although it's not currently FDA approved for weight loss, it has been used safely for a number of years to help people who are carrying extra weight.     Just how topiramate helps with weight loss has not been exactly determined. However it seems to work on areas of the brain to quiet down signals related to eating.      Topiramate may make you:    >feel less interest in eating in between meals   >think less about food and eating   >find it easier to push the plate away   >find giving up pop easier    >have an easier time eating less    For some of our patients, the pills work right away. They feel and think quite differently about food. Other patients don't feel much of a change but find in fact they have lost weight! Like all weight loss medications, topiramate works best when you help it work.  This means:    1) Have less tempting high calorie (fattening) food around the house or office    2) Have lower calorie food (fruits, vegetables,low fat meats and dairy) for snacks    3) Eat out only one time or less each week.   4) Eat your meals at a table with the TV or computer off.    Side-effects. Topiramate is generally well tolerated. The main side-effects we see are:   Tingling in hands,feet, or face  (usually not very troublesome)   Mental confusion and word finding trouble (about 10% of patients have this.)     Feeling sleepy or a bit dopey- this goes away very soon after starting.    One of the dangers of topiramate is the possibility of birth defects--if you get pregnant when you are on it, there is the risk that your baby will be born with a cleft lip or palate.  If you are on topiramate and of child bearing age, you need to be on a reliable form of birth control or refrain from sexual intercourse.     Please refer to the pharmacy insert for more information on side-effects. Since many pharmacists are not familiar with the use of topiramate in weight loss, calling the clinic will get you the most accurate information on the use of this medication for weight loss.     In order to get refills of this or any medication we prescribe you must be seen in the medical weight mgmt clinic every 2-3 months. Please have your pharmacy fax a refill request to [phone].      2.  MEDICATION STARTED AT THIS APPOINTMENT  We are starting Naltrexone. Start with 1/2 tab 1-2 hours prior to the time you have the most trouble with cravings or extra hunger. If you are doing well you may switch to a whole tablet taken at the same time period or take 1/2 tablet twice daily.     WARNING: This medication blocks the action of opioid type pain medications. If you routinely take any medication like Codeine, Oxycontin,Percocet,Morphine,Dilaudid or Methodone, do not take this until you have talked with weight management staff. If you are planning surgery you should stop Naltrexone 4 days prior to the surgery. If you have an injury that requires pain medication, make sure the health care staff knows you take Naltrexone.     Call the nurse at [phone] if you have any questions or concerns. (Do not stop taking it if you don't think it's working. For some people it works without them knowing it.)    Naltrexone is a medication that is  "used most often to help people who are troubled by dependence on prescription pain killers or alcohol. It has also been found to help with weight loss. Although it's not currently FDA approved for weight loss, it has been used safely for a number of years to help people who are carrying extra weight.     Just how Naltrexone helps with weight loss has not been exactly determined.  It seems to work by quieting down brain signals related to strong food cravings. Many of our patients use the word \"addiction\" to describe their feelings and constant thoughts about food. It makes sense then to treat the feeling of dependence on food, outside of real hunger, with a medication designed to help with other sorts of dependence.     Our patients on Naltrexone find that they:    >feel less interest in food   >think less about food and eating and have more time to think of other things   >find it easier to push the plate away   >have an easier time eating less    For some of our patients, these feelings are very immediate. Other patients, don't feel much of a change but find they've lost weight. Like all weight loss medications, Naltrexone works best when you help it work. This means:  1. Having less tempting high calorie (fattening) food around the house or office. (For people with strong cravings this is very important.)   2. Staying away from situations or people that may trigger your cravings .   3. Eating out only one time or less each week.  4. Eating your meals at a table with the TV or computer off.    Side-effects. Naltrexone is generally well tolerated. The main side-effect we see is  nausea or a woozy feeling. A small number of people feel quite ill. Most people have a mild reaction and some people have no reaction at all.  The good news is that this feeling does go away.     In order to avoid nausea, please start the medication with half a pill for the first few days. Go on to a full pill if you are feeling well.  "     If you  are nauseated on 1/2 a pill it is okay to cut back to 1/4 pill ( a very small amount). Take this for a couple of days and work your way back up to a 1/2 pill and then a whole pill. Taking the medication at night or with food  to start also may help prevent the feeling of nausea.         Please refer to the pharmacy insert for more information on side-effects. Since many pharmacists are not familiar with the use of naltrexone in weight loss, calling the nurse at [phone] will get you the most accurate information.  In order to get refills of this or any medication we prescribe you must be seen in the medical weight mgmt clinic every 2-3 months. Please have your pharmacy fax a refill request to [phone].

## 2018-06-08 NOTE — PROGRESS NOTES
"      Return Medical Weight Management Note     Ludy Ramos  MRN:  [id number]  :  1955  AMBROSIO:  2018    Dear Hilton, Javan Zimmerman,    I had the pleasure of seeing your patient Ludy Ramos.  She is a 62 year old female who I am continuing to see for treatment of obesity related to:       2018   I have the following co-morbidities associated with obesity: High Blood Pressure       INTERVAL HISTORY:  Pt was seen for intial visit about 4 mo ago.    Detailed assessment of factors/barriers related to lack of wt loss success reviewed--  A/P as follows--  \"Ludy is a patient with mature onset abd abd obesity (BMI in overwt category with HTN co-morbidity) with significant element of familial/genetic influence and with current health consequences.  Ludy Ramos eats a high carb diet, eats a high fat diet, eats fast food once or more per week, uses food as a reward, uses food as mood management, has perception of intense hunger, mostly eats during the evening-significant amount of liquid calories also     Her problem is complicated by strong craving/reward pathways and can work also on shifting lifestyle choices  Plan included--  Decrease portion sizes  Decrease eating out  Purge house of food triggers  Decrease caloric beverages by reducing ETOH, stopping sugared soda, stopping juice  Dietician visit of education  Volumetrics eating plan  Calorie/low fat diet\"     She also started topiramate.    CURRENT WEIGHT:   164 lbs 3.2 oz    Wt Readings from Last 4 Encounters:   18 74.5 kg (164 lb 3.2 oz)   18 78.4 kg (172 lb 12.8 oz)   10/16/17 79.6 kg (175 lb 6.4 oz)   10/02/17 77.3 kg (170 lb 6.4 oz)       Height:  5' 5.\"  Body Mass Index:  Body mass index is 26.7 kg/(m^2).  Vitals:  B/P: 161/87, P: 74, R: Data Unavailable   /87 (BP Location: Left arm, Patient Position: Chair, Cuff Size: Adult Regular)  Pulse 74  Temp 98.8  F (37.1  C) (Oral)  Ht 1.67 m (5\")  Wt 74.5 kg (164 " lb 3.2 oz)  SpO2 94%  BMI 26.7 kg/m2    Initial consult weight was 172.75# on 2/9/18  Weight change since last seen on 4/30/2018 is down about 8.5 pounds.   Total loss is about 8.5 pounds.    Diet and Activity Changes Since Last Visit Reviewed With Patient 6/8/2018   I have made the following changes to my diet since my last visit: none   With regards to my diet, I am still struggling with: all   I have made the following changes to my activity/exercise since my last visit: s0me   With regards to my activity/exercise, I am still struggling with: all       MEDICATIONS:   Current Outpatient Prescriptions   Medication     albuterol (PROAIR HFA/PROVENTIL HFA/VENTOLIN HFA) 108 (90 BASE) MCG/ACT Inhaler     budesonide-formoterol (SYMBICORT) 160-4.5 MCG/ACT Inhaler     citalopram (CELEXA) 20 MG tablet     losartan-hydrochlorothiazide (HYZAAR) 50-12.5 MG per tablet     traZODone (DESYREL) 100 MG tablet     No current facility-administered medications for this visit.        Weight Loss Medication History Reviewed With Patient 6/8/2018   Which weight loss medications are you currently taking on a regular basis?  Topamax (topiramate)   If you are not taking a weight loss medication that was prescribed to you, please indicate why: I did not like the side effects   Are you having any side effects from the weight loss medication that we have prescribed you? Yes   If you are having side effects please describe: numb hands arm       ASSESSMENT:   Overwt/abd obesity and has co-morbidities (HTN)    PLAN:   Return in about 3 mo    We will start back on the topiramate and will add the naltrexone    She will let us know how she is doing between visits and if she has any questions or concerns let us know    1.  MEDICATION STARTED AT THIS APPOINTMENT  We are starting topiramate at bedtime.  Start one tab, 25 mg, for a week. Go up to 50 mg (2 tabs) for the next week. Stay at 2 tablets.     2.  MEDICATION STARTED AT THIS APPOINTMENT  We  are starting Naltrexone. Start with 1/2 tab 1-2 hours prior to the time you have the most trouble with cravings or extra hunger. If you are doing well you may switch to a whole tablet taken at the same time period or take 1/2 tablet twice daily.     Time: 20/20 min spent on evaluation, management, counseling, education, & motivational interviewing with greater than 50 % of the total time was spent on counseling and coordinating care    Sincerely,    Elvis Boston MD

## 2018-06-19 ENCOUNTER — TELEPHONE (OUTPATIENT)
Dept: FAMILY MEDICINE | Facility: CLINIC | Age: 63
End: 2018-06-19

## 2018-06-19 NOTE — TELEPHONE ENCOUNTER
Panel Management Review      Patient has the following on her problem list:     Hypertension   Last three blood pressure readings:  BP Readings from Last 3 Encounters:   06/08/18 161/87   02/09/18 (!) 156/97   10/16/17 123/73     Blood pressure: FAILED    HTN Guidelines:  Age 18-59 BP range:  Less than 140/90  Age 60-85 with Diabetes:  Less than 140/90  Age 60-85 without Diabetes:  less than 150/90      Composite cancer screening  Chart review shows that this patient is due/due soon for the following Mammogram  Summary:    Patient is due/failing the following:   ACT and MAMMOGRAM    Action needed:   Patient needs office visit for mammo and ACT.    Type of outreach:    Sent letter.    Questions for provider review:    None                                                                                                                                    Emily Dey M.A.       Chart routed to n/a .

## 2018-06-19 NOTE — LETTER
[facility]  [address] [facility] [address]  Phone: [phone]          Ludy Ramos  [address]  [facility] [address]          June 19, 2018          DearLudy Ramos      Our records indicate that you have not scheduled for a(n)Asthma check , Blood pressure check  and Mammogram which was recommended by your health care team. Monitoring and managing your preventative and chronic health conditions are very important to us.       If you have received your health care elsewhere, please provide us with that information so it can be documented in your chart.    Please call [phone] or message us through your [email or web address] account to schedule an appointment or provide information for your chart.     We look forward to seeing you and working with you on your health care needs.     Sincerely,   Javan Canela MD          *If you have already scheduled an appointment, please disregard this reminder  \

## 2018-07-11 ENCOUNTER — OFFICE VISIT (OUTPATIENT)
Dept: FAMILY MEDICINE | Facility: CLINIC | Age: 63
End: 2018-07-11
Payer: COMMERCIAL

## 2018-07-11 VITALS
RESPIRATION RATE: 18 BRPM | WEIGHT: 165 LBS | BODY MASS INDEX: 26.83 KG/M2 | DIASTOLIC BLOOD PRESSURE: 83 MMHG | OXYGEN SATURATION: 95 % | TEMPERATURE: 97.9 F | HEART RATE: 92 BPM | SYSTOLIC BLOOD PRESSURE: 134 MMHG

## 2018-07-11 DIAGNOSIS — I10 ESSENTIAL HYPERTENSION: ICD-10-CM

## 2018-07-11 DIAGNOSIS — G47.00 PERSISTENT INSOMNIA: ICD-10-CM

## 2018-07-11 DIAGNOSIS — E65 ABDOMINAL OBESITY: ICD-10-CM

## 2018-07-11 DIAGNOSIS — J45.40 MODERATE PERSISTENT ASTHMA, UNCOMPLICATED: ICD-10-CM

## 2018-07-11 DIAGNOSIS — Z11.4 SCREENING FOR HIV (HUMAN IMMUNODEFICIENCY VIRUS): ICD-10-CM

## 2018-07-11 DIAGNOSIS — F41.9 ANXIETY: ICD-10-CM

## 2018-07-11 DIAGNOSIS — Z12.31 VISIT FOR SCREENING MAMMOGRAM: ICD-10-CM

## 2018-07-11 PROCEDURE — 99214 OFFICE O/P EST MOD 30 MIN: CPT | Performed by: FAMILY MEDICINE

## 2018-07-11 RX ORDER — TOPIRAMATE 25 MG/1
TABLET, FILM COATED ORAL
Qty: 60 TABLET | Refills: 3 | Status: SHIPPED | OUTPATIENT
Start: 2018-07-11 | End: 2019-05-15

## 2018-07-11 RX ORDER — CITALOPRAM HYDROBROMIDE 20 MG/1
20 TABLET ORAL DAILY
Qty: 90 TABLET | Refills: 1 | Status: SHIPPED | OUTPATIENT
Start: 2018-07-11 | End: 2019-03-04

## 2018-07-11 RX ORDER — BUDESONIDE AND FORMOTEROL FUMARATE DIHYDRATE 160; 4.5 UG/1; UG/1
2 AEROSOL RESPIRATORY (INHALATION) 2 TIMES DAILY
Qty: 3 INHALER | Refills: 3 | Status: SHIPPED | OUTPATIENT
Start: 2018-07-11 | End: 2019-05-15

## 2018-07-11 RX ORDER — LOSARTAN POTASSIUM AND HYDROCHLOROTHIAZIDE 12.5; 5 MG/1; MG/1
1 TABLET ORAL DAILY
Qty: 90 TABLET | Refills: 1 | Status: SHIPPED | OUTPATIENT
Start: 2018-07-11 | End: 2019-01-31

## 2018-07-11 RX ORDER — ALBUTEROL SULFATE 90 UG/1
2 AEROSOL, METERED RESPIRATORY (INHALATION) EVERY 4 HOURS PRN
Qty: 1 INHALER | Refills: 3 | Status: SHIPPED | OUTPATIENT
Start: 2018-07-11 | End: 2019-05-15

## 2018-07-11 RX ORDER — TRAZODONE HYDROCHLORIDE 100 MG/1
200 TABLET ORAL AT BEDTIME
Qty: 180 TABLET | Refills: 1 | Status: SHIPPED | OUTPATIENT
Start: 2018-07-11 | End: 2019-03-04

## 2018-07-11 ASSESSMENT — PAIN SCALES - GENERAL: PAINLEVEL: NO PAIN (0)

## 2018-07-11 NOTE — NURSING NOTE
"Chief Complaint   Patient presents with     Recheck Medication     act       Initial /83  Pulse 92  Temp 97.9  F (36.6  C) (Oral)  Resp 18  Wt 165 lb (74.8 kg)  SpO2 95%  BMI 26.83 kg/m2 Estimated body mass index is 26.83 kg/(m^2) as calculated from the following:    Height as of 6/8/18: 5' 5.75\" (1.67 m).    Weight as of this encounter: 165 lb (74.8 kg).  Medication Reconciliation: complete  Emily Dey M.A.    "

## 2018-07-11 NOTE — MR AVS SNAPSHOT
After Visit Summary   7/11/2018    Ludy Ramos    MRN: [id number]           Patient Information     Date Of Birth          1955        Visit Information        Provider Department      7/11/2018 4:15 PM Javan Canela MD [facility]        Today's Diagnoses     Visit for screening mammogram        Screening for HIV (human immunodeficiency virus)        Moderate persistent asthma, uncomplicated        Anxiety        Essential hypertension        Abdominal obesity        Persistent insomnia           Follow-ups after your visit        Your next 10 appointments already scheduled     Sep 21, 2018 10:40 AM CDT   (Arrive by 10:25 AM)   Return Visit with Elvis Boston MD   [facility] Medical Weight Management ([facility] [place])    [address]  4th [address]  [place] [address] [phone]              Future tests that were ordered for you today     Open Future Orders        Priority Expected Expires Ordered    MA SCREENING DIGITAL BILAT - Future  (s+30) Routine  7/10/2019 7/11/2018            Who to contact     If you have questions or need follow up information about today's clinic visit or your schedule please contact [facility] directly at [phone].  Normal or non-critical lab and imaging results will be communicated to you by MyChart, letter or phone within 4 business days after the clinic has received the results. If you do not hear from us within 7 days, please contact the clinic through [employer] or phone. If you have a critical or abnormal lab result, we will notify you by phone as soon as possible.  Submit refill requests through [employer] or call your pharmacy and they will forward the refill request to us. Please allow 3 business days for your refill to be completed.          Additional Information About Your Visit        [employer] Information     [employer] gives you secure access to your electronic health record.  If you see a primary care provider, you can also send messages to your care team and make appointments. If you have questions, please call your primary care clinic.  If you do not have a primary care provider, please call [phone] and they will assist you.        Care EveryWhere ID     This is your Care EveryWhere ID. This could be used by other organizations to access your [place] medical records  [id number]        Your Vitals Were     Pulse Temperature Respirations Pulse Oximetry BMI (Body Mass Index)       92 97.9  F (36.6  C) (Oral) 18 95% 26.83 kg/m2        Blood Pressure from Last 3 Encounters:   07/11/18 134/83   06/08/18 161/87   02/09/18 (!) 156/97    Weight from Last 3 Encounters:   07/11/18 165 lb (74.8 kg)   06/08/18 164 lb 3.2 oz (74.5 kg)   02/09/18 172 lb 12.8 oz (78.4 kg)                 Today's Medication Changes          These changes are accurate as of 7/11/18  4:52 PM.  If you have any questions, ask your nurse or doctor.               These medicines have changed or have updated prescriptions.        Dose/Directions    citalopram 20 MG tablet   Commonly known as:  celeXA   This may have changed:  See the new instructions.   Used for:  Anxiety, Visit for screening mammogram, Screening for HIV (human immunodeficiency virus), Moderate persistent asthma, uncomplicated, Essential hypertension, Abdominal obesity, Persistent insomnia   Changed by:  Javan Canela MD        Dose:  20 mg   Take 1 tablet (20 mg) by mouth daily   Quantity:  90 tablet   Refills:  1       losartan-hydrochlorothiazide 50-12.5 MG per tablet   Commonly known as:  HYZAAR   This may have changed:  See the new instructions.   Used for:  Essential hypertension, Visit for screening mammogram, Screening for HIV (human immunodeficiency virus), Moderate persistent asthma, uncomplicated, Anxiety, Abdominal obesity, Persistent insomnia   Changed by:  Javan Canela MD        Dose:  1 tablet   Take 1 tablet by mouth  daily   Quantity:  90 tablet   Refills:  1            Where to get your medicines      These medications were sent to [id number] [place] [facility], [place] - [address] [address]  [address] [facility] [place] [address]     Phone:  [phone]     albuterol 108 (90 Base) MCG/ACT Inhaler    budesonide-formoterol 160-4.5 MCG/ACT Inhaler    citalopram 20 MG tablet    losartan-hydrochlorothiazide 50-12.5 MG per tablet    topiramate 25 MG tablet    traZODone 100 MG tablet                Primary Care Provider Office Phone # Fax #    Javan Erasmo Canela [phone] [address] [facility] [address]        Equal Access to Services     BERNY HOGAN : Hadii aad ku hadasho Soomaali, waaxda luqadaha, qaybta kaalmada adeegyada, waxay idiin hayjuana wells. So [facility] [phone].    ATENCIÓN: Si habla español, tiene a aden disposición servicios gratuitos de asistencia lingüística. [facility] [phone].    We comply with applicable federal civil rights laws and [place] laws. We do not discriminate on the basis of race, color, national origin, age, disability, sex, sexual orientation, or gender identity.            Thank you!     Thank you for choosing [facility]  for your care. Our goal is always to provide you with excellent care. Hearing back from our patients is one way we can continue to improve our services. Please take a few minutes to complete the written survey that you may receive in the mail after your visit with us. Thank you!             Your Updated Medication List - Protect others around you: Learn how to safely use, store and throw away your medicines at www.disposemymeds.org.          This list is accurate as of 7/11/18  4:52 PM.  Always use your most recent med list.                   Brand Name Dispense Instructions for use Diagnosis    albuterol 108 (90 Base) MCG/ACT Inhaler    PROAIR HFA/PROVENTIL HFA/VENTOLIN HFA    1 Inhaler    Inhale 2 puffs  into the lungs every 4 hours as needed for shortness of breath / dyspnea    Moderate persistent asthma, uncomplicated, Visit for screening mammogram, Screening for HIV (human immunodeficiency virus), Anxiety, Essential hypertension, Abdominal obesity, Persistent insomnia       budesonide-formoterol 160-4.5 MCG/ACT Inhaler    SYMBICORT    3 Inhaler    Inhale 2 puffs into the lungs 2 times daily    Moderate persistent asthma, uncomplicated, Visit for screening mammogram, Screening for HIV (human immunodeficiency virus), Anxiety, Essential hypertension, Abdominal obesity, Persistent insomnia       citalopram 20 MG tablet    celeXA    90 tablet    Take 1 tablet (20 mg) by mouth daily    Anxiety, Visit for screening mammogram, Screening for HIV (human immunodeficiency virus), Moderate persistent asthma, uncomplicated, Essential hypertension, Abdominal obesity, Persistent insomnia       losartan-hydrochlorothiazide 50-12.5 MG per tablet    HYZAAR    90 tablet    Take 1 tablet by mouth daily    Essential hypertension, Visit for screening mammogram, Screening for HIV (human immunodeficiency virus), Moderate persistent asthma, uncomplicated, Anxiety, Abdominal obesity, Persistent insomnia       topiramate 25 MG tablet    TOPAMAX    60 tablet    25 mg for 1 week, then 50 mg (2 tablets) daily; take as single dose at supper or can take 25 mg twice daily, as tolerated    Abdominal obesity, Visit for screening mammogram, Screening for HIV (human immunodeficiency virus), Moderate persistent asthma, uncomplicated, Anxiety, Essential hypertension, Persistent insomnia       traZODone 100 MG tablet    DESYREL    180 tablet    Take 2 tablets (200 mg) by mouth At Bedtime APPT NEEDED FOR FURTHER REFILLS    Persistent insomnia, Visit for screening mammogram, Screening for HIV (human immunodeficiency virus), Moderate persistent asthma, uncomplicated, Anxiety, Essential hypertension, Abdominal obesity

## 2018-07-11 NOTE — PROGRESS NOTES
SUBJECTIVE:  SUBJECTIVE:  62 year old.The patient has a history of  follow-up of depressive symptoms.    Since last visit the patient has been doing well.  Notes from that visit reviewed. PHQ-9 has been reviewed.  Current symptoms include: Depressed Mood   Symptoms that have subjectively improved include: Depressed Mood  Previous and current treatment modalities employed include: Medications   Celexa (Citalopram)  Patient Active Problem List   Diagnosis     Insomnia     CARDIOVASCULAR SCREENING; LDL GOAL LESS THAN 130     Hypertension goal BP (blood pressure) < 140/90     Anxiety     Severe persistent asthma without complication     History of colonic polyps     Clavicle fracture     Closed displaced fracture of proximal phalanx of left great toe     Chest wall pain     Thoracic back pain     Advanced directives, counseling/discussion     Angioedema, initial encounter      Reviewed health maintenance  OBJECTIVE:  no apparent distress  /83  Pulse 92  Temp 97.9  F (36.6  C) (Oral)  Resp 18  Wt 165 lb (74.8 kg)  SpO2 95%  BMI 26.83 kg/m2       MENTAL STATUS EXAM:   1. Clinical observations:Patient was clean and was adequately groomed. Patient's emotional presentation was cooperative and anuel/open. Patient spoke clearly and articulately. Patient maintained adequate eye contact and was cooperative in answering questions.  2. Patient appeared to be well-oriented in all spheres with coherent, logical, goal directed and relevent thinking.  3. Thought content: Denies auditory and visual hallucinations or delusions.  4. Affect and mood: Affect is alert and her emotional attitude is described as normal.  5. Sensorium and cognition: Patient was in contact with reality and oriented to place, time, person and situation. patient demonstrated no impairment in immediate, recent, or remote memory. patient's insight was adequate without obvious deficits in intelligence.    ICD-10-CM    1. Visit for screening mammogram  Z12.31 MA SCREENING DIGITAL BILAT - Future  (s+30)     albuterol (PROAIR HFA/PROVENTIL HFA/VENTOLIN HFA) 108 (90 Base) MCG/ACT Inhaler     budesonide-formoterol (SYMBICORT) 160-4.5 MCG/ACT Inhaler     citalopram (CELEXA) 20 MG tablet     losartan-hydrochlorothiazide (HYZAAR) 50-12.5 MG per tablet     topiramate (TOPAMAX) 25 MG tablet     traZODone (DESYREL) 100 MG tablet   2. Screening for HIV (human immunodeficiency virus) Z11.4 MA SCREENING DIGITAL BILAT - Future  (s+30)     albuterol (PROAIR HFA/PROVENTIL HFA/VENTOLIN HFA) 108 (90 Base) MCG/ACT Inhaler     budesonide-formoterol (SYMBICORT) 160-4.5 MCG/ACT Inhaler     citalopram (CELEXA) 20 MG tablet     losartan-hydrochlorothiazide (HYZAAR) 50-12.5 MG per tablet     topiramate (TOPAMAX) 25 MG tablet     traZODone (DESYREL) 100 MG tablet   3. Moderate persistent asthma, uncomplicated J45.40 MA SCREENING DIGITAL BILAT - Future  (s+30)     albuterol (PROAIR HFA/PROVENTIL HFA/VENTOLIN HFA) 108 (90 Base) MCG/ACT Inhaler     budesonide-formoterol (SYMBICORT) 160-4.5 MCG/ACT Inhaler     citalopram (CELEXA) 20 MG tablet     losartan-hydrochlorothiazide (HYZAAR) 50-12.5 MG per tablet     topiramate (TOPAMAX) 25 MG tablet     traZODone (DESYREL) 100 MG tablet   4. Anxiety F41.9 MA SCREENING DIGITAL BILAT - Future  (s+30)     albuterol (PROAIR HFA/PROVENTIL HFA/VENTOLIN HFA) 108 (90 Base) MCG/ACT Inhaler     budesonide-formoterol (SYMBICORT) 160-4.5 MCG/ACT Inhaler     citalopram (CELEXA) 20 MG tablet     losartan-hydrochlorothiazide (HYZAAR) 50-12.5 MG per tablet     topiramate (TOPAMAX) 25 MG tablet     traZODone (DESYREL) 100 MG tablet   5. Essential hypertension I10 MA SCREENING DIGITAL BILAT - Future  (s+30)     albuterol (PROAIR HFA/PROVENTIL HFA/VENTOLIN HFA) 108 (90 Base) MCG/ACT Inhaler     budesonide-formoterol (SYMBICORT) 160-4.5 MCG/ACT Inhaler     citalopram (CELEXA) 20 MG tablet     losartan-hydrochlorothiazide (HYZAAR) 50-12.5 MG per tablet     topiramate  (TOPAMAX) 25 MG tablet     traZODone (DESYREL) 100 MG tablet   6. Abdominal obesity E65 MA SCREENING DIGITAL BILAT - Future  (s+30)     albuterol (PROAIR HFA/PROVENTIL HFA/VENTOLIN HFA) 108 (90 Base) MCG/ACT Inhaler     budesonide-formoterol (SYMBICORT) 160-4.5 MCG/ACT Inhaler     citalopram (CELEXA) 20 MG tablet     losartan-hydrochlorothiazide (HYZAAR) 50-12.5 MG per tablet     topiramate (TOPAMAX) 25 MG tablet     traZODone (DESYREL) 100 MG tablet   7. Persistent insomnia G47.00 MA SCREENING DIGITAL BILAT - Future  (s+30)     albuterol (PROAIR HFA/PROVENTIL HFA/VENTOLIN HFA) 108 (90 Base) MCG/ACT Inhaler     budesonide-formoterol (SYMBICORT) 160-4.5 MCG/ACT Inhaler     citalopram (CELEXA) 20 MG tablet     losartan-hydrochlorothiazide (HYZAAR) 50-12.5 MG per tablet     topiramate (TOPAMAX) 25 MG tablet     traZODone (DESYREL) 100 MG tablet    PLAN:Follow up in 6 months   with e visit    SUBJECTIVE:  62 year old.The patient has a history of asthma.  This started years ago.  Associated symptoms are wheezing.  .  Better with inhalers. ROS no chest pain no fever or chills       Reviewed health maintenance  Patient Active Problem List   Diagnosis     Insomnia     CARDIOVASCULAR SCREENING; LDL GOAL LESS THAN 130     Hypertension goal BP (blood pressure) < 140/90     Anxiety     Severe persistent asthma without complication     History of colonic polyps     Clavicle fracture     Closed displaced fracture of proximal phalanx of left great toe     Chest wall pain     Thoracic back pain     Advanced directives, counseling/discussion     Angioedema, initial encounter     Past Medical History:   Diagnosis Date     Generalized anxiety disorder      Hypertension goal BP (blood pressure) < 140/90      Insomnia      Intermittent asthma      Menopausal state      Osteopenia        OBJECTIVE:  no apparent distress  /83  Pulse 92  Temp 97.9  F (36.6  C) (Oral)  Resp 18  Wt 165 lb (74.8 kg)  SpO2 95%  BMI 26.83  kg/m2      LUNGS:  CTA B/L, no wheezing or crackles.   Cardiovascular: negative, PMI normal. No lifts, heaves, or thrills. RRR. No murmurs, clicks gallops or rub   Gastrointestinal: Abdomen soft, non-tender. BS normal. No masses, organomegaly       ICD-10-CM    1. Visit for screening mammogram Z12.31 MA SCREENING DIGITAL BILAT - Future  (s+30)     albuterol (PROAIR HFA/PROVENTIL HFA/VENTOLIN HFA) 108 (90 Base) MCG/ACT Inhaler     budesonide-formoterol (SYMBICORT) 160-4.5 MCG/ACT Inhaler     citalopram (CELEXA) 20 MG tablet     losartan-hydrochlorothiazide (HYZAAR) 50-12.5 MG per tablet     topiramate (TOPAMAX) 25 MG tablet     traZODone (DESYREL) 100 MG tablet   2. Screening for HIV (human immunodeficiency virus) Z11.4 MA SCREENING DIGITAL BILAT - Future  (s+30)     albuterol (PROAIR HFA/PROVENTIL HFA/VENTOLIN HFA) 108 (90 Base) MCG/ACT Inhaler     budesonide-formoterol (SYMBICORT) 160-4.5 MCG/ACT Inhaler     citalopram (CELEXA) 20 MG tablet     losartan-hydrochlorothiazide (HYZAAR) 50-12.5 MG per tablet     topiramate (TOPAMAX) 25 MG tablet     traZODone (DESYREL) 100 MG tablet   3. Moderate persistent asthma, uncomplicated J45.40 MA SCREENING DIGITAL BILAT - Future  (s+30)     albuterol (PROAIR HFA/PROVENTIL HFA/VENTOLIN HFA) 108 (90 Base) MCG/ACT Inhaler     budesonide-formoterol (SYMBICORT) 160-4.5 MCG/ACT Inhaler     citalopram (CELEXA) 20 MG tablet     losartan-hydrochlorothiazide (HYZAAR) 50-12.5 MG per tablet     topiramate (TOPAMAX) 25 MG tablet     traZODone (DESYREL) 100 MG tablet   4. Anxiety F41.9 MA SCREENING DIGITAL BILAT - Future  (s+30)     albuterol (PROAIR HFA/PROVENTIL HFA/VENTOLIN HFA) 108 (90 Base) MCG/ACT Inhaler     budesonide-formoterol (SYMBICORT) 160-4.5 MCG/ACT Inhaler     citalopram (CELEXA) 20 MG tablet     losartan-hydrochlorothiazide (HYZAAR) 50-12.5 MG per tablet     topiramate (TOPAMAX) 25 MG tablet     traZODone (DESYREL) 100 MG tablet   5. Essential hypertension I10 MA SCREENING  DIGITAL BILAT - Future  (s+30)     albuterol (PROAIR HFA/PROVENTIL HFA/VENTOLIN HFA) 108 (90 Base) MCG/ACT Inhaler     budesonide-formoterol (SYMBICORT) 160-4.5 MCG/ACT Inhaler     citalopram (CELEXA) 20 MG tablet     losartan-hydrochlorothiazide (HYZAAR) 50-12.5 MG per tablet     topiramate (TOPAMAX) 25 MG tablet     traZODone (DESYREL) 100 MG tablet   6. Abdominal obesity E65 MA SCREENING DIGITAL BILAT - Future  (s+30)     albuterol (PROAIR HFA/PROVENTIL HFA/VENTOLIN HFA) 108 (90 Base) MCG/ACT Inhaler     budesonide-formoterol (SYMBICORT) 160-4.5 MCG/ACT Inhaler     citalopram (CELEXA) 20 MG tablet     losartan-hydrochlorothiazide (HYZAAR) 50-12.5 MG per tablet     topiramate (TOPAMAX) 25 MG tablet     traZODone (DESYREL) 100 MG tablet   7. Persistent insomnia G47.00 MA SCREENING DIGITAL BILAT - Future  (s+30)     albuterol (PROAIR HFA/PROVENTIL HFA/VENTOLIN HFA) 108 (90 Base) MCG/ACT Inhaler     budesonide-formoterol (SYMBICORT) 160-4.5 MCG/ACT Inhaler     citalopram (CELEXA) 20 MG tablet     losartan-hydrochlorothiazide (HYZAAR) 50-12.5 MG per tablet     topiramate (TOPAMAX) 25 MG tablet     traZODone (DESYREL) 100 MG tablet    PLAN: Follow up in 1 year           :

## 2018-07-12 ASSESSMENT — ASTHMA QUESTIONNAIRES: ACT_TOTALSCORE: 24

## 2018-07-19 ENCOUNTER — RADIANT APPOINTMENT (OUTPATIENT)
Dept: MAMMOGRAPHY | Facility: CLINIC | Age: 63
End: 2018-07-19
Attending: FAMILY MEDICINE
Payer: COMMERCIAL

## 2018-07-19 DIAGNOSIS — I10 ESSENTIAL HYPERTENSION: ICD-10-CM

## 2018-07-19 DIAGNOSIS — G47.00 PERSISTENT INSOMNIA: ICD-10-CM

## 2018-07-19 DIAGNOSIS — E65 ABDOMINAL OBESITY: ICD-10-CM

## 2018-07-19 DIAGNOSIS — J45.40 MODERATE PERSISTENT ASTHMA, UNCOMPLICATED: ICD-10-CM

## 2018-07-19 DIAGNOSIS — F41.9 ANXIETY: ICD-10-CM

## 2018-07-19 DIAGNOSIS — Z11.4 SCREENING FOR HIV (HUMAN IMMUNODEFICIENCY VIRUS): ICD-10-CM

## 2018-07-19 DIAGNOSIS — Z12.31 VISIT FOR SCREENING MAMMOGRAM: ICD-10-CM

## 2018-07-19 PROCEDURE — 77067 SCR MAMMO BI INCL CAD: CPT | Mod: TC

## 2018-09-21 ENCOUNTER — OFFICE VISIT (OUTPATIENT)
Dept: ENDOCRINOLOGY | Facility: CLINIC | Age: 63
End: 2018-09-21
Payer: COMMERCIAL

## 2018-09-21 VITALS
OXYGEN SATURATION: 94 % | WEIGHT: 165.8 LBS | HEART RATE: 73 BPM | DIASTOLIC BLOOD PRESSURE: 96 MMHG | BODY MASS INDEX: 26.65 KG/M2 | HEIGHT: 66 IN | SYSTOLIC BLOOD PRESSURE: 157 MMHG

## 2018-09-21 DIAGNOSIS — E66.3 OVERWEIGHT (BMI 25.0-29.9): Primary | ICD-10-CM

## 2018-09-21 DIAGNOSIS — E65 ABDOMINAL OBESITY: ICD-10-CM

## 2018-09-21 NOTE — PROGRESS NOTES
"        Return Medical Weight Management Note     Ludy Ramos  MRN:  [id number]  :  1955  AMBROSIO:  2018    Dear Hilton, Javan Zimmerman,    I had the pleasure of seeing your patient Ludy Ramos.  She is a 62 year old female who I am continuing to see for treatment of obesity related to:       2018   I have the following co-morbidities associated with obesity: High Blood Pressure       INTERVAL HISTORY:  Pt was seen for intial visit about 3 mo ago.     Detailed assessment of factors/barriers related to lack of wt loss success reviewed--  A/P as follows--  \"Ludy is a patient with mature onset abd abd obesity (BMI in overwt category with HTN co-morbidity) with significant element of familial/genetic influence and with current health consequences.  Ludy Ramos eats a high carb diet, eats a high fat diet, eats fast food once or more per week, uses food as a reward, uses food as mood management, has perception of intense hunger, mostly eats during the evening-significant amount of liquid calories also      Her problem is complicated by strong craving/reward pathways and can work also on shifting lifestyle choices  Plan included--  Decrease portion sizes  Decrease eating out  Purge house of food triggers  Decrease caloric beverages by reducing ETOH, stopping sugared soda, stopping juice  Dietician visit of education  Volumetrics eating plan  Calorie/low fat diet\"    Things we have tried and barriers to wt loss--  Started topiramate and stopped due to tremors, naltrexone had nausea    Cravings/hunger are the issues she is struggling with    ETOH--3 nightly cocktails--she made a plan to stop having those    CURRENT WEIGHT:   165 lbs 12.8 oz    Wt Readings from Last 4 Encounters:   18 165 lb 12.8 oz   18 165 lb   18 164 lb 3.2 oz   18 172 lb 12.8 oz       Height:  5' 5.75\"  Body Mass Index:  Body mass index is 26.96 kg/(m^2).  Vitals:  B/P: 157/96, P: 73, R: Data Unavailable "     Initial consult weight was 172.75# on 2/9/18  Weight change since last seen on 6/8/2018 is up about 2 pounds.   Total loss is 7 pounds.    Diet and Activity Changes Since Last Visit Reviewed With Patient 9/21/2018   I have made the following changes to my diet since my last visit: no   With regards to my diet, I am still struggling with: all   I have made the following changes to my activity/exercise since my last visit: no   With regards to my activity/exercise, I am still struggling with: a11       MEDICATIONS:   Current Outpatient Prescriptions   Medication     albuterol (PROAIR HFA/PROVENTIL HFA/VENTOLIN HFA) 108 (90 Base) MCG/ACT Inhaler     budesonide-formoterol (SYMBICORT) 160-4.5 MCG/ACT Inhaler     citalopram (CELEXA) 20 MG tablet     losartan-hydrochlorothiazide (HYZAAR) 50-12.5 MG per tablet     traZODone (DESYREL) 100 MG tablet     topiramate (TOPAMAX) 25 MG tablet     No current facility-administered medications for this visit.        Weight Loss Medication History Reviewed With Patient 9/21/2018   Which weight loss medications are you currently taking on a regular basis?  None   If you are not taking a weight loss medication that was prescribed to you, please indicate why: I did not like the side effects   Are you having any side effects from the weight loss medication that we have prescribed you? Yes   If you are having side effects please describe: nausea        ASSESSMENT:   BMI in overwt range but with abdominal obesity and obesity-related comorbidity (HTN); attempting to get coverage for pharmacotherapy to provide additional support for lifestyle changes    PLAN:   Stop the evening cocktails    We are considering Contrave--if insurance will cover; options again discussed with her for pharm addition in detail, in terms of potential risks/benefits/possible side effects.  She did not tolerate either topiramate or naltrexone (naltrexone was not SR prep--she had nausea but is open to trying it in  the Contrave formulation)    Dosing as follows:  Week 1- 1 tablet in the morning  Week 2- 1 tablet in the morning and 1 tablet at bedtime  Week 3- 2 tablets in the morning and 1 tablet at bedtime  Week 4 and thereafter- 2 tablets in the morning and 2 tablets at bedtime    We also discussed Saxenda--cost will likely be an issue with that one    Time: about 20/20 min spent on evaluation, management, counseling, education, & motivational interviewing with greater than 50 % of the total time was spent on counseling and coordinating care    Sincerely,    Elvis Boston MD

## 2018-09-21 NOTE — LETTER
"2018       RE: Ludy Ramos  [address]  [place] [address]     Dear Colleague,    Thank you for referring your patient, Ludy Ramos, to the [facility] MEDICAL WEIGHT MANAGEMENT at [facility]. Please see a copy of my visit note below.      Return Medical Weight Management Note     Ludy Ramos  MRN:  [id number]  :  1955  AMBROSIO:  2018    Dear Javan Canela,    I had the pleasure of seeing your patient Ludy Ramos.  She is a 62 year old female who I am continuing to see for treatment of obesity related to:       2018   I have the following co-morbidities associated with obesity: High Blood Pressure       INTERVAL HISTORY:  Pt was seen for intial visit about 3 mo ago.     Detailed assessment of factors/barriers related to lack of wt loss success reviewed--  A/P as follows--  \"Ludy is a patient with mature onset abd abd obesity (BMI in overwt category with HTN co-morbidity) with significant element of familial/genetic influence and with current health consequences.  Ludy Ramos eats a high carb diet, eats a high fat diet, eats fast food once or more per week, uses food as a reward, uses food as mood management, has perception of intense hunger, mostly eats during the evening-significant amount of liquid calories also      Her problem is complicated by strong craving/reward pathways and can work also on shifting lifestyle choices  Plan included--  Decrease portion sizes  Decrease eating out  Purge house of food triggers  Decrease caloric beverages by reducing ETOH, stopping sugared soda, stopping juice  Dietician visit of education  Volumetrics eating plan  Calorie/low fat diet\"    Things we have tried and barriers to wt loss--  Started topiramate and stopped due to tremors, naltrexone had nausea    Cravings/hunger are the issues she is struggling with    ETOH--3 nightly cocktails--she made a plan to stop having " "those    CURRENT WEIGHT:   165 lbs 12.8 oz    Wt Readings from Last 4 Encounters:   09/21/18 165 lb 12.8 oz   07/11/18 165 lb   06/08/18 164 lb 3.2 oz   02/09/18 172 lb 12.8 oz       Height:  5' 5.75\"  Body Mass Index:  Body mass index is 26.96 kg/(m^2).  Vitals:  B/P: 157/96, P: 73, R: Data Unavailable     Initial consult weight was 172.75# on 2/9/18  Weight change since last seen on 6/8/2018 is up about 2 pounds.   Total loss is 7 pounds.    Diet and Activity Changes Since Last Visit Reviewed With Patient 9/21/2018   I have made the following changes to my diet since my last visit: no   With regards to my diet, I am still struggling with: all   I have made the following changes to my activity/exercise since my last visit: no   With regards to my activity/exercise, I am still struggling with: a11       MEDICATIONS:   Current Outpatient Prescriptions   Medication     albuterol (PROAIR HFA/PROVENTIL HFA/VENTOLIN HFA) 108 (90 Base) MCG/ACT Inhaler     budesonide-formoterol (SYMBICORT) 160-4.5 MCG/ACT Inhaler     citalopram (CELEXA) 20 MG tablet     losartan-hydrochlorothiazide (HYZAAR) 50-12.5 MG per tablet     traZODone (DESYREL) 100 MG tablet     topiramate (TOPAMAX) 25 MG tablet     No current facility-administered medications for this visit.        Weight Loss Medication History Reviewed With Patient 9/21/2018   Which weight loss medications are you currently taking on a regular basis?  None   If you are not taking a weight loss medication that was prescribed to you, please indicate why: I did not like the side effects   Are you having any side effects from the weight loss medication that we have prescribed you? Yes   If you are having side effects please describe: nausea        ASSESSMENT:   BMI in overwt range but with abdominal obesity and obesity-related comorbidity (HTN); attempting to get coverage for pharmacotherapy to provide additional support for lifestyle changes    PLAN:   Stop the evening " cocktails    We are considering Contrave--if insurance will cover; options again discussed with her for pharm addition in detail, in terms of potential risks/benefits/possible side effects.  She did not tolerate either topiramate or naltrexone (naltrexone was not SR prep--she had nausea but is open to trying it in the Contrave formulation)    Dosing as follows:  Week 1- 1 tablet in the morning  Week 2- 1 tablet in the morning and 1 tablet at bedtime  Week 3- 2 tablets in the morning and 1 tablet at bedtime  Week 4 and thereafter- 2 tablets in the morning and 2 tablets at bedtime    We also discussed Saxenda--cost will likely be an issue with that one    Time: about 20/20 min spent on evaluation, management, counseling, education, & motivational interviewing with greater than 50 % of the total time was spent on counseling and coordinating care    Sincerely,    Elvis Boston MD

## 2018-09-21 NOTE — NURSING NOTE
"  Chief Complaint   Patient presents with     Weight Problem     RMWM     Vitals:    09/21/18 1124   BP: (!) 157/96   Pulse: 73   SpO2: 94%   Weight: 165 lb 12.8 oz   Height: 5' 5.75\"     Body mass index is 26.96 kg/(m^2).  Ramona Lopes CMA    "

## 2018-09-21 NOTE — MR AVS SNAPSHOT
After Visit Summary   9/21/2018    Ludy Ramos    MRN: [id number]           Patient Information     Date Of Birth          1955        Visit Information        Provider Department      9/21/2018 11:20 AM Elvis Boston MD [facility] Medical Weight Management        Care Instructions    Stop the evening cocktails      We are considering Contrave    MEDICATION STARTED AT THIS APPOINTMENT    We are considering starting Contrave. Contrave is specifically prescribed for obesity. It is a combination of two medications, Naltrexone and Bupropione (Wellbutrin). The Bupropion helps lessen appetite and the Naltrexone works by blocking certain receptors in the brain and curbing cravings.      For some of our patients the medication works right away. Other patients don't feel much of a change but find they've lost weight. Like all weight loss medications, Contrave works best when you help it work. This means:  1. Having less tempting high calorie (fattening) food around the house or office. (For people with strong cravings this is very important.)   2. Staying away from situations or people that may trigger your cravings .   3. Eating out only one time or less each week.  4. Eating your meals at a table with the TV or computer off.    WARNING: This medication blocks the action of opioid type pain medications. If you routinely take any medication like Codeine, Oxycontin, Percocet, Morphine, Dilaudid or Methodone, do not take this until you have talked with weight management staff.     FYI- If you are planning on having an elective surgery, you should start titrating yourself off Contrave 4 weeks prior to surgery. This would entail decreasing the same way you started the medication, by taking one tablet less per week for 4 weeks, until you are able to stop the medication. If you need to stop it quicker, you can take 1 tablet in the morning and 1 tablet in the evening for 2 weeks, and then stop the  medication. Please don't hesitate to call if you have any questions regarding this.    Dosing as follows:  Week 1- 1 tablet in the morning  Week 2- 1 tablet in the morning and 1 tablet at bedtime  Week 3- 2 tablets in the morning and 1 tablet at bedtime  Week 4 and thereafter- 2 tablets in the morning and 2 tablets at bedtime    Side-effects: The most common side effect include: nausea; constipation; headache; vomiting; dizziness; diarrhea; trouble sleeping; and dry mouth.     This medication typically isn t covered by insurance and will require a prior authorization, which can take 1-2 weeks to review. Patients can visit www.[email or web address].com and sign up for the Pharmacy savings program and receive the medication for $60-70 per month for 12 months if eligible.    Call the nurse at [phone] if you have any questions or concerns. (Do not stop taking it if you don't think it's working. For some people it works without them knowing it.)       In order to get refills of this or any medication we prescribe you must be seen in the medical weight mgmt clinic every 2-4 months. Please have your pharmacy fax a refill request to [phone].        We are considering Saxenda  MEDICATION STARTED AT THIS APPOINTMENT    We are starting a GLP-1 (Glucagon-like Peptide-1) medication called Saxenda. One of the ways it works is by slowing down the rate that food leaves your stomach. You feel christian and will eat less. It also helps regulate hormones that can help improve your blood sugars.    If you are a patient that checks blood sugars, continue to check as instructed by your doctor. Low blood sugars are rare but can happen if patients are on insulin or other oral agents. If you notice consistent low sugars or high sugars, your medication may need to be adjusted after your appointment. If this is the case, please call RN and provide her your blood sugar record from the last 3-4 days. The RN will get in touch with the doctor and call  you back/[employer] message with recommendations. We tolerate high sugars for a bit, so if sugars are running 180-200, this is ok. As weight starts dropping the blood sugars should too. If readings are consistently over 200 for 1-2 weeks, then you should call the doctor/nurse.    Dosing for this medication:   Week 1- Inject 0.6 mg daily  Week 2- Inject 1.2 mg daily  Week 3- Inject 1.8 mg daily  Week 4- Inject 2.4 mg daily  Week 5 and thereafter- Inject 3.0 mg daily    Side effects of GLP- Medications include: The most common side effects are all GI related and consist of: nausea, constipation, diarrhea, burping, or gassiness. Patients are advised to eat slowly and less, and nausea typically passes if people can stick it out.     The risk of pancreatitis (inflammation of the pancreas) has been associated with this type of medication, but is very rare.  If you have had pancreatitis in the past, this medication may not be for you. Please let us know about any past history of pancreas problems.    Symptoms of pancreatitis include: Pain in your upper stomach area which may travel to your back and be worse after eating. Your stomach area may be tender to the touch.  You may have vomiting or nausea and/or have a fever. If you should develop any of these symptoms, stop the medication and contact your primary care doctor. They will do a blood test to check for pancreatitis.         There is a small chance you may have some low blood sugar after taking the medication.   The signs of low blood sugar are:  o Weakness  o Shaky   o Hungry  o Sweating  o Confusion      See below for ways to treat low blood sugar without adding in lots of extra calories.      Treating Low Blood Sugar    If you have symptoms of low blood sugar (sweating, shaking, dizzy, confused) eat 15 grams of carbs and wait 15 minutes:      Glucose Tabs are best for sugars under 70 -  Dex4 or BD Glucose tablets are good, you will need to take 3-4 of these to equal  15 grams.       One small box of raisins    4 oz fruit juice box or   cup fruit juice    1 small apple    1 small banana      cup canned fruit in water      English muffin or a slice of bread with jelly     1 low fat frozen waffle with sugar-free syrup      cup cottage cheese with   cup frozen or fresh blueberries    1 cup skim or low-fat milk      cup whole grain cereal    4-6 crackers such as Triscuits      This medication is usually not covered by insurance and can be quite expensive. Sometimes a prior authorization is required, which may take up to 1-2 weeks for an insurance company to make a decision if they will cover the medication. Please be patient, you will be notified after a decision has been made.    Call the nurse at [phone] if you have any questions or concerns. (Do not stop taking it if you don't think it's working. For some people it works without them knowing it.)     In order to get refills of this or any medication we prescribe you must be seen in the medical weight mgmt clinic every 2-4 months. Please have your pharmacy fax a refill request to [phone].      Appointment with Samaria Manzanares in about 4 wks          Follow-ups after your visit        Additional Services     MED THERAPY MANAGE REFERRAL       Your provider has referred you to: **[facility] Scheduling (numerous locations) [phone]   [email or web address]  With Samaria Manzanares for med management with wt loss meds in 1 mo    Reason for Referral: see above    The [place] [facility] department will contact you to schedule an appointment.  You may also schedule the appointment by calling [phone].  For [facility] patients, please call [phone] to confirm/schedule your appointment on the next business day.    This service is designed to help you get the most from your medications.  A specially trained Pharmacist will  "work closely with you and your providers to solve any questions, concerns, issues or problems related to your medications.    Please bring all of your prescription and non-prescription medications (such as vitamins, over-the-counter medications, and herbals) or a detailed medication list to your appointment.    If you have a glucose meter or other home monitoring information, please also bring this to your appointment (i.e. blood glucose log, blood pressure log, pain log, etc.).                  Follow-up notes from your care team     Return in about 3 months (around 12/21/2018).      Who to contact     Please call your clinic at [phone] to:    Ask questions about your health    Make or cancel appointments    Discuss your medicines    Learn about your test results    Speak to your doctor            Additional Information About Your Visit        [employer] Information     [employer] gives you secure access to your electronic health record. If you see a primary care provider, you can also send messages to your care team and make appointments. If you have questions, please call your primary care clinic.  If you do not have a primary care provider, please call [phone] and they will assist you.      [employer] is an electronic gateway that provides easy, online access to your medical records. With [employer], you can request a clinic appointment, read your test results, renew a prescription or communicate with your care team.     To access your existing account, please contact your [facility] or call [phone] for assistance.        Care EveryWhere ID     This is your Care EveryWhere ID. This could be used by other organizations to access your [place] medical records  [id number]        Your Vitals Were     Pulse Height Pulse Oximetry BMI (Body Mass Index)          73 5' 5.75\" 94% 26.96 kg/m2         Blood Pressure from Last 3 Encounters:   09/21/18 (!) 157/96   07/11/18 134/83 "   06/08/18 161/87    Weight from Last 3 Encounters:   09/21/18 165 lb 12.8 oz   07/11/18 165 lb   06/08/18 164 lb 3.2 oz              We Performed the Following     MED THERAPY MANAGE REFERRAL        Primary Care Provider Office Phone # Fax #    Javan Canela [phone] [address] [facility] [address]        Equal Access to Services     BROOKS HOGAN : Hadii aad ku hadasho Soomaali, waaxda luqadaha, qaybta kaalmada adeegyada, waxay idiin hayaan adeeg khnahidsh layuniorn . So [facility] [phone].    ATENCIÓN: Si habla español, tiene a aden disposición servicios gratuitos de asistencia lingüística. Llame al [phone].    We comply with applicable federal civil rights laws and [place] laws. We do not discriminate on the basis of race, color, national origin, age, disability, sex, sexual orientation, or gender identity.            Thank you!     Thank you for choosing [facility] MEDICAL WEIGHT MANAGEMENT  for your care. Our goal is always to provide you with excellent care. Hearing back from our patients is one way we can continue to improve our services. Please take a few minutes to complete the written survey that you may receive in the mail after your visit with us. Thank you!             Your Updated Medication List - Protect others around you: Learn how to safely use, store and throw away your medicines at www.disposemymeds.org.          This list is accurate as of 9/21/18 12:24 PM.  Always use your most recent med list.                   Brand Name Dispense Instructions for use Diagnosis    albuterol 108 (90 Base) MCG/ACT inhaler    PROAIR HFA/PROVENTIL HFA/VENTOLIN HFA    1 Inhaler    Inhale 2 puffs into the lungs every 4 hours as needed for shortness of breath / dyspnea    Moderate persistent asthma, uncomplicated, Visit for screening mammogram, Screening for HIV (human immunodeficiency virus), Anxiety, Essential hypertension, Abdominal obesity, Persistent insomnia        budesonide-formoterol 160-4.5 MCG/ACT Inhaler    SYMBICORT    3 Inhaler    Inhale 2 puffs into the lungs 2 times daily    Moderate persistent asthma, uncomplicated, Visit for screening mammogram, Screening for HIV (human immunodeficiency virus), Anxiety, Essential hypertension, Abdominal obesity, Persistent insomnia       citalopram 20 MG tablet    celeXA    90 tablet    Take 1 tablet (20 mg) by mouth daily    Anxiety, Visit for screening mammogram, Screening for HIV (human immunodeficiency virus), Moderate persistent asthma, uncomplicated, Essential hypertension, Abdominal obesity, Persistent insomnia       losartan-hydrochlorothiazide 50-12.5 MG per tablet    HYZAAR    90 tablet    Take 1 tablet by mouth daily    Essential hypertension, Visit for screening mammogram, Screening for HIV (human immunodeficiency virus), Moderate persistent asthma, uncomplicated, Anxiety, Abdominal obesity, Persistent insomnia       topiramate 25 MG tablet    TOPAMAX    60 tablet    25 mg for 1 week, then 50 mg (2 tablets) daily; take as single dose at supper or can take 25 mg twice daily, as tolerated    Abdominal obesity, Visit for screening mammogram, Screening for HIV (human immunodeficiency virus), Moderate persistent asthma, uncomplicated, Anxiety, Essential hypertension, Persistent insomnia       traZODone 100 MG tablet    DESYREL    180 tablet    Take 2 tablets (200 mg) by mouth At Bedtime APPT NEEDED FOR FURTHER REFILLS    Persistent insomnia, Visit for screening mammogram, Screening for HIV (human immunodeficiency virus), Moderate persistent asthma, uncomplicated, Anxiety, Essential hypertension, Abdominal obesity

## 2018-09-21 NOTE — PATIENT INSTRUCTIONS
Stop the evening cocktails      We are considering Contrave    MEDICATION STARTED AT THIS APPOINTMENT    We are considering starting Contrave. Contrave is specifically prescribed for obesity. It is a combination of two medications, Naltrexone and Bupropione (Wellbutrin). The Bupropion helps lessen appetite and the Naltrexone works by blocking certain receptors in the brain and curbing cravings.      For some of our patients the medication works right away. Other patients don't feel much of a change but find they've lost weight. Like all weight loss medications, Contrave works best when you help it work. This means:  1. Having less tempting high calorie (fattening) food around the house or office. (For people with strong cravings this is very important.)   2. Staying away from situations or people that may trigger your cravings .   3. Eating out only one time or less each week.  4. Eating your meals at a table with the TV or computer off.    WARNING: This medication blocks the action of opioid type pain medications. If you routinely take any medication like Codeine, Oxycontin, Percocet, Morphine, Dilaudid or Methodone, do not take this until you have talked with weight management staff.     FYI- If you are planning on having an elective surgery, you should start titrating yourself off Contrave 4 weeks prior to surgery. This would entail decreasing the same way you started the medication, by taking one tablet less per week for 4 weeks, until you are able to stop the medication. If you need to stop it quicker, you can take 1 tablet in the morning and 1 tablet in the evening for 2 weeks, and then stop the medication. Please don't hesitate to call if you have any questions regarding this.    Dosing as follows:  Week 1- 1 tablet in the morning  Week 2- 1 tablet in the morning and 1 tablet at bedtime  Week 3- 2 tablets in the morning and 1 tablet at bedtime  Week 4 and thereafter- 2 tablets in the morning and 2 tablets at  bedtime    Side-effects: The most common side effect include: nausea; constipation; headache; vomiting; dizziness; diarrhea; trouble sleeping; and dry mouth.     This medication typically isn t covered by insurance and will require a prior authorization, which can take 1-2 weeks to review. Patients can visit [email or web address] and sign up for the Pharmacy savings program and receive the medication for $60-70 per month for 12 months if eligible.    Call the nurse at [phone] if you have any questions or concerns. (Do not stop taking it if you don't think it's working. For some people it works without them knowing it.)       In order to get refills of this or any medication we prescribe you must be seen in the medical weight mgmt clinic every 2-4 months. Please have your pharmacy fax a refill request to [phone].        We are considering Saxenda  MEDICATION STARTED AT THIS APPOINTMENT    We are starting a GLP-1 (Glucagon-like Peptide-1) medication called Saxenda. One of the ways it works is by slowing down the rate that food leaves your stomach. You feel christian and will eat less. It also helps regulate hormones that can help improve your blood sugars.    If you are a patient that checks blood sugars, continue to check as instructed by your doctor. Low blood sugars are rare but can happen if patients are on insulin or other oral agents. If you notice consistent low sugars or high sugars, your medication may need to be adjusted after your appointment. If this is the case, please call RN and provide her your blood sugar record from the last 3-4 days. The RN will get in touch with the doctor and call you back/HungerTimehart message with recommendations. We tolerate high sugars for a bit, so if sugars are running 180-200, this is ok. As weight starts dropping the blood sugars should too. If readings are consistently over 200 for 1-2 weeks, then you should call the doctor/nurse.    Dosing for this medication:   Week 1-  Inject 0.6 mg daily  Week 2- Inject 1.2 mg daily  Week 3- Inject 1.8 mg daily  Week 4- Inject 2.4 mg daily  Week 5 and thereafter- Inject 3.0 mg daily    Side effects of GLP- Medications include: The most common side effects are all GI related and consist of: nausea, constipation, diarrhea, burping, or gassiness. Patients are advised to eat slowly and less, and nausea typically passes if people can stick it out.     The risk of pancreatitis (inflammation of the pancreas) has been associated with this type of medication, but is very rare.  If you have had pancreatitis in the past, this medication may not be for you. Please let us know about any past history of pancreas problems.    Symptoms of pancreatitis include: Pain in your upper stomach area which may travel to your back and be worse after eating. Your stomach area may be tender to the touch.  You may have vomiting or nausea and/or have a fever. If you should develop any of these symptoms, stop the medication and contact your primary care doctor. They will do a blood test to check for pancreatitis.         There is a small chance you may have some low blood sugar after taking the medication.   The signs of low blood sugar are:  o Weakness  o Shaky   o Hungry  o Sweating  o Confusion      See below for ways to treat low blood sugar without adding in lots of extra calories.      Treating Low Blood Sugar    If you have symptoms of low blood sugar (sweating, shaking, dizzy, confused) eat 15 grams of carbs and wait 15 minutes:      Glucose Tabs are best for sugars under 70 -  Dex4 or BD Glucose tablets are good, you will need to take 3-4 of these to equal 15 grams.       One small box of raisins    4 oz fruit juice box or   cup fruit juice    1 small apple    1 small banana      cup canned fruit in water      English muffin or a slice of bread with jelly     1 low fat frozen waffle with sugar-free syrup      cup cottage cheese with   cup frozen or fresh  blueberries    1 cup skim or low-fat milk      cup whole grain cereal    4-6 crackers such as Triscuits      This medication is usually not covered by insurance and can be quite expensive. Sometimes a prior authorization is required, which may take up to 1-2 weeks for an insurance company to make a decision if they will cover the medication. Please be patient, you will be notified after a decision has been made.    Call the nurse at [phone] if you have any questions or concerns. (Do not stop taking it if you don't think it's working. For some people it works without them knowing it.)     In order to get refills of this or any medication we prescribe you must be seen in the medical weight mgmt clinic every 2-4 months. Please have your pharmacy fax a refill request to [phone].      Appointment with Samaria Manzanares in about 4 wks

## 2018-09-22 ENCOUNTER — TELEPHONE (OUTPATIENT)
Dept: ENDOCRINOLOGY | Facility: CLINIC | Age: 63
End: 2018-09-22

## 2018-09-22 NOTE — TELEPHONE ENCOUNTER
Reason for call:  Medication   If this is a refill request, has the caller requested the refill from the pharmacy already? No  Will the patient be using a [facility]? No  Name of the pharmacy and phone number for the current request: [facility]     Name of the medication requested: CONTRATE    Other request: FAX PRESCRIPTION TO PHARMACY    Phone number to reach patient:  Cell number on file:    Telephone Information:   Mobile [phone]       Best Time:  ANYTIME    Can we leave a detailed message on this number?  YES

## 2018-09-24 ENCOUNTER — TELEPHONE (OUTPATIENT)
Dept: ENDOCRINOLOGY | Facility: CLINIC | Age: 63
End: 2018-09-24

## 2018-09-24 NOTE — TELEPHONE ENCOUNTER
Patient has [employer]/[facility]; [id number]; [id number]; GP-[facility]; ID-[id number]    Central Prior Authorization Team   Phone: [phone]      PA Initiation    Medication: Contrave-PA initited  Insurance Company:    Pharmacy Filling the Rx: [facility] [address] [place] [id number] [facility]  Filling Pharmacy Phone: [phone]  Filling Pharmacy Fax:    Start Date: 9/24/2018

## 2018-09-24 NOTE — TELEPHONE ENCOUNTER
Prior Authorization Retail Medication Request    Medication/Dose: Contrave  ICD code (if different than what is on RX):    Overweight (BMI 25.0-29.9) [E66.3]  - Primary       Abdominal obesity [E65]         Previously Tried and Failed:  Topiramate, Naltrexone, history of diet and exercise   Rationale:  Started topiramate and stopped due to tremors, naltrexone had nausea, history of hypertension so Phentermine would not be a good option    Insurance Name:    Insurance ID:        Pharmacy Information (if different than what is on RX)  Name:  [facility]  Phone:  [phone]

## 2018-09-24 NOTE — TELEPHONE ENCOUNTER
PRIOR AUTHORIZATION DENIED    Medication: Contrave-PA denied    Denial Date: 9/24/2018    Denial Rational:         Appeal Information:  N/A

## 2018-10-02 ENCOUNTER — TELEPHONE (OUTPATIENT)
Dept: FAMILY MEDICINE | Facility: CLINIC | Age: 63
End: 2018-10-02

## 2018-10-02 NOTE — LETTER
Ludy Ramos  [address]  [place] [address]          October 2, 2018          Dear Ludy Ramos      Our records indicate that you have not scheduled for a(n)appointment with Javan Canela MD for hypertension, which was recommended by your health care team. Monitoring and managing your preventative and chronic health conditions are very important to us.       If you have received your health care elsewhere, please provide us with that information so it can be documented in your chart.    Please call [phone] or message us through your [employer] account to schedule an appointment or provide information for your chart.     We look forward to seeing you and working with you on your health care needs.     Sincerely,   Javan Canela MD          *If you have already scheduled an appointment, please disregard this reminder

## 2018-10-02 NOTE — TELEPHONE ENCOUNTER
Panel Management Review      Patient has the following on her problem list:     Asthma review     ACT Total Scores 7/11/2018   ACT TOTAL SCORE -   ASTHMA ER VISITS -   ASTHMA HOSPITALIZATIONS -   ACT TOTAL SCORE (Goal Greater than or Equal to 20) 24   In the past 12 months, how many times did you visit the emergency room for your asthma without being admitted to the hospital? 0   In the past 12 months, how many times were you hospitalized overnight because of your asthma? 0      1. Is Asthma diagnosis on the Problem List? Yes    2. Is Asthma listed on Health Maintenance? Yes    3. Patient is due for:  AAP    Hypertension   Last three blood pressure readings:  BP Readings from Last 3 Encounters:   09/21/18 (!) 157/96   07/11/18 134/83   06/08/18 161/87     Blood pressure: FAILED    HTN Guidelines:  Age 18-59 BP range:  Less than 140/90  Age 60-85 with Diabetes:  Less than 140/90  Age 60-85 without Diabetes:  less than 150/90      Composite cancer screening  Chart review shows that this patient is due/due soon for the following None  Summary:    Patient is due/failing the following:   AAP and BP CHECK    Action needed:   Patient needs office visit for hypertension.    Type of outreach:    Sent letter.    Questions for provider review:    None                                                                                                                                    Anna,CISCO       Chart routed to none .

## 2019-01-31 DIAGNOSIS — F41.9 ANXIETY: ICD-10-CM

## 2019-01-31 DIAGNOSIS — I10 ESSENTIAL HYPERTENSION: ICD-10-CM

## 2019-01-31 DIAGNOSIS — Z12.31 VISIT FOR SCREENING MAMMOGRAM: ICD-10-CM

## 2019-01-31 DIAGNOSIS — J45.40 MODERATE PERSISTENT ASTHMA, UNCOMPLICATED: ICD-10-CM

## 2019-01-31 DIAGNOSIS — Z11.4 SCREENING FOR HIV (HUMAN IMMUNODEFICIENCY VIRUS): ICD-10-CM

## 2019-01-31 DIAGNOSIS — G47.00 PERSISTENT INSOMNIA: ICD-10-CM

## 2019-01-31 DIAGNOSIS — E65 ABDOMINAL OBESITY: ICD-10-CM

## 2019-01-31 RX ORDER — LOSARTAN POTASSIUM AND HYDROCHLOROTHIAZIDE 12.5; 5 MG/1; MG/1
TABLET ORAL
Qty: 90 TABLET | Refills: 0 | Status: SHIPPED | OUTPATIENT
Start: 2019-01-31 | End: 2019-05-01

## 2019-03-04 DIAGNOSIS — Z12.31 VISIT FOR SCREENING MAMMOGRAM: ICD-10-CM

## 2019-03-04 DIAGNOSIS — J45.40 MODERATE PERSISTENT ASTHMA, UNCOMPLICATED: ICD-10-CM

## 2019-03-04 DIAGNOSIS — E65 ABDOMINAL OBESITY: ICD-10-CM

## 2019-03-04 DIAGNOSIS — F41.9 ANXIETY: ICD-10-CM

## 2019-03-04 DIAGNOSIS — Z11.4 SCREENING FOR HIV (HUMAN IMMUNODEFICIENCY VIRUS): ICD-10-CM

## 2019-03-04 DIAGNOSIS — G47.00 PERSISTENT INSOMNIA: ICD-10-CM

## 2019-03-04 DIAGNOSIS — I10 ESSENTIAL HYPERTENSION: ICD-10-CM

## 2019-03-04 RX ORDER — CITALOPRAM HYDROBROMIDE 20 MG/1
TABLET ORAL
Qty: 90 TABLET | Refills: 0 | Status: SHIPPED | OUTPATIENT
Start: 2019-03-04 | End: 2019-05-15

## 2019-03-05 RX ORDER — TRAZODONE HYDROCHLORIDE 100 MG/1
TABLET ORAL
Qty: 180 TABLET | Refills: 1 | Status: SHIPPED | OUTPATIENT
Start: 2019-03-05 | End: 2019-05-15

## 2019-03-05 NOTE — TELEPHONE ENCOUNTER
To MD to advise on trazodone refill request  Warning comes up with the citalopram when attempting to refill.  Last OV Dr. Javan Canela: July, 2018  Saw endo 9/21/18 for her obesity.

## 2019-04-30 DIAGNOSIS — I10 ESSENTIAL HYPERTENSION: ICD-10-CM

## 2019-04-30 DIAGNOSIS — Z12.31 VISIT FOR SCREENING MAMMOGRAM: ICD-10-CM

## 2019-04-30 DIAGNOSIS — E65 ABDOMINAL OBESITY: ICD-10-CM

## 2019-04-30 DIAGNOSIS — Z11.4 SCREENING FOR HIV (HUMAN IMMUNODEFICIENCY VIRUS): ICD-10-CM

## 2019-04-30 DIAGNOSIS — F41.9 ANXIETY: ICD-10-CM

## 2019-04-30 DIAGNOSIS — G47.00 PERSISTENT INSOMNIA: ICD-10-CM

## 2019-04-30 DIAGNOSIS — J45.40 MODERATE PERSISTENT ASTHMA, UNCOMPLICATED: ICD-10-CM

## 2019-04-30 NOTE — LETTER
May 8, 2019    Ludy Ramos  [address]  [place] [address]    Dear Ludy,       We recently received a refill request for losartan-hydrochlorothiazide (HYZAAR).  Your refill request has been denied because you are due for:     office visit and pre-visit lab appointment    Please schedule this lab appointment 4-5 days prior to the office visit.     Please call at your earliest convenience so that there will not be a delay with your future refills.          Thank you,   Your [facility] Team/[facility]  [phone]

## 2019-05-01 ENCOUNTER — MYC REFILL (OUTPATIENT)
Dept: FAMILY MEDICINE | Facility: CLINIC | Age: 64
End: 2019-05-01

## 2019-05-01 DIAGNOSIS — F41.9 ANXIETY: ICD-10-CM

## 2019-05-01 DIAGNOSIS — E65 ABDOMINAL OBESITY: ICD-10-CM

## 2019-05-01 DIAGNOSIS — Z11.4 SCREENING FOR HIV (HUMAN IMMUNODEFICIENCY VIRUS): ICD-10-CM

## 2019-05-01 DIAGNOSIS — G47.00 PERSISTENT INSOMNIA: ICD-10-CM

## 2019-05-01 DIAGNOSIS — Z12.31 VISIT FOR SCREENING MAMMOGRAM: ICD-10-CM

## 2019-05-01 DIAGNOSIS — I10 ESSENTIAL HYPERTENSION: ICD-10-CM

## 2019-05-01 DIAGNOSIS — J45.40 MODERATE PERSISTENT ASTHMA, UNCOMPLICATED: ICD-10-CM

## 2019-05-01 RX ORDER — LOSARTAN POTASSIUM AND HYDROCHLOROTHIAZIDE 12.5; 5 MG/1; MG/1
1 TABLET ORAL DAILY
Qty: 30 TABLET | Refills: 0 | Status: SHIPPED | OUTPATIENT
Start: 2019-05-01 | End: 2019-05-15

## 2019-05-01 NOTE — TELEPHONE ENCOUNTER
Dr. Javan Canela;  Please advise if can refill prescription for 2 weeks;  She is leaving [facility] 5/4 for one week.  Appointment with you on May 13.    Last OV with you 7/2018.  Saw endocrine 9/21/18    BP Readings from Last 3 Encounters:   09/21/18 (!) 157/96   07/11/18 134/83   06/08/18 161/87

## 2019-05-01 NOTE — TELEPHONE ENCOUNTER
Name from pharmacy: LOSARTAN/HCT 50-12.5MG TAB          Will file in chart as: losartan-hydrochlorothiazide (HYZAAR) 50-12.5 MG tablet    Sig: TAKE 1 TABLET BY MOUTH ONCE DAILY    Disp:  90 tablet    Refills:  0    Start: 4/30/2019    Class: E-Prescribe    For: Essential hypertension; Visit for screening mammogram; Screening for HIV (human immunodeficiency virus); Moderate persistent asthma, uncomplicated; Anxiety; Abdominal obesity; Persistent insomnia    Requested on: 1/31/2019    Last ordered: 3 months ago by Javan Canela MD Last refill: 4/3/2019    Rx #: [id number]    Angiotensin-II Receptors Failed4/30 7:08 PM   Blood pressure under 140/90 in past 12 months    Normal serum creatinine on file in past 12 months    Normal serum potassium on file in past 12 months        Last OV: 7/11/18 Dr. Javan Canela;  9/21/18 endocrine  BP Readings from Last 3 Encounters:   09/21/18 (!) 157/96   07/11/18 134/83   06/08/18 161/87     Creatinine   Date Value Ref Range Status   09/25/2017 0.76 0.52 - 1.04 mg/dL Final     Potassium   Date Value Ref Range Status   09/25/2017 3.7 3.4 - 5.3 mmol/L Final

## 2019-05-02 RX ORDER — LOSARTAN POTASSIUM AND HYDROCHLOROTHIAZIDE 12.5; 5 MG/1; MG/1
TABLET ORAL
Qty: 90 TABLET | Refills: 0 | OUTPATIENT
Start: 2019-05-02

## 2019-05-08 ENCOUNTER — DOCUMENTATION ONLY (OUTPATIENT)
Dept: FAMILY MEDICINE | Facility: CLINIC | Age: 64
End: 2019-05-08

## 2019-05-08 DIAGNOSIS — Z13.220 LIPID SCREENING: ICD-10-CM

## 2019-05-08 DIAGNOSIS — I10 HTN (HYPERTENSION): Primary | ICD-10-CM

## 2019-05-08 DIAGNOSIS — I10 ESSENTIAL HYPERTENSION: ICD-10-CM

## 2019-05-08 NOTE — PROGRESS NOTES
.Please place or confirm pending lab orders for upcoming lab appointment on 5/13/19  Thank you,  Cheryl

## 2019-05-13 DIAGNOSIS — Z13.220 LIPID SCREENING: ICD-10-CM

## 2019-05-13 DIAGNOSIS — I10 ESSENTIAL HYPERTENSION: ICD-10-CM

## 2019-05-13 LAB
ANION GAP SERPL CALCULATED.3IONS-SCNC: 7 MMOL/L (ref 3–14)
BUN SERPL-MCNC: 13 MG/DL (ref 7–30)
CALCIUM SERPL-MCNC: 8.6 MG/DL (ref 8.5–10.1)
CHLORIDE SERPL-SCNC: 102 MMOL/L (ref 94–109)
CHOLEST SERPL-MCNC: 215 MG/DL
CO2 SERPL-SCNC: 30 MMOL/L (ref 20–32)
CREAT SERPL-MCNC: 0.8 MG/DL (ref 0.52–1.04)
GFR SERPL CREATININE-BSD FRML MDRD: 77 ML/MIN/{1.73_M2}
GLUCOSE SERPL-MCNC: 87 MG/DL (ref 70–99)
HDLC SERPL-MCNC: 127 MG/DL
LDLC SERPL CALC-MCNC: 77 MG/DL
NONHDLC SERPL-MCNC: 88 MG/DL
POTASSIUM SERPL-SCNC: 4.3 MMOL/L (ref 3.4–5.3)
SODIUM SERPL-SCNC: 139 MMOL/L (ref 133–144)
TRIGL SERPL-MCNC: 55 MG/DL

## 2019-05-13 PROCEDURE — 80061 LIPID PANEL: CPT | Performed by: FAMILY MEDICINE

## 2019-05-13 PROCEDURE — 80048 BASIC METABOLIC PNL TOTAL CA: CPT | Performed by: FAMILY MEDICINE

## 2019-05-13 PROCEDURE — 36415 COLL VENOUS BLD VENIPUNCTURE: CPT | Performed by: FAMILY MEDICINE

## 2019-05-15 ENCOUNTER — OFFICE VISIT (OUTPATIENT)
Dept: FAMILY MEDICINE | Facility: CLINIC | Age: 64
End: 2019-05-15
Payer: COMMERCIAL

## 2019-05-15 VITALS
DIASTOLIC BLOOD PRESSURE: 86 MMHG | TEMPERATURE: 98 F | SYSTOLIC BLOOD PRESSURE: 166 MMHG | OXYGEN SATURATION: 92 % | HEART RATE: 81 BPM | RESPIRATION RATE: 18 BRPM | BODY MASS INDEX: 28.28 KG/M2 | WEIGHT: 176 LBS | HEIGHT: 66 IN

## 2019-05-15 DIAGNOSIS — M25.512 CHRONIC LEFT SHOULDER PAIN: ICD-10-CM

## 2019-05-15 DIAGNOSIS — J45.40 MODERATE PERSISTENT ASTHMA, UNCOMPLICATED: ICD-10-CM

## 2019-05-15 DIAGNOSIS — G47.00 PERSISTENT INSOMNIA: ICD-10-CM

## 2019-05-15 DIAGNOSIS — F41.9 ANXIETY: ICD-10-CM

## 2019-05-15 DIAGNOSIS — Z11.4 SCREENING FOR HIV (HUMAN IMMUNODEFICIENCY VIRUS): ICD-10-CM

## 2019-05-15 DIAGNOSIS — E65 ABDOMINAL OBESITY: ICD-10-CM

## 2019-05-15 DIAGNOSIS — I10 ESSENTIAL HYPERTENSION: ICD-10-CM

## 2019-05-15 DIAGNOSIS — Z12.31 VISIT FOR SCREENING MAMMOGRAM: ICD-10-CM

## 2019-05-15 DIAGNOSIS — G89.29 CHRONIC LEFT SHOULDER PAIN: ICD-10-CM

## 2019-05-15 DIAGNOSIS — R07.9 CHEST PAIN, UNSPECIFIED TYPE: Primary | ICD-10-CM

## 2019-05-15 PROCEDURE — 93000 ELECTROCARDIOGRAM COMPLETE: CPT | Performed by: FAMILY MEDICINE

## 2019-05-15 PROCEDURE — 99214 OFFICE O/P EST MOD 30 MIN: CPT | Performed by: FAMILY MEDICINE

## 2019-05-15 RX ORDER — LOSARTAN POTASSIUM AND HYDROCHLOROTHIAZIDE 12.5; 5 MG/1; MG/1
1 TABLET ORAL DAILY
Qty: 30 TABLET | Refills: 0 | Status: SHIPPED | OUTPATIENT
Start: 2019-05-15 | End: 2019-07-05

## 2019-05-15 RX ORDER — CITALOPRAM HYDROBROMIDE 20 MG/1
20 TABLET ORAL DAILY
Qty: 90 TABLET | Refills: 1 | Status: SHIPPED | OUTPATIENT
Start: 2019-05-15 | End: 2019-07-17

## 2019-05-15 RX ORDER — BUDESONIDE AND FORMOTEROL FUMARATE DIHYDRATE 160; 4.5 UG/1; UG/1
2 AEROSOL RESPIRATORY (INHALATION) 2 TIMES DAILY
Qty: 3 INHALER | Refills: 3 | Status: SHIPPED | OUTPATIENT
Start: 2019-05-15 | End: 2019-12-18

## 2019-05-15 RX ORDER — ALBUTEROL SULFATE 90 UG/1
2 AEROSOL, METERED RESPIRATORY (INHALATION) EVERY 4 HOURS PRN
Qty: 17 G | Refills: 3 | Status: SHIPPED | OUTPATIENT
Start: 2019-05-15 | End: 2019-07-17

## 2019-05-15 RX ORDER — TRAZODONE HYDROCHLORIDE 100 MG/1
TABLET ORAL
Qty: 180 TABLET | Refills: 1 | Status: SHIPPED | OUTPATIENT
Start: 2019-05-15 | End: 2019-07-17

## 2019-05-15 ASSESSMENT — ASTHMA QUESTIONNAIRES
QUESTION_5 LAST FOUR WEEKS HOW WOULD YOU RATE YOUR ASTHMA CONTROL: SOMEWHAT CONTROLLED
QUESTION_2 LAST FOUR WEEKS HOW OFTEN HAVE YOU HAD SHORTNESS OF BREATH: ONCE A DAY
QUESTION_3 LAST FOUR WEEKS HOW OFTEN DID YOUR ASTHMA SYMPTOMS (WHEEZING, COUGHING, SHORTNESS OF BREATH, CHEST TIGHTNESS OR PAIN) WAKE YOU UP AT NIGHT OR EARLIER THAN USUAL IN THE MORNING: NOT AT ALL
QUESTION_4 LAST FOUR WEEKS HOW OFTEN HAVE YOU USED YOUR RESCUE INHALER OR NEBULIZER MEDICATION (SUCH AS ALBUTEROL): ONE OR TWO TIMES PER DAY
ACT_TOTALSCORE: 17
QUESTION_1 LAST FOUR WEEKS HOW MUCH OF THE TIME DID YOUR ASTHMA KEEP YOU FROM GETTING AS MUCH DONE AT WORK, SCHOOL OR AT HOME: NONE OF THE TIME
ACUTE_EXACERBATION_TODAY: NO

## 2019-05-15 ASSESSMENT — MIFFLIN-ST. JEOR: SCORE: 1361.11

## 2019-05-15 ASSESSMENT — PAIN SCALES - GENERAL: PAINLEVEL: NO PAIN (0)

## 2019-05-15 NOTE — PROGRESS NOTES
"Ref card  SUBJECTIVE:  SUBJECTIVE:  63 year old.The patient has a history of  follow-up of depressive symptoms.    Since last visit the patient has doing well.  Notes from that visit reviewed. PHQ-9 has been reviewed.  Current symptoms include: Depressed Mood   Symptoms that have subjectively improved include: Depressed Mood  Previous and current treatment modalities employed include: Medications   Zoloft (Sertraline)  Patient Active Problem List   Diagnosis     Insomnia     CARDIOVASCULAR SCREENING; LDL GOAL LESS THAN 130     Hypertension goal BP (blood pressure) < 140/90     Anxiety     Severe persistent asthma without complication     History of colonic polyps     Clavicle fracture     Closed displaced fracture of proximal phalanx of left great toe     Chest wall pain     Thoracic back pain     Advanced directives, counseling/discussion     Angioedema, initial encounter      Reviewed health maintenance  OBJECTIVE:  no apparent distress  /86   Pulse 81   Temp 98  F (36.7  C) (Oral)   Resp 18   Ht 1.67 m (5' 5.75\")   Wt 79.8 kg (176 lb)   SpO2 92%   BMI 28.62 kg/m         MENTAL STATUS EXAM:   1. Clinical observations:Patient was clean and was adequately groomed. Patient's emotional presentation was cooperative and anuel/open. Patient spoke clearly and articulately. Patient maintained adequate eye contact and was cooperative in answering questions.  2. Patient appeared to be well-oriented in all spheres with coherent, logical, goal directed and relevent thinking.  3. Thought content: Denies auditory and visual hallucinations or delusions.  4. Affect and mood: Affect is alert and her emotional attitude is described as normal.  5. Sensorium and cognition: Patient was in contact with reality and oriented to place, time, person and situation. patient demonstrated no impairment in immediate, recent, or remote memory. patient's insight was adequate without obvious deficits in intelligence.    ICD-10-CM    1. " Moderate persistent asthma, uncomplicated J45.40 albuterol (PROAIR HFA/PROVENTIL HFA/VENTOLIN HFA) 108 (90 Base) MCG/ACT inhaler     budesonide-formoterol (SYMBICORT) 160-4.5 MCG/ACT Inhaler     citalopram (CELEXA) 20 MG tablet     losartan-hydrochlorothiazide (HYZAAR) 50-12.5 MG tablet     traZODone (DESYREL) 100 MG tablet   2. Visit for screening mammogram Z12.31 albuterol (PROAIR HFA/PROVENTIL HFA/VENTOLIN HFA) 108 (90 Base) MCG/ACT inhaler     budesonide-formoterol (SYMBICORT) 160-4.5 MCG/ACT Inhaler     citalopram (CELEXA) 20 MG tablet     losartan-hydrochlorothiazide (HYZAAR) 50-12.5 MG tablet     traZODone (DESYREL) 100 MG tablet   3. Screening for HIV (human immunodeficiency virus) Z11.4 albuterol (PROAIR HFA/PROVENTIL HFA/VENTOLIN HFA) 108 (90 Base) MCG/ACT inhaler     budesonide-formoterol (SYMBICORT) 160-4.5 MCG/ACT Inhaler     citalopram (CELEXA) 20 MG tablet     losartan-hydrochlorothiazide (HYZAAR) 50-12.5 MG tablet     traZODone (DESYREL) 100 MG tablet   4. Anxiety F41.9 albuterol (PROAIR HFA/PROVENTIL HFA/VENTOLIN HFA) 108 (90 Base) MCG/ACT inhaler     budesonide-formoterol (SYMBICORT) 160-4.5 MCG/ACT Inhaler     citalopram (CELEXA) 20 MG tablet     losartan-hydrochlorothiazide (HYZAAR) 50-12.5 MG tablet     traZODone (DESYREL) 100 MG tablet   5. Essential hypertension I10 albuterol (PROAIR HFA/PROVENTIL HFA/VENTOLIN HFA) 108 (90 Base) MCG/ACT inhaler     budesonide-formoterol (SYMBICORT) 160-4.5 MCG/ACT Inhaler     citalopram (CELEXA) 20 MG tablet     losartan-hydrochlorothiazide (HYZAAR) 50-12.5 MG tablet     traZODone (DESYREL) 100 MG tablet   6. Abdominal obesity E65 albuterol (PROAIR HFA/PROVENTIL HFA/VENTOLIN HFA) 108 (90 Base) MCG/ACT inhaler     budesonide-formoterol (SYMBICORT) 160-4.5 MCG/ACT Inhaler     citalopram (CELEXA) 20 MG tablet     losartan-hydrochlorothiazide (HYZAAR) 50-12.5 MG tablet     traZODone (DESYREL) 100 MG tablet   7. Persistent insomnia G47.00 albuterol (PROAIR  HFA/PROVENTIL HFA/VENTOLIN HFA) 108 (90 Base) MCG/ACT inhaler     budesonide-formoterol (SYMBICORT) 160-4.5 MCG/ACT Inhaler     citalopram (CELEXA) 20 MG tablet     losartan-hydrochlorothiazide (HYZAAR) 50-12.5 MG tablet     traZODone (DESYREL) 100 MG tablet    PLAN:Follow up in 6 months       :

## 2019-05-15 NOTE — PROGRESS NOTES
"SUBJECTIVE:  63 year old.The patient has a complaint of chest tightness.  This started one year ago. Location middle and the right or left arm quality pressure Associated symptoms are shortness of breath .  Brought on by excercise .  Better with rest. ROS no syncope. No missed beats      Reviewed health maintenance  Patient Active Problem List   Diagnosis     Insomnia     CARDIOVASCULAR SCREENING; LDL GOAL LESS THAN 130     Hypertension goal BP (blood pressure) < 140/90     Anxiety     Severe persistent asthma without complication     History of colonic polyps     Clavicle fracture     Closed displaced fracture of proximal phalanx of left great toe     Chest wall pain     Thoracic back pain     Advanced directives, counseling/discussion     Angioedema, initial encounter     Past Medical History:   Diagnosis Date     Generalized anxiety disorder      Hypertension goal BP (blood pressure) < 140/90      Insomnia      Intermittent asthma      Menopausal state      Osteopenia        OBJECTIVE:  no apparent distress  /86   Pulse 81   Temp 98  F (36.7  C) (Oral)   Resp 18   Ht 5' 5.75\" (1.67 m)   Wt 176 lb (79.8 kg)   SpO2 92%   BMI 28.62 kg/m      LUNGS:  CTA B/L, no wheezing or crackles.   Cardiovascular: negative, PMI normal. No lifts, heaves, or thrills. RRR. No murmurs, clicks gallops or rub   Gastrointestinal: Abdomen soft, non-tender. BS normal. No masses, organomegaly   ekg normal    ICD-10-CM    1. Chest pain, unspecified type R07.9 EKG 12-lead complete w/read - Clinics     CARDIOLOGY EVAL ADULT REFERRAL   2. Moderate persistent asthma, uncomplicated J45.40 albuterol (PROAIR HFA/PROVENTIL HFA/VENTOLIN HFA) 108 (90 Base) MCG/ACT inhaler     budesonide-formoterol (SYMBICORT) 160-4.5 MCG/ACT Inhaler   3. Visit for screening mammogram Z12.31    4. Screening for HIV (human immunodeficiency virus) Z11.4    5. Anxiety F41.9 citalopram (CELEXA) 20 MG tablet   6. Essential hypertension I10 " losartan-hydrochlorothiazide (HYZAAR) 50-12.5 MG tablet     EKG 12-lead complete w/read - Clinics   7. Abdominal obesity E65    8. Persistent insomnia G47.00 traZODone (DESYREL) 100 MG tablet    PLAN: await referral

## 2019-05-15 NOTE — NURSING NOTE
"Chief Complaint   Patient presents with     Recheck Medication   needs refills soon     Initial /86   Pulse 81   Temp 98  F (36.7  C) (Oral)   Resp 18   Ht 1.67 m (5' 5.75\")   Wt 79.8 kg (176 lb)   SpO2 92%   BMI 28.62 kg/m   Estimated body mass index is 28.62 kg/m  as calculated from the following:    Height as of this encounter: 1.67 m (5' 5.75\").    Weight as of this encounter: 79.8 kg (176 lb).  Medication Reconciliation: complete  Emily Dey M.A.    "

## 2019-05-16 ENCOUNTER — OFFICE VISIT (OUTPATIENT)
Dept: CARDIOLOGY | Facility: CLINIC | Age: 64
End: 2019-05-16
Payer: COMMERCIAL

## 2019-05-16 VITALS
BODY MASS INDEX: 28.79 KG/M2 | DIASTOLIC BLOOD PRESSURE: 85 MMHG | HEART RATE: 82 BPM | WEIGHT: 177 LBS | SYSTOLIC BLOOD PRESSURE: 131 MMHG | OXYGEN SATURATION: 92 %

## 2019-05-16 DIAGNOSIS — R07.89 ATYPICAL CHEST PAIN: Primary | ICD-10-CM

## 2019-05-16 DIAGNOSIS — R06.09 DOE (DYSPNEA ON EXERTION): ICD-10-CM

## 2019-05-16 PROCEDURE — 99204 OFFICE O/P NEW MOD 45 MIN: CPT | Performed by: INTERNAL MEDICINE

## 2019-05-16 ASSESSMENT — ASTHMA QUESTIONNAIRES: ACT_TOTALSCORE: 17

## 2019-05-16 NOTE — LETTER
5/16/2019      RE: Ludy Ramos  [address]  [place] [address]       Dear Colleague,    Thank you for the opportunity to participate in the care of your patient, Ludy Ramos, at the [facility] AT [facility]. Please see a copy of my visit note below.       SUBJECTIVE:  Ludy Ramos is a 63 year old female who presents for evaluation of chest pain and shortness of breath on exertion.  Patient had these symptoms for over 3 years.  As per  recently gained some weight.  Since then her shortness of breath is worse.  Chest pain is precordial chest pain.  Mostly at rest.  Sometimes at exertion.  At times associated with left arm pain neck pain and jaw pain.  No diaphoresis nausea vomiting.  The symptoms are nonprogressive.  Patient also have significant asthma as per history and use inhalers.  Her cardiac risk factors include hypertension.  Lipids normal.  Patient is a non-smoker and there is no family history of premature coronary artery disease and no diabetes.  Recently started going to the gym and started on exercise program.  She uses only treadmill at a low speed.  Patient Active Problem List    Diagnosis Date Noted     Angioedema, initial encounter 10/02/2017     Priority: Medium     Advanced directives, counseling/discussion 03/30/2017     Priority: Medium     Thoracic back pain 08/10/2015     Priority: Medium     Chest wall pain 06/19/2015     Priority: Medium     Clavicle fracture 12/01/2014     Priority: Medium     Closed displaced fracture of proximal phalanx of left great toe 12/01/2014     Priority: Medium     History of colonic polyps 05/08/2014     Priority: Medium     Severe persistent asthma without complication 07/03/2013     Priority: Medium     Anxiety 02/24/2011     Priority: Medium     Hypertension goal BP (blood pressure) < 140/90 12/10/2010     Priority: Medium     CARDIOVASCULAR  SCREENING; LDL GOAL LESS THAN 130 10/31/2010     Priority: Medium     Insomnia      Priority: Medium    .  Current Outpatient Medications   Medication Sig     albuterol (PROAIR HFA/PROVENTIL HFA/VENTOLIN HFA) 108 (90 Base) MCG/ACT inhaler Inhale 2 puffs into the lungs every 4 hours as needed for shortness of breath / dyspnea     budesonide-formoterol (SYMBICORT) 160-4.5 MCG/ACT Inhaler Inhale 2 puffs into the lungs 2 times daily     citalopram (CELEXA) 20 MG tablet Take 1 tablet (20 mg) by mouth daily     losartan-hydrochlorothiazide (HYZAAR) 50-12.5 MG tablet Take 1 tablet by mouth daily     traZODone (DESYREL) 100 MG tablet TAKE 2 TABLETS BY MOUTH AT BEDTIME. APPOINTMENT NEEDED FOR FURTHER REFILLS     No current facility-administered medications for this visit.      Past Medical History:   Diagnosis Date     Generalized anxiety disorder      Hypertension goal BP (blood pressure) < 140/90      Insomnia      Intermittent asthma      Menopausal state      Osteopenia      Past Surgical History:   Procedure Laterality Date     COLONOSCOPY       COLONOSCOPY  5/20/2014    Procedure: COLONOSCOPY;  Surgeon: Jean Hastings MD;  Location: [facility]     COLONOSCOPY  5/20/2014    Procedure: COMBINED COLONOSCOPY, SINGLE BIOPSY/POLYPECTOMY BY BIOPSY;  Surgeon: Jean Hastings MD;  Location: [facility]     COLONOSCOPY N/A 5/5/2017    Procedure: COMBINED COLONOSCOPY, SINGLE OR MULTIPLE BIOPSY/POLYPECTOMY BY BIOPSY;;  Surgeon: Duane, William Charles, MD;  Location: [facility] OR     COLONOSCOPY WITH CO2 INSUFFLATION N/A 5/5/2017    Procedure: COLONOSCOPY WITH CO2 INSUFFLATION;  colonoscopy, History of colonic polyps  BMI 25.74  [email or web address], fax: [phone];  Surgeon: Duane, William Charles, MD;  Location: [facility] OR     FINGER SURGERY      Left 4th digit     HYSTERECTOMY, JONAS       SURGICAL HISTORY OF -       Right 2nd digit     TONSILLECTOMY       No Known Allergies  Social History     Socioeconomic History     Marital  status:      Spouse name: Not on file     Number of children: Not on file     Years of education: Not on file     Highest education level: Not on file   Occupational History     Not on file   Social Needs     Financial resource strain: Not on file     Food insecurity:     Worry: Not on file     Inability: Not on file     Transportation needs:     Medical: Not on file     Non-medical: Not on file   Tobacco Use     Smoking status: Former Smoker     Packs/day: 0.00     Years: 20.00     Pack years: 0.00     Start date: 1980     Last attempt to quit: 2001     Years since quittin.4     Smokeless tobacco: Never Used   Substance and Sexual Activity     Alcohol use: Yes     Alcohol/week: 6.0 oz     Drug use: No     Sexual activity: Yes     Partners: Male     Birth control/protection: Surgical     Comment: Hyst   Lifestyle     Physical activity:     Days per week: Not on file     Minutes per session: Not on file     Stress: Not on file   Relationships     Social connections:     Talks on phone: Not on file     Gets together: Not on file     Attends Latter day service: Not on file     Active member of club or organization: Not on file     Attends meetings of clubs or organizations: Not on file     Relationship status: Not on file     Intimate partner violence:     Fear of current or ex partner: Not on file     Emotionally abused: Not on file     Physically abused: Not on file     Forced sexual activity: Not on file   Other Topics Concern     Parent/sibling w/ CABG, MI or angioplasty before 65F 55M? No   Social History Narrative     Not on file     Family History   Problem Relation Age of Onset     Respiratory Mother         copd     Respiratory Father         empysema     Melanoma No family hx of           REVIEW OF SYSTEMS:  General: negative, fever, chills, night sweats  Skin: negative, acne, rash and scaling  Eyes: negative, double vision, eye pain and photophobia  Ears/Nose/Throat: negative, nasal  congestion and purulent rhinorrhea  Respiratory: No cough, No hemoptysis and negative  Cardiovascular: negative, palpitations, tachycardia, irregular heart beat, paroxysmal nocturnal dyspnea and orthopnea  Gastrointestinal: negative, dysphagia, nausea and vomiting  Genitourinary: negative, nocturia, dysuria and frequency  Musculoskeletal: negative, arthritis, joint pain and joint swelling  Neurologic: negative, headaches, syncope, stroke and seizures  Psychiatric: negative, nervous breakdown, thoughts of self-harm and thoughts of hurting someone else  Hematologic/Lymphatic/Immunologic: negative, bleeding disorder, chills and fever  Endocrine: negative, cold intolerance, heat intolerance and hot flashes       OBJECTIVE:  Blood pressure 131/85, pulse 82, weight 80.3 kg (177 lb), SpO2 92 %, not currently breastfeeding.  General Appearance: healthy, alert and no distress  Head: Normocephalic. No masses, lesions, tenderness or abnormalities  Eyes: conjuctiva clear, PERRL, EOM intact  Ears: External ears normal. Canals clear. TM's normal.  Nose: Nares normal  Mouth: normal  Neck: Supple, no cervical adenopathy, no thyromegaly  Lungs: clear to auscultation  Cardiac: regular rate and rhythm, normal S1 and S2, no murmur  Abdomen: Soft, nontender.  Normal bowel sounds.  No hepatosplenomegaly or abnormal masses  Extremities: no peripheral edema, peripheral pulses normal  Musculoskeletal: negative  Neurological: Cranial nerves 2-12 intact, motor strength intact       ASSESSMENT/PLAN:  Patient with long-standing history of chest pain and shortness of breath.  Recently gained some weight and since shortness of breath is worse.  Chest pain both at rest and with activity.  No associated symptoms.  Cardiac risk factor includes only hypertension.  Lipids are normal.  No diabetes.  No premature coronary artery disease in family and patient never smoked.  Reviewed recent EKG normal sinus rhythm normal.  Due to exertional symptoms and  atypical chest pain will plan for a dobutamine stress echocardiogram.  Currently patient do not have any wheezing or any other symptoms to suggest significant asthma.  Advised patient that if the dobutamine stress echo is normal patient will be continuing her exercise program and increasing the intensity of exercise.  Per orders.   Return to Clinic PRN.    Please do not hesitate to contact me if you have any questions/concerns.     Sincerely,     CLIFTON Bullock MD

## 2019-05-16 NOTE — PATIENT INSTRUCTIONS
Thank you for coming to the [facility]; please note the following instructions:    1. Dobutamine stress echocardiogram. please see following pages for appointment detail information.    2. Follow up as needed        If you have any questions regarding your visit please contact your care team:     Cardiology  Telephone Number   Wendy HI, RN  Ernestina DOOLEY, RN   Aidee ROMERO, RMA  Naima RICHARDS, RMA  Wilfredo GIBSON, [facility]   [phone] (option 1)   For scheduling appts:     [phone] (select option 1)       For the Device Clinic (Pacemakers and ICD's)  RN's :  Nehal Woodward   During business hours: [phone]    *After business hours:  [phone] (select option 4)      Normal test result notifications will be released via [employer] or mailed within 7 business days.  All other test results, will be communicated via telephone once reviewed by your cardiologist.    If you need a medication refill please contact your pharmacy.  Please allow 3 business days for your refill to be completed.    As always, thank you for trusting us with your health care needs!

## 2019-05-16 NOTE — PROGRESS NOTES
SUBJECTIVE:  Ludy Ramos is a 63 year old female who presents for evaluation of chest pain and shortness of breath on exertion.  Patient had these symptoms for over 3 years.  As per  recently gained some weight.  Since then her shortness of breath is worse.  Chest pain is precordial chest pain.  Mostly at rest.  Sometimes at exertion.  At times associated with left arm pain neck pain and jaw pain.  No diaphoresis nausea vomiting.  The symptoms are nonprogressive.  Patient also have significant asthma as per history and use inhalers.  Her cardiac risk factors include hypertension.  Lipids normal.  Patient is a non-smoker and there is no family history of premature coronary artery disease and no diabetes.  Recently started going to the gym and started on exercise program.  She uses only treadmill at a low speed.  Patient Active Problem List    Diagnosis Date Noted     Angioedema, initial encounter 10/02/2017     Priority: Medium     Advanced directives, counseling/discussion 03/30/2017     Priority: Medium     Thoracic back pain 08/10/2015     Priority: Medium     Chest wall pain 06/19/2015     Priority: Medium     Clavicle fracture 12/01/2014     Priority: Medium     Closed displaced fracture of proximal phalanx of left great toe 12/01/2014     Priority: Medium     History of colonic polyps 05/08/2014     Priority: Medium     Severe persistent asthma without complication 07/03/2013     Priority: Medium     Anxiety 02/24/2011     Priority: Medium     Hypertension goal BP (blood pressure) < 140/90 12/10/2010     Priority: Medium     CARDIOVASCULAR SCREENING; LDL GOAL LESS THAN 130 10/31/2010     Priority: Medium     Insomnia      Priority: Medium    .  Current Outpatient Medications   Medication Sig     albuterol (PROAIR HFA/PROVENTIL HFA/VENTOLIN HFA) 108 (90 Base) MCG/ACT inhaler Inhale 2 puffs into the lungs every 4 hours as needed for shortness of breath / dyspnea     budesonide-formoterol (SYMBICORT)  160-4.5 MCG/ACT Inhaler Inhale 2 puffs into the lungs 2 times daily     citalopram (CELEXA) 20 MG tablet Take 1 tablet (20 mg) by mouth daily     losartan-hydrochlorothiazide (HYZAAR) 50-12.5 MG tablet Take 1 tablet by mouth daily     traZODone (DESYREL) 100 MG tablet TAKE 2 TABLETS BY MOUTH AT BEDTIME. APPOINTMENT NEEDED FOR FURTHER REFILLS     No current facility-administered medications for this visit.      Past Medical History:   Diagnosis Date     Generalized anxiety disorder      Hypertension goal BP (blood pressure) < 140/90      Insomnia      Intermittent asthma      Menopausal state      Osteopenia      Past Surgical History:   Procedure Laterality Date     COLONOSCOPY       COLONOSCOPY  5/20/2014    Procedure: COLONOSCOPY;  Surgeon: Jean Hastings MD;  Location: [facility]     COLONOSCOPY  5/20/2014    Procedure: COMBINED COLONOSCOPY, SINGLE BIOPSY/POLYPECTOMY BY BIOPSY;  Surgeon: Jean Hastings MD;  Location: [facility]     COLONOSCOPY N/A 5/5/2017    Procedure: COMBINED COLONOSCOPY, SINGLE OR MULTIPLE BIOPSY/POLYPECTOMY BY BIOPSY;;  Surgeon: Duane, William Charles, MD;  Location: [facility]     COLONOSCOPY WITH CO2 INSUFFLATION N/A 5/5/2017    Procedure: COLONOSCOPY WITH CO2 INSUFFLATION;  colonoscopy, History of colonic polyps  BMI 25.74  [facility]: [phone];  Surgeon: Duane, William Charles, MD;  Location: [facility]     FINGER SURGERY      Left 4th digit     HYSTERECTOMY, JONAS       SURGICAL HISTORY OF -       Right 2nd digit     TONSILLECTOMY       No Known Allergies  Social History     Socioeconomic History     Marital status:      Spouse name: Not on file     Number of children: Not on file     Years of education: Not on file     Highest education level: Not on file   Occupational History     Not on file   Social Needs     Financial resource strain: Not on file     Food insecurity:     Worry: Not on file     Inability: Not on file     Transportation needs:     Medical:  Not on file     Non-medical: Not on file   Tobacco Use     Smoking status: Former Smoker     Packs/day: 0.00     Years: 20.00     Pack years: 0.00     Start date: 1980     Last attempt to quit: 2001     Years since quittin.4     Smokeless tobacco: Never Used   Substance and Sexual Activity     Alcohol use: Yes     Alcohol/week: 6.0 oz     Drug use: No     Sexual activity: Yes     Partners: Male     Birth control/protection: Surgical     Comment: Hyst   Lifestyle     Physical activity:     Days per week: Not on file     Minutes per session: Not on file     Stress: Not on file   Relationships     Social connections:     Talks on phone: Not on file     Gets together: Not on file     Attends Jainism service: Not on file     Active member of club or organization: Not on file     Attends meetings of clubs or organizations: Not on file     Relationship status: Not on file     Intimate partner violence:     Fear of current or ex partner: Not on file     Emotionally abused: Not on file     Physically abused: Not on file     Forced sexual activity: Not on file   Other Topics Concern     Parent/sibling w/ CABG, MI or angioplasty before 65F 55M? No   Social History Narrative     Not on file     Family History   Problem Relation Age of Onset     Respiratory Mother         copd     Respiratory Father         empysema     Melanoma No family hx of           REVIEW OF SYSTEMS:  General: negative, fever, chills, night sweats  Skin: negative, acne, rash and scaling  Eyes: negative, double vision, eye pain and photophobia  Ears/Nose/Throat: negative, nasal congestion and purulent rhinorrhea  Respiratory: No cough, No hemoptysis and negative  Cardiovascular: negative, palpitations, tachycardia, irregular heart beat, paroxysmal nocturnal dyspnea and orthopnea  Gastrointestinal: negative, dysphagia, nausea and vomiting  Genitourinary: negative, nocturia, dysuria and frequency  Musculoskeletal: negative, arthritis, joint  pain and joint swelling  Neurologic: negative, headaches, syncope, stroke and seizures  Psychiatric: negative, nervous breakdown, thoughts of self-harm and thoughts of hurting someone else  Hematologic/Lymphatic/Immunologic: negative, bleeding disorder, chills and fever  Endocrine: negative, cold intolerance, heat intolerance and hot flashes       OBJECTIVE:  Blood pressure 131/85, pulse 82, weight 80.3 kg (177 lb), SpO2 92 %, not currently breastfeeding.  General Appearance: healthy, alert and no distress  Head: Normocephalic. No masses, lesions, tenderness or abnormalities  Eyes: conjuctiva clear, PERRL, EOM intact  Ears: External ears normal. Canals clear. TM's normal.  Nose: Nares normal  Mouth: normal  Neck: Supple, no cervical adenopathy, no thyromegaly  Lungs: clear to auscultation  Cardiac: regular rate and rhythm, normal S1 and S2, no murmur  Abdomen: Soft, nontender.  Normal bowel sounds.  No hepatosplenomegaly or abnormal masses  Extremities: no peripheral edema, peripheral pulses normal  Musculoskeletal: negative  Neurological: Cranial nerves 2-12 intact, motor strength intact       ASSESSMENT/PLAN:  Patient with long-standing history of chest pain and shortness of breath.  Recently gained some weight and since shortness of breath is worse.  Chest pain both at rest and with activity.  No associated symptoms.  Cardiac risk factor includes only hypertension.  Lipids are normal.  No diabetes.  No premature coronary artery disease in family and patient never smoked.  Reviewed recent EKG normal sinus rhythm normal.  Due to exertional symptoms and atypical chest pain will plan for a dobutamine stress echocardiogram.  Currently patient do not have any wheezing or any other symptoms to suggest significant asthma.  Advised patient that if the dobutamine stress echo is normal patient will be continuing her exercise program and increasing the intensity of exercise.  Per orders.   Return to Clinic PRN.

## 2019-05-16 NOTE — NURSING NOTE
"Chief Complaint   Patient presents with     Chest Pain     NEW Dr. Gaytan 5/16 chest pain and shortness of breath. Intermittent starting 1 year ago. Present with exertion. 5/15/19 EKG NSR. Hx of HTN. Pt reports on and off chest pains, pains down arms and jaw, SOB with exertion, rapid heart rate, fatigue.       Initial /85 (BP Location: Right arm, Patient Position: Chair, Cuff Size: Adult Regular)   Pulse 82   Wt 80.3 kg (177 lb)   SpO2 92%   BMI 28.79 kg/m   Estimated body mass index is 28.79 kg/m  as calculated from the following:    Height as of 5/15/19: 1.67 m (5' 5.75\").    Weight as of this encounter: 80.3 kg (177 lb)..  BP completed using cuff size: regular    Naima Dickson MA    "

## 2019-05-23 ENCOUNTER — ANCILLARY PROCEDURE (OUTPATIENT)
Dept: GENERAL RADIOLOGY | Facility: CLINIC | Age: 64
End: 2019-05-23
Attending: ORTHOPAEDIC SURGERY
Payer: COMMERCIAL

## 2019-05-23 ENCOUNTER — ANCILLARY PROCEDURE (OUTPATIENT)
Dept: CARDIOLOGY | Facility: CLINIC | Age: 64
End: 2019-05-23
Attending: INTERNAL MEDICINE
Payer: COMMERCIAL

## 2019-05-23 ENCOUNTER — OFFICE VISIT (OUTPATIENT)
Dept: ORTHOPEDICS | Facility: CLINIC | Age: 64
End: 2019-05-23
Payer: COMMERCIAL

## 2019-05-23 VITALS — OXYGEN SATURATION: 94 % | HEART RATE: 77 BPM | SYSTOLIC BLOOD PRESSURE: 160 MMHG | DIASTOLIC BLOOD PRESSURE: 82 MMHG

## 2019-05-23 DIAGNOSIS — M75.82 ROTATOR CUFF TENDINITIS, LEFT: Primary | ICD-10-CM

## 2019-05-23 DIAGNOSIS — M75.42 IMPINGEMENT SYNDROME OF LEFT SHOULDER: ICD-10-CM

## 2019-05-23 DIAGNOSIS — R07.89 ATYPICAL CHEST PAIN: Primary | ICD-10-CM

## 2019-05-23 PROCEDURE — 73030 X-RAY EXAM OF SHOULDER: CPT | Mod: LT

## 2019-05-23 PROCEDURE — 93352 ADMIN ECG CONTRAST AGENT: CPT | Performed by: INTERNAL MEDICINE

## 2019-05-23 PROCEDURE — 93350 STRESS TTE ONLY: CPT | Performed by: INTERNAL MEDICINE

## 2019-05-23 PROCEDURE — 93321 DOPPLER ECHO F-UP/LMTD STD: CPT | Performed by: INTERNAL MEDICINE

## 2019-05-23 PROCEDURE — 99244 OFF/OP CNSLTJ NEW/EST MOD 40: CPT | Performed by: ORTHOPAEDIC SURGERY

## 2019-05-23 PROCEDURE — 93018 CV STRESS TEST I&R ONLY: CPT | Performed by: INTERNAL MEDICINE

## 2019-05-23 PROCEDURE — 93325 DOPPLER ECHO COLOR FLOW MAPG: CPT | Performed by: INTERNAL MEDICINE

## 2019-05-23 PROCEDURE — 93017 CV STRESS TEST TRACING ONLY: CPT | Performed by: INTERNAL MEDICINE

## 2019-05-23 PROCEDURE — 93016 CV STRESS TEST SUPVJ ONLY: CPT | Performed by: INTERNAL MEDICINE

## 2019-05-23 RX ORDER — DOBUTAMINE HYDROCHLORIDE 200 MG/100ML
2.5-2 INJECTION INTRAVENOUS CONTINUOUS
Status: ACTIVE | OUTPATIENT
Start: 2019-05-23

## 2019-05-23 RX ORDER — ATROPINE SULFATE 0.4 MG/ML
0.4 AMPUL (ML) INJECTION ONCE
Status: DISCONTINUED | OUTPATIENT
Start: 2019-05-23 | End: 2020-08-31

## 2019-05-23 RX ORDER — METOPROLOL TARTRATE 1 MG/ML
5 INJECTION, SOLUTION INTRAVENOUS EVERY 5 MIN PRN
Status: ACTIVE | OUTPATIENT
Start: 2019-05-23

## 2019-05-23 RX ADMIN — DOBUTAMINE HYDROCHLORIDE 10 MCG/KG/MIN: 200 INJECTION INTRAVENOUS at 13:54

## 2019-05-23 RX ADMIN — METOPROLOL TARTRATE 1 MG: 1 INJECTION, SOLUTION INTRAVENOUS at 14:06

## 2019-05-23 RX ADMIN — Medication 5 ML: at 14:15

## 2019-05-23 ASSESSMENT — PAIN SCALES - GENERAL: PAINLEVEL: MODERATE PAIN (5)

## 2019-05-23 NOTE — LETTER
5/23/2019         RE: Ludy Ramos  [address]  [place] [address]        Dear Colleague,    Thank you for referring your patient, Ludy Ramos, to the [facility]. Please see a copy of my visit note below.    xr shoudler left      SUBJECTIVE:  Ludy Ramos is a 63 year old female who is seen in consultation at the request of Dr. Canela for left shoulder pain that has been present approximately 1-2 months. At that time, she was at the gym and had sharp left shoulder pain with pull down exercises. Since that time, she has had continued pain. Today, patient's pain is located over the left lateral shoulder. She has constant pain but symptoms are worsened with overhead movements, internal rotation motions, and lifting away from body. Pain with sleeping. Denies any cracking or popping. For treatment, she has tried Advil and Flexeril, both were not helpful. Has not had physical therapy, nor ices. Other symptoms: left lateral shoulder pain  Associated symptoms: none    Treatment up to this point: Advil and Flexeril    Ortho PMH: none    Review of Systems:  Constitutional:  NEGATIVE for fever, chills, change in weight  Integumentary/Skin:  NEGATIVE for worrisome rashes, moles or lesions  Eyes:  NEGATIVE for vision changes or irritation  ENT/Mouth:  NEGATIVE for ear, mouth and throat problems  Resp:  NEGATIVE for significant cough or SOB  Breast:  NEGATIVE for masses, tenderness or discharge  CV:  NEGATIVE for chest pain, palpitations or peripheral edema  GI:  NEGATIVE for nausea, abdominal pain, heartburn, or change in bowel habits  :  Negative   Musculoskeletal:  See HPI above  Neuro:  NEGATIVE for weakness, dizziness or paresthesias  Endocrine:  NEGATIVE for temperature intolerance, skin/hair changes  Heme/allergy/immune:  NEGATIVE for bleeding problems  Psychiatric:  NEGATIVE for changes in mood or affect    Past Medical History:   Past Medical History:   Diagnosis Date      Generalized anxiety disorder      Hypertension goal BP (blood pressure) < 140/90      Insomnia      Intermittent asthma      Menopausal state      Osteopenia    Has Asthma and some COPD    Past Surgical History:   Past Surgical History:   Procedure Laterality Date     COLONOSCOPY       COLONOSCOPY  2014    Procedure: COLONOSCOPY;  Surgeon: Jean Hastings MD;  Location: [facility]     COLONOSCOPY  2014    Procedure: COMBINED COLONOSCOPY, SINGLE BIOPSY/POLYPECTOMY BY BIOPSY;  Surgeon: Jean Hastings MD;  Location: [facility]     COLONOSCOPY N/A 2017    Procedure: COMBINED COLONOSCOPY, SINGLE OR MULTIPLE BIOPSY/POLYPECTOMY BY BIOPSY;;  Surgeon: Duane, William Charles, MD;  Location: [facility]     COLONOSCOPY WITH CO2 INSUFFLATION N/A 2017    Procedure: COLONOSCOPY WITH CO2 INSUFFLATION;  colonoscopy, History of colonic polyps  BMI 25.74  [employer], fax: [phone];  Surgeon: Duane, William Charles, MD;  Location: [facility]     FINGER SURGERY      Left 4th digit     HYSTERECTOMY, JONAS       SURGICAL HISTORY OF -       Right 2nd digit     TONSILLECTOMY       Family History:   Family History   Problem Relation Age of Onset     Respiratory Mother         copd     Respiratory Father         empysema     Melanoma No family hx of      Social History:   Social History     Tobacco Use     Smoking status: Former Smoker     Packs/day: 0.00     Years: 20.00     Pack years: 0.00     Start date: 1980     Last attempt to quit: 2001     Years since quittin.4     Smokeless tobacco: Never Used   Substance Use Topics     Alcohol use: Yes     Alcohol/week: 6.0 oz     This document serves as a record of the services and decisions personally performed and made by CECILIA Heaton MD. It was created on his behalf by Kinsey Foley, a trained medical scribe. The creation of this document is based the provider's statements to the medical scribe.    Scribjamison Foley 9:47 AM 2019       OBJECTIVE:  Physical Exam:  /82 (BP Location: Right arm, Patient Position: Sitting, Cuff Size: Adult Regular)   Pulse 77   SpO2 94%   General Appearance: healthy, alert and no distress   Skin: no suspicious lesions or rashes  Neuro: Normal strength and tone, mentation intact and speech normal  Vascular: good pulses, and capillary refill   Lymph: no lymphadenopathy   Psych:  mentation appears normal and affect normal/bright  Resp: no increased work of breathing    Neck Exam:  Cervical range of motion: slight limitation of lateral tilt to left and does not cause neck pain or reproduce shoulder pain.  Sensory deficits: none  Motor deficits: none  DTR's: normal    Left Shoulder Exam:  Shoulder Inspection: no swelling, bruising, discoloration, or obvious deformity or asymmetry, no scapular winging or rotator cuff atrophy  Tender: greater tuberosity   Non-tender: AC joint  Range of Motion:   Active: forward flexion: 145*, internal rotation: T10*   Passive: forward flexion: full*, external rotation: 75  Mild crepitations with range of motion   Strength: forward flexion: 5-/5*, external rotation: trace weakness, Liftoff: Able  Impingement: 221  Special tests: none tested    X-rays:  Obtained x-rays of the left shoulder: 3-views, obtained 5/23/2019, and were reviewed in the office with the patient and show a type 2 acromion, otherwise normal.     ASSESSMENT:  Encounter Diagnoses   Name Primary?     Rotator cuff tendinitis, left Yes     Impingement syndrome of left shoulder     possible small RCT    PLAN:  We discussed treatment options for rotator cuff tendonitis. I recommended she consider an MRI for confirmation of rotator cuff tear/further investigation of shoulder. Also discussed physical therapy for strengthening and range of motion improvement. A cortisone injection could be beneficial for pain, risks and perceived benefits were discussed. At this time, patient is interested in physical therapy. Physical  therapy was ordered today. If she is not doing well with physical therapy and would like to have an MRI, she should give my office a call or send a MyChart message, so we can order that.  Can rtc as well.    Return to clinic: as needed     The information in this document, created by a scribe for me, accurately reflects the services I personally performed and the decisions made by me. I have reviewed and approved this document for accuracy.        CECILIA Heaton MD  Dept. Orthopedic Surgery  [facility]        Again, thank you for allowing me to participate in the care of your patient.        Sincerely,        Gee Heaton MD

## 2019-05-23 NOTE — PROGRESS NOTES
SUBJECTIVE:  Ludy Ramos is a 63 year old female who is seen in consultation at the request of Dr. Canela for left shoulder pain that has been present approximately 1-2 months. At that time, she was at the gym and had sharp left shoulder pain with pull down exercises. Since that time, she has had continued pain. Today, patient's pain is located over the left lateral shoulder. She has constant pain but symptoms are worsened with overhead movements, internal rotation motions, and lifting away from body. Pain with sleeping. Denies any cracking or popping. For treatment, she has tried Advil and Flexeril, both were not helpful. Has not had physical therapy, nor ices. Other symptoms: left lateral shoulder pain  Associated symptoms: none    Treatment up to this point: Advil and Flexeril    Ortho PMH: none    Review of Systems:  Constitutional:  NEGATIVE for fever, chills, change in weight  Integumentary/Skin:  NEGATIVE for worrisome rashes, moles or lesions  Eyes:  NEGATIVE for vision changes or irritation  ENT/Mouth:  NEGATIVE for ear, mouth and throat problems  Resp:  NEGATIVE for significant cough or SOB  Breast:  NEGATIVE for masses, tenderness or discharge  CV:  NEGATIVE for chest pain, palpitations or peripheral edema  GI:  NEGATIVE for nausea, abdominal pain, heartburn, or change in bowel habits  :  Negative   Musculoskeletal:  See HPI above  Neuro:  NEGATIVE for weakness, dizziness or paresthesias  Endocrine:  NEGATIVE for temperature intolerance, skin/hair changes  Heme/allergy/immune:  NEGATIVE for bleeding problems  Psychiatric:  NEGATIVE for changes in mood or affect    Past Medical History:   Past Medical History:   Diagnosis Date     Generalized anxiety disorder      Hypertension goal BP (blood pressure) < 140/90      Insomnia      Intermittent asthma      Menopausal state      Osteopenia    Has Asthma and some COPD    Past Surgical History:   Past Surgical History:   Procedure Laterality Date      COLONOSCOPY       COLONOSCOPY  2014    Procedure: COLONOSCOPY;  Surgeon: Jean Hastings MD;  Location: [facility]     COLONOSCOPY  2014    Procedure: COMBINED COLONOSCOPY, SINGLE BIOPSY/POLYPECTOMY BY BIOPSY;  Surgeon: Jean Hastings MD;  Location: [facility]     COLONOSCOPY N/A 2017    Procedure: COMBINED COLONOSCOPY, SINGLE OR MULTIPLE BIOPSY/POLYPECTOMY BY BIOPSY;;  Surgeon: Duane, William Charles, MD;  Location: [facility]     COLONOSCOPY WITH CO2 INSUFFLATION N/A 2017    Procedure: COLONOSCOPY WITH CO2 INSUFFLATION;  colonoscopy, History of colonic polyps  BMI 25.74  [employer], fax: [phone];  Surgeon: Duane, William Charles, MD;  Location: [facility]     FINGER SURGERY      Left 4th digit     HYSTERECTOMY, JONAS       SURGICAL HISTORY OF -       Right 2nd digit     TONSILLECTOMY       Family History:   Family History   Problem Relation Age of Onset     Respiratory Mother         copd     Respiratory Father         empysema     Melanoma No family hx of      Social History:   Social History     Tobacco Use     Smoking status: Former Smoker     Packs/day: 0.00     Years: 20.00     Pack years: 0.00     Start date: 1980     Last attempt to quit: 2001     Years since quittin.4     Smokeless tobacco: Never Used   Substance Use Topics     Alcohol use: Yes     Alcohol/week: 6.0 oz     This document serves as a record of the services and decisions personally performed and made by CECILIA Heaton MD. It was created on his behalf by Kinsey Foley, a trained medical scribe. The creation of this document is based the provider's statements to the medical scribe.    Scribe Kinsey Foley 9:47 AM 2019      OBJECTIVE:  Physical Exam:  /82 (BP Location: Right arm, Patient Position: Sitting, Cuff Size: Adult Regular)   Pulse 77   SpO2 94%   General Appearance: healthy, alert and no distress   Skin: no suspicious lesions or rashes  Neuro: Normal strength and tone,  mentation intact and speech normal  Vascular: good pulses, and capillary refill   Lymph: no lymphadenopathy   Psych:  mentation appears normal and affect normal/bright  Resp: no increased work of breathing    Neck Exam:  Cervical range of motion: slight limitation of lateral tilt to left and does not cause neck pain or reproduce shoulder pain.  Sensory deficits: none  Motor deficits: none  DTR's: normal    Left Shoulder Exam:  Shoulder Inspection: no swelling, bruising, discoloration, or obvious deformity or asymmetry, no scapular winging or rotator cuff atrophy  Tender: greater tuberosity   Non-tender: AC joint  Range of Motion:   Active: forward flexion: 145*, internal rotation: T10*   Passive: forward flexion: full*, external rotation: 75  Mild crepitations with range of motion   Strength: forward flexion: 5-/5*, external rotation: trace weakness, Liftoff: Able  Impingement: 221  Special tests: none tested    X-rays:  Obtained x-rays of the left shoulder: 3-views, obtained 5/23/2019, and were reviewed in the office with the patient and show a type 2 acromion, otherwise normal.     ASSESSMENT:  Encounter Diagnoses   Name Primary?     Rotator cuff tendinitis, left Yes     Impingement syndrome of left shoulder     possible small RCT    PLAN:  We discussed treatment options for rotator cuff tendonitis. I recommended she consider an MRI for confirmation of rotator cuff tear/further investigation of shoulder. Also discussed physical therapy for strengthening and range of motion improvement. A cortisone injection could be beneficial for pain, risks and perceived benefits were discussed. At this time, patient is interested in physical therapy. Physical therapy was ordered today. If she is not doing well with physical therapy and would like to have an MRI, she should give my office a call or send a [employer] message, so we can order that.  Can rtc as well.    Return to clinic: as needed     The information in this  document, created by a scribe for me, accurately reflects the services I personally performed and the decisions made by me. I have reviewed and approved this document for accuracy.        CECILIA Heaton MD  Dept. Orthopedic Surgery  [facility]

## 2019-06-04 ENCOUNTER — TELEPHONE (OUTPATIENT)
Dept: FAMILY MEDICINE | Facility: CLINIC | Age: 64
End: 2019-06-04

## 2019-06-04 NOTE — TELEPHONE ENCOUNTER
Panel Management Review      Patient has the following on her problem list:   Hypertension   Last three blood pressure readings:  BP Readings from Last 3 Encounters:   05/23/19 160/82   05/16/19 131/85   05/15/19 166/86     Blood pressure: FAILED    HTN Guidelines:  Less than 140/90      Composite cancer screening  Chart review shows that this patient is due/due soon for the following None  Summary:    Patient is due/failing the following:   AAP, ACT and BP CHECK    Action needed:   Patient needs office visit for bp and asthma test.    Type of outreach:    Sent letter.    Questions for provider review:    None                                                                                                                                    Emily Dey M.A.       Chart routed to n/a .

## 2019-06-04 NOTE — LETTER
[facility]  [address] [facility] [address]  Phone: [phone]          Ludy Ramos  [address]  [place] [facility] [address]          June 4, 2019          DearLudy Ramos      Our records indicate that you have not scheduled for a(n)Asthma check  and Blood pressure check  which was recommended by your health care team. Monitoring and managing your preventative and chronic health conditions are very important to us.       If you have received your health care elsewhere, please provide us with that information so it can be documented in your chart.    Please call [phone] or message us through your [employer] account to schedule an appointment or provide information for your chart.     We look forward to seeing you and working with you on your health care needs.     Sincerely,   [facility]          *If you have already scheduled an appointment, please disregard this reminder

## 2019-07-05 ENCOUNTER — TELEPHONE (OUTPATIENT)
Dept: FAMILY MEDICINE | Facility: CLINIC | Age: 64
End: 2019-07-05

## 2019-07-05 DIAGNOSIS — I10 ESSENTIAL HYPERTENSION: ICD-10-CM

## 2019-07-05 NOTE — TELEPHONE ENCOUNTER
Routing refill request to provider for review/approval because:  Recent BPs have been above pt established goal. Pt is scheduled with PCP on 7/17/19.   Please refill existing Hyzaar order until 7/17/19 visit, or advise if dose adjustment or BP recheck should be done prior to 7/17/19 office visit.      Requested Prescriptions   Pending Prescriptions Disp Refills     losartan-hydrochlorothiazide (HYZAAR) 50-12.5 MG tablet [Pharmacy Med Name: LOSARTAN/HCT 50-12.5MG TAB] 30 tablet 0     Sig: TAKE 1 TABLET BY MOUTH ONCE DAILY       Angiotensin-II Receptors Failed - 7/5/2019  4:17 PM        Failed - Blood pressure under 140/90 in past 12 months     BP Readings from Last 3 Encounters:   05/23/19 160/82   05/16/19 131/85   05/15/19 166/86             Passed - Normal serum creatinine on file in past 12 months     Recent Labs   Lab Test 05/13/19  0958   CR 0.80           Passed - Normal serum potassium on file in past 12 months     Recent Labs   Lab Test 05/13/19  0958   POTASSIUM 4.3        Faustina Carter, ZIAN, RN

## 2019-07-05 NOTE — TELEPHONE ENCOUNTER
Reason for Call:  Other prescription    Detailed comments: Patient sent a message via [employer] stating that they will be out of their BP medication on 07/08/2019. Dr. Canela did not have any openings for her to be seen before this. I have scheduled the patient an appointment with [facility] on 07/17/2019 at 3:00pm. Please call patient if she can get a refill to get her through to this visit.     Phone Number Patient can be reached at: Home number on file [phone] (home)    Best Time: Any    Can we leave a detailed message on this number? YES    Call taken on 7/5/2019 at 4:18 PM by Kiya Yanes

## 2019-07-05 NOTE — TELEPHONE ENCOUNTER
See 7/5/19 Refill encounter which was routed to provider for approval and to advise regarding recent elevated BP.  ZIA AdkinsN, RN

## 2019-07-08 RX ORDER — LOSARTAN POTASSIUM AND HYDROCHLOROTHIAZIDE 12.5; 5 MG/1; MG/1
TABLET ORAL
Qty: 30 TABLET | Refills: 0 | Status: SHIPPED | OUTPATIENT
Start: 2019-07-08 | End: 2019-07-17

## 2019-07-17 ENCOUNTER — OFFICE VISIT (OUTPATIENT)
Dept: FAMILY MEDICINE | Facility: CLINIC | Age: 64
End: 2019-07-17
Payer: COMMERCIAL

## 2019-07-17 VITALS
WEIGHT: 176 LBS | BODY MASS INDEX: 28.28 KG/M2 | TEMPERATURE: 98.4 F | OXYGEN SATURATION: 94 % | DIASTOLIC BLOOD PRESSURE: 80 MMHG | RESPIRATION RATE: 20 BRPM | SYSTOLIC BLOOD PRESSURE: 130 MMHG | HEART RATE: 90 BPM | HEIGHT: 66 IN

## 2019-07-17 DIAGNOSIS — J45.40 MODERATE PERSISTENT ASTHMA, UNCOMPLICATED: ICD-10-CM

## 2019-07-17 DIAGNOSIS — G47.00 PERSISTENT INSOMNIA: ICD-10-CM

## 2019-07-17 DIAGNOSIS — F41.9 ANXIETY: ICD-10-CM

## 2019-07-17 DIAGNOSIS — I10 ESSENTIAL HYPERTENSION: ICD-10-CM

## 2019-07-17 PROCEDURE — 99214 OFFICE O/P EST MOD 30 MIN: CPT | Performed by: FAMILY MEDICINE

## 2019-07-17 PROCEDURE — 86580 TB INTRADERMAL TEST: CPT | Performed by: FAMILY MEDICINE

## 2019-07-17 RX ORDER — BUDESONIDE AND FORMOTEROL FUMARATE DIHYDRATE 160; 4.5 UG/1; UG/1
2 AEROSOL RESPIRATORY (INHALATION) 2 TIMES DAILY
Qty: 3 INHALER | Refills: 3 | Status: CANCELLED | OUTPATIENT
Start: 2019-07-17

## 2019-07-17 RX ORDER — CITALOPRAM HYDROBROMIDE 20 MG/1
20 TABLET ORAL DAILY
Qty: 90 TABLET | Refills: 1 | Status: SHIPPED | OUTPATIENT
Start: 2019-07-17 | End: 2019-12-18 | Stop reason: ALTCHOICE

## 2019-07-17 RX ORDER — LOSARTAN POTASSIUM AND HYDROCHLOROTHIAZIDE 12.5; 5 MG/1; MG/1
1 TABLET ORAL DAILY
Qty: 90 TABLET | Refills: 1 | Status: SHIPPED | OUTPATIENT
Start: 2019-07-17 | End: 2019-12-18

## 2019-07-17 RX ORDER — ALBUTEROL SULFATE 90 UG/1
2 AEROSOL, METERED RESPIRATORY (INHALATION) EVERY 4 HOURS PRN
Qty: 17 G | Refills: 3 | Status: SHIPPED | OUTPATIENT
Start: 2019-07-17

## 2019-07-17 RX ORDER — TRAZODONE HYDROCHLORIDE 100 MG/1
TABLET ORAL
Qty: 180 TABLET | Refills: 1 | Status: SHIPPED | OUTPATIENT
Start: 2019-07-17 | End: 2019-12-18

## 2019-07-17 ASSESSMENT — ASTHMA QUESTIONNAIRES
QUESTION_4 LAST FOUR WEEKS HOW OFTEN HAVE YOU USED YOUR RESCUE INHALER OR NEBULIZER MEDICATION (SUCH AS ALBUTEROL): ONE OR TWO TIMES PER DAY
ACT_TOTALSCORE: 21
QUESTION_2 LAST FOUR WEEKS HOW OFTEN HAVE YOU HAD SHORTNESS OF BREATH: NOT AT ALL
QUESTION_3 LAST FOUR WEEKS HOW OFTEN DID YOUR ASTHMA SYMPTOMS (WHEEZING, COUGHING, SHORTNESS OF BREATH, CHEST TIGHTNESS OR PAIN) WAKE YOU UP AT NIGHT OR EARLIER THAN USUAL IN THE MORNING: NOT AT ALL
QUESTION_5 LAST FOUR WEEKS HOW WOULD YOU RATE YOUR ASTHMA CONTROL: WELL CONTROLLED
ACUTE_EXACERBATION_TODAY: NO
QUESTION_1 LAST FOUR WEEKS HOW MUCH OF THE TIME DID YOUR ASTHMA KEEP YOU FROM GETTING AS MUCH DONE AT WORK, SCHOOL OR AT HOME: NONE OF THE TIME

## 2019-07-17 ASSESSMENT — PAIN SCALES - GENERAL: PAINLEVEL: NO PAIN (0)

## 2019-07-17 ASSESSMENT — MIFFLIN-ST. JEOR: SCORE: 1361.11

## 2019-07-17 NOTE — PROGRESS NOTES
SUBJECTIVE:  63 year oldyear old female enters with  hypertension.  Pt. Has been compliant with medications and medications were reviewed.  No side effects. No chest pain or sob. Low sodium diet.    Current Outpatient Medications:      albuterol (PROAIR HFA/PROVENTIL HFA/VENTOLIN HFA) 108 (90 Base) MCG/ACT inhaler, Inhale 2 puffs into the lungs every 4 hours as needed for shortness of breath / dyspnea, Disp: 17 g, Rfl: 3     budesonide-formoterol (SYMBICORT) 160-4.5 MCG/ACT Inhaler, Inhale 2 puffs into the lungs 2 times daily, Disp: 3 Inhaler, Rfl: 3     citalopram (CELEXA) 20 MG tablet, Take 1 tablet (20 mg) by mouth daily, Disp: 90 tablet, Rfl: 1     fluticasone-vilanterol (BREO ELLIPTA) 200-25 MCG/INH inhaler, Inhale 1 puff into the lungs daily, Disp: 3 Inhaler, Rfl: 1     losartan-hydrochlorothiazide (HYZAAR) 50-12.5 MG tablet, Take 1 tablet by mouth daily, Disp: 90 tablet, Rfl: 1     traZODone (DESYREL) 100 MG tablet, TAKE 2 TABLETS BY MOUTH AT BEDTIME. APPOINTMENT NEEDED FOR FURTHER REFILLS, Disp: 180 tablet, Rfl: 1    Current Facility-Administered Medications:      atropine injection 0.4 mg, 0.4 mg, Intravenous, Once, Francis Lai MD    Facility-Administered Medications Ordered in Other Visits:      DOBUTamine 500 mg in dextrose 5% 250 mL (adult std conc) premix, 2.5-20 mcg/kg/min, Intravenous, Continuous, Francis Lai MD, Stopped at 05/23/19 1400     metoprolol (LOPRESSOR) injection 5 mg, 5 mg, Intravenous, Q5 Min PRN, Francis Lai MD, 1 mg at 05/23/19 1406  Past Medical History:   Diagnosis Date     Generalized anxiety disorder      Hypertension goal BP (blood pressure) < 140/90      Insomnia      Intermittent asthma      Menopausal state      Osteopenia      Orders Only on 05/13/2019   Component Date Value Ref Range Status     Sodium 05/13/2019 139  133 - 144 mmol/L Final     Potassium 05/13/2019 4.3  3.4 - 5.3 mmol/L Final     Chloride 05/13/2019 102  94 - 109 mmol/L  "Final     Carbon Dioxide 05/13/2019 30  20 - 32 mmol/L Final     Anion Gap 05/13/2019 7  3 - 14 mmol/L Final     Glucose 05/13/2019 87  70 - 99 mg/dL Final    Fasting specimen     Urea Nitrogen 05/13/2019 13  7 - 30 mg/dL Final     Creatinine 05/13/2019 0.80  0.52 - 1.04 mg/dL Final     GFR Estimate 05/13/2019 77  >60 mL/min/[1.73_m2] Final    Comment: Non  GFR Calc  Starting 12/18/2018, serum creatinine based estimated GFR (eGFR) will be   calculated using the Chronic Kidney Disease Epidemiology Collaboration   (CKD-EPI) equation.       GFR Estimate If Black 05/13/2019 90  >60 mL/min/[1.73_m2] Final    Comment:  GFR Calc  Starting 12/18/2018, serum creatinine based estimated GFR (eGFR) will be   calculated using the Chronic Kidney Disease Epidemiology Collaboration   (CKD-EPI) equation.       Calcium 05/13/2019 8.6  8.5 - 10.1 mg/dL Final     Cholesterol 05/13/2019 215* <200 mg/dL Final    Desirable:       <200 mg/dl     Triglycerides 05/13/2019 55  <150 mg/dL Final    Fasting specimen     HDL Cholesterol 05/13/2019 127  >49 mg/dL Final     LDL Cholesterol Calculated 05/13/2019 77  <100 mg/dL Final    Desirable:       <100 mg/dl     Non HDL Cholesterol 05/13/2019 88  <130 mg/dL Final      Reviewed health maintenance  Patient Active Problem List   Diagnosis     Insomnia     CARDIOVASCULAR SCREENING; LDL GOAL LESS THAN 130     Hypertension goal BP (blood pressure) < 140/90     Anxiety     Severe persistent asthma without complication     History of colonic polyps     Clavicle fracture     Closed displaced fracture of proximal phalanx of left great toe     Chest wall pain     Thoracic back pain     Advanced directives, counseling/discussion     Angioedema, initial encounter         OBJECTIVE:  no apparent distress  /80   Pulse 90   Temp 98.4  F (36.9  C) (Oral)   Resp 20   Ht 1.67 m (5' 5.75\")   Wt 79.8 kg (176 lb)   SpO2 94%   BMI 28.62 kg/m       Head: Normocephalic. No " masses, lesions, tenderness or abnormalities.  Neck::Neck supple. No adenopathy. Thyroid symmetric, normal size.    Cardiovascular: negative. No lifts, heaves, or thrills. RRR. No murmurs, clicks gallops or rubs  Respiratory. Good diaphragmatic excursion. Lungs clear  Gastrointestinal:Abdomen soft, non-tender.  No masses, organomegaly    Orders Only on 05/13/2019   Component Date Value Ref Range Status     Sodium 05/13/2019 139  133 - 144 mmol/L Final     Potassium 05/13/2019 4.3  3.4 - 5.3 mmol/L Final     Chloride 05/13/2019 102  94 - 109 mmol/L Final     Carbon Dioxide 05/13/2019 30  20 - 32 mmol/L Final     Anion Gap 05/13/2019 7  3 - 14 mmol/L Final     Glucose 05/13/2019 87  70 - 99 mg/dL Final    Fasting specimen     Urea Nitrogen 05/13/2019 13  7 - 30 mg/dL Final     Creatinine 05/13/2019 0.80  0.52 - 1.04 mg/dL Final     GFR Estimate 05/13/2019 77  >60 mL/min/[1.73_m2] Final    Comment: Non  GFR Calc  Starting 12/18/2018, serum creatinine based estimated GFR (eGFR) will be   calculated using the Chronic Kidney Disease Epidemiology Collaboration   (CKD-EPI) equation.       GFR Estimate If Black 05/13/2019 90  >60 mL/min/[1.73_m2] Final    Comment:  GFR Calc  Starting 12/18/2018, serum creatinine based estimated GFR (eGFR) will be   calculated using the Chronic Kidney Disease Epidemiology Collaboration   (CKD-EPI) equation.       Calcium 05/13/2019 8.6  8.5 - 10.1 mg/dL Final     Cholesterol 05/13/2019 215* <200 mg/dL Final    Desirable:       <200 mg/dl     Triglycerides 05/13/2019 55  <150 mg/dL Final    Fasting specimen     HDL Cholesterol 05/13/2019 127  >49 mg/dL Final     LDL Cholesterol Calculated 05/13/2019 77  <100 mg/dL Final    Desirable:       <100 mg/dl     Non HDL Cholesterol 05/13/2019 88  <130 mg/dL Final         ICD-10-CM    1. Essential hypertension I10 losartan-hydrochlorothiazide (HYZAAR) 50-12.5 MG tablet   2. Persistent insomnia G47.00 traZODone (DESYREL)  "100 MG tablet   3. Anxiety F41.9 citalopram (CELEXA) 20 MG tablet   4. Moderate persistent asthma, uncomplicated J45.40 albuterol (PROAIR HFA/PROVENTIL HFA/VENTOLIN HFA) 108 (90 Base) MCG/ACT inhaler     fluticasone-vilanterol (BREO ELLIPTA) 200-25 MCG/INH inhaler    PLAN: Follow up in 6 months         SUBJECTIVE:  SUBJECTIVE:  63 year old.The patient has a history of  follow-up of depressive symptoms.    Since last visit the patient has been okay.  Notes from that visit reviewed. PHQ-9 has been reviewed.  Current symptoms include: Depressed Mood   Symptoms that have subjectively improved include: Depressed Mood  Previous and current treatment modalities employed include: Medications   Celexa (Citalopram)  Patient Active Problem List   Diagnosis     Insomnia     CARDIOVASCULAR SCREENING; LDL GOAL LESS THAN 130     Hypertension goal BP (blood pressure) < 140/90     Anxiety     Severe persistent asthma without complication     History of colonic polyps     Clavicle fracture     Closed displaced fracture of proximal phalanx of left great toe     Chest wall pain     Thoracic back pain     Advanced directives, counseling/discussion     Angioedema, initial encounter      Reviewed health maintenance  OBJECTIVE:  no apparent distress  /80   Pulse 90   Temp 98.4  F (36.9  C) (Oral)   Resp 20   Ht 1.67 m (5' 5.75\")   Wt 79.8 kg (176 lb)   SpO2 94%   BMI 28.62 kg/m         MENTAL STATUS EXAM:   1. Clinical observations:Patient was clean and was adequately groomed. Patient's emotional presentation was cooperative and anule/open. Patient spoke clearly and articulately. Patient maintained adequate eye contact and was cooperative in answering questions.  2. Patient appeared to be well-oriented in all spheres with coherent, logical, goal directed and relevent thinking.  3. Thought content: Denies auditory and visual hallucinations or delusions.  4. Affect and mood: Affect is alert and her emotional attitude is " "described as normal.  5. Sensorium and cognition: Patient was in contact with reality and oriented to place, time, person and situation. patient demonstrated no impairment in immediate, recent, or remote memory. patient's insight was adequate without obvious deficits in intelligence.    ICD-10-CM    1. Essential hypertension I10 losartan-hydrochlorothiazide (HYZAAR) 50-12.5 MG tablet   2. Persistent insomnia G47.00 traZODone (DESYREL) 100 MG tablet   3. Anxiety F41.9 citalopram (CELEXA) 20 MG tablet   4. Moderate persistent asthma, uncomplicated J45.40 albuterol (PROAIR HFA/PROVENTIL HFA/VENTOLIN HFA) 108 (90 Base) MCG/ACT inhaler     fluticasone-vilanterol (BREO ELLIPTA) 200-25 MCG/INH inhaler    PLAN:Follow up in 6 months       SUBJECTIVE:  63 year old.The patient has a complaint of asthma .  This started years ago. Associated symptoms are shortness of breath. patinet is having to use Albuterol inhaler two time per day.ROS no fever or chills        Reviewed health maintenance  Patient Active Problem List   Diagnosis     Insomnia     CARDIOVASCULAR SCREENING; LDL GOAL LESS THAN 130     Hypertension goal BP (blood pressure) < 140/90     Anxiety     Severe persistent asthma without complication     History of colonic polyps     Clavicle fracture     Closed displaced fracture of proximal phalanx of left great toe     Chest wall pain     Thoracic back pain     Advanced directives, counseling/discussion     Angioedema, initial encounter     Past Medical History:   Diagnosis Date     Generalized anxiety disorder      Hypertension goal BP (blood pressure) < 140/90      Insomnia      Intermittent asthma      Menopausal state      Osteopenia        OBJECTIVE:  no apparent distress  /80   Pulse 90   Temp 98.4  F (36.9  C) (Oral)   Resp 20   Ht 1.67 m (5' 5.75\")   Wt 79.8 kg (176 lb)   SpO2 94%   BMI 28.62 kg/m      LUNGS:  CTA B/L, no wheezing or crackles.   Cardiovascular: negative, PMI normal. No lifts, " heaves, or thrills. RRR. No murmurs, clicks gallops or rub   Gastrointestinal: Abdomen soft, non-tender. BS normal. No masses, organomegaly       ICD-10-CM    1. Essential hypertension I10 losartan-hydrochlorothiazide (HYZAAR) 50-12.5 MG tablet   2. Persistent insomnia G47.00 traZODone (DESYREL) 100 MG tablet   3. Anxiety F41.9 citalopram (CELEXA) 20 MG tablet   4. Moderate persistent asthma, uncomplicated J45.40 albuterol (PROAIR HFA/PROVENTIL HFA/VENTOLIN HFA) 108 (90 Base) MCG/ACT inhaler     fluticasone-vilanterol (BREO ELLIPTA) 200-25 MCG/INH inhaler    PLAN: Follow up in 6 months           :

## 2019-07-17 NOTE — NURSING NOTE
"Chief Complaint   Patient presents with     Asthma     Hypertension       Initial /83   Pulse 90   Temp 98.4  F (36.9  C) (Oral)   Resp 20   Ht 1.67 m (5' 5.75\")   Wt 79.8 kg (176 lb)   SpO2 94%   BMI 28.62 kg/m   Estimated body mass index is 28.62 kg/m  as calculated from the following:    Height as of this encounter: 1.67 m (5' 5.75\").    Weight as of this encounter: 79.8 kg (176 lb).  Medication Reconciliation: eleanor Dey M.A.    "

## 2019-07-18 ASSESSMENT — ASTHMA QUESTIONNAIRES: ACT_TOTALSCORE: 21

## 2019-07-19 ENCOUNTER — ALLIED HEALTH/NURSE VISIT (OUTPATIENT)
Dept: NURSING | Facility: CLINIC | Age: 64
End: 2019-07-19
Payer: COMMERCIAL

## 2019-07-19 DIAGNOSIS — Z11.1 SCREENING EXAMINATION FOR PULMONARY TUBERCULOSIS: Primary | ICD-10-CM

## 2019-07-19 LAB
PPDINDURATION: 0 MM (ref 0–5)
PPDREDNESS: 0 MM

## 2019-07-19 PROCEDURE — 99207 ZZC NO CHARGE NURSE ONLY: CPT

## 2019-07-19 NOTE — PROGRESS NOTES
Patient in clinic for mantoux results reading for work    Mantoux result:  Lab Results   Component Value Date    PPDREDNESS 0 07/19/2019    PPDINDURATIO 0 07/19/2019     Is induration greater than 5mm?  No      Sebas Doe RN

## 2019-08-07 DIAGNOSIS — I10 HYPERTENSION GOAL BP (BLOOD PRESSURE) < 140/90: Primary | ICD-10-CM

## 2019-08-08 RX ORDER — LOSARTAN POTASSIUM 50 MG/1
50 TABLET ORAL DAILY
Qty: 90 TABLET | Refills: 1 | Status: SHIPPED | OUTPATIENT
Start: 2019-08-08 | End: 2020-01-06 | Stop reason: ALTCHOICE

## 2019-08-08 RX ORDER — HYDROCHLOROTHIAZIDE 12.5 MG/1
12.5 CAPSULE ORAL DAILY
Qty: 90 CAPSULE | Refills: 1 | Status: SHIPPED | OUTPATIENT
Start: 2019-08-08 | End: 2020-01-06 | Stop reason: ALTCHOICE

## 2019-08-21 ENCOUNTER — TELEPHONE (OUTPATIENT)
Dept: FAMILY MEDICINE | Facility: CLINIC | Age: 64
End: 2019-08-21

## 2019-08-21 NOTE — LETTER
August 21, 2019    Ludy Ramos  [address]  [place] [address]    To whom it may concern:    Patient has DJD of the hips and can not take stairs.              Javan Canela MD    Thank you,   Your [facility] Team/   [phone]

## 2019-08-21 NOTE — TELEPHONE ENCOUNTER
Message from CMR request.    ----- Message -----   From: Ludy Ramos   Sent: 8/21/2019  12:49 PM   To: An Reception   Subject: Letter                                             Topic: Procedural Question      Dr. Canela- I will be traveling to the [facility] and staying in a hotel that has no elevators. Would you write a slip stating I can't do stairs do to hip problems from the past. I will  at clinic. Thank you Ludy Aldridgeed letter in Epic. Is this something you can do for her?Taisha Rojas MA/TC

## 2019-08-23 NOTE — TELEPHONE ENCOUNTER
form was picked up from  by Ludy. ID was checked and patient  label was attached to patient  log.

## 2019-12-18 ENCOUNTER — OFFICE VISIT (OUTPATIENT)
Dept: FAMILY MEDICINE | Facility: CLINIC | Age: 64
End: 2019-12-18
Payer: COMMERCIAL

## 2019-12-18 VITALS
WEIGHT: 187 LBS | BODY MASS INDEX: 30.41 KG/M2 | HEART RATE: 73 BPM | OXYGEN SATURATION: 95 % | DIASTOLIC BLOOD PRESSURE: 79 MMHG | RESPIRATION RATE: 20 BRPM | SYSTOLIC BLOOD PRESSURE: 149 MMHG | TEMPERATURE: 97.7 F

## 2019-12-18 DIAGNOSIS — I10 ESSENTIAL HYPERTENSION: ICD-10-CM

## 2019-12-18 DIAGNOSIS — F41.1 GAD (GENERALIZED ANXIETY DISORDER): ICD-10-CM

## 2019-12-18 DIAGNOSIS — F40.243 ANXIETY WITH FLYING: ICD-10-CM

## 2019-12-18 DIAGNOSIS — M54.50 ACUTE BILATERAL LOW BACK PAIN WITHOUT SCIATICA: Primary | ICD-10-CM

## 2019-12-18 DIAGNOSIS — G47.00 PERSISTENT INSOMNIA: ICD-10-CM

## 2019-12-18 PROCEDURE — 99214 OFFICE O/P EST MOD 30 MIN: CPT | Performed by: FAMILY MEDICINE

## 2019-12-18 RX ORDER — ALPRAZOLAM 0.25 MG
0.25 TABLET ORAL 3 TIMES DAILY PRN
Qty: 6 TABLET | Refills: 0 | Status: SHIPPED | OUTPATIENT
Start: 2019-12-18

## 2019-12-18 RX ORDER — TRAZODONE HYDROCHLORIDE 100 MG/1
TABLET ORAL
Qty: 180 TABLET | Refills: 1 | Status: SHIPPED | OUTPATIENT
Start: 2019-12-18 | End: 2020-07-20

## 2019-12-18 RX ORDER — ESCITALOPRAM OXALATE 10 MG/1
10 TABLET ORAL EVERY MORNING
Qty: 30 TABLET | Refills: 1 | Status: SHIPPED | OUTPATIENT
Start: 2019-12-18 | End: 2020-01-09

## 2019-12-18 RX ORDER — LOSARTAN POTASSIUM AND HYDROCHLOROTHIAZIDE 12.5; 5 MG/1; MG/1
1 TABLET ORAL DAILY
Qty: 90 TABLET | Refills: 1 | Status: SHIPPED | OUTPATIENT
Start: 2019-12-18 | End: 2020-05-28

## 2019-12-18 ASSESSMENT — PATIENT HEALTH QUESTIONNAIRE - PHQ9
5. POOR APPETITE OR OVEREATING: NEARLY EVERY DAY
SUM OF ALL RESPONSES TO PHQ QUESTIONS 1-9: 12

## 2019-12-18 ASSESSMENT — ANXIETY QUESTIONNAIRES
2. NOT BEING ABLE TO STOP OR CONTROL WORRYING: NOT AT ALL
GAD7 TOTAL SCORE: 10
IF YOU CHECKED OFF ANY PROBLEMS ON THIS QUESTIONNAIRE, HOW DIFFICULT HAVE THESE PROBLEMS MADE IT FOR YOU TO DO YOUR WORK, TAKE CARE OF THINGS AT HOME, OR GET ALONG WITH OTHER PEOPLE: VERY DIFFICULT
6. BECOMING EASILY ANNOYED OR IRRITABLE: NEARLY EVERY DAY
5. BEING SO RESTLESS THAT IT IS HARD TO SIT STILL: NOT AT ALL
7. FEELING AFRAID AS IF SOMETHING AWFUL MIGHT HAPPEN: NOT AT ALL
1. FEELING NERVOUS, ANXIOUS, OR ON EDGE: NEARLY EVERY DAY
3. WORRYING TOO MUCH ABOUT DIFFERENT THINGS: SEVERAL DAYS

## 2019-12-18 ASSESSMENT — PAIN SCALES - GENERAL: PAINLEVEL: NO PAIN (0)

## 2019-12-18 NOTE — NURSING NOTE
"Chief Complaint   Patient presents with     Depression Screening     Health Maintenance     order pended, , AAP, fall risk, wellness visit       Initial BP (!) 149/79   Pulse 73   Temp 97.7  F (36.5  C) (Oral)   Resp 20   Wt 84.8 kg (187 lb)   SpO2 95%   BMI 30.41 kg/m   Estimated body mass index is 30.41 kg/m  as calculated from the following:    Height as of 7/17/19: 1.67 m (5' 5.75\").    Weight as of this encounter: 84.8 kg (187 lb).  Medication Reconciliation: complete  Pauline Leiva, CISCO  "

## 2019-12-18 NOTE — PROGRESS NOTES
"SUBJECTIVE:  Ludy Ramos is a 64 year old female who presents for a follow up evaluation of depression and anxiety. She has been on celexa for at least 5 years     The patient reports that her symptoms have improved since starting this medication.   However, within that last 6 month(s) her symptom(s) have  worsened especially the anxiety.   She reports that she gets tense and angry   She has had conflicts with her mother in law. She used to live with the patient and her  but she moved out November 1st.   She get angry and irritable driving  Irritabiilty seems to be a common problem for her.        The patient reports that she is not having any side effects from her current medication.      Current Outpatient Medications   Medication     citalopram (CELEXA) 20 MG tablet     fluticasone-vilanterol (BREO ELLIPTA) 200-25 MCG/INH inhaler     losartan-hydrochlorothiazide (HYZAAR) 50-12.5 MG tablet     traZODone (DESYREL) 100 MG tablet     albuterol (PROAIR HFA/PROVENTIL HFA/VENTOLIN HFA) 108 (90 Base) MCG/ACT inhaler     budesonide-formoterol (SYMBICORT) 160-4.5 MCG/ACT Inhaler     hydrochlorothiazide (MICROZIDE) 12.5 MG capsule     losartan (COZAAR) 50 MG tablet     Current Facility-Administered Medications   Medication     atropine injection 0.4 mg     Facility-Administered Medications Ordered in Other Visits   Medication     DOBUTamine 500 mg in dextrose 5% 250 mL (adult std conc) premix     metoprolol (LOPRESSOR) injection 5 mg             Current thoughts of suicide or homicide:No      Last PHQ-9 score on record= 12    PHQ-9 SCORE 11/5/2015 3/24/2016 6/28/2017   PHQ-9 Total Score - - -   PHQ-9 Total Score 6 16 6         KORTNEY-7    Over the last 2 weeks, how often have you been bothered by the following problems?  (Use an \"x\" to indicate your answer) Not at all                (0) Several days                (1) More than half the days        (2) Nearly every day          (3)   1. Feeling nervous, anxious or " on edge    x   2. Not being able to stop or control worrying x      3. Worrying too much about different things  x     4. Trouble relaxing    x   5. Being so restless that it is hard to sit still x      6. Becoming easily annoyed or irritable    x   7. Feeling afraid as if something awful might happen x            Total_______    Cut points for:   Mild Anxiety =  5  Moderate= 10  Severe=  15         OBJECTIVE:  BP (!) 149/79   Pulse 73   Temp 97.7  F (36.5  C) (Oral)   Resp 20   Wt 84.8 kg (187 lb)   SpO2 95%   BMI 30.41 kg/m     Wt Readings from Last 4 Encounters:   12/18/19 84.8 kg (187 lb)   07/17/19 79.8 kg (176 lb)   05/16/19 80.3 kg (177 lb)   05/15/19 79.8 kg (176 lb)       General: the patient had a calm and jocular affect during the visit today.    ASSESSMENT: Chronic Depression  Generalized Anxiety Disorder  PLAN:    We will taper have the patient discontinue the Celexa  and we will have the patient start on  Lexapro (escitalopram) 10  mg daily. I asked the patient to return to clinic for appointment in 4 weeks for reevaluation.    The patient was advised of the potential side effects of the medication and was asked to call if any of them persist past 7 days of starting it or starting on a new dose.    --------------------------------------------------------------------------------------------------------------------------------------    SUBJECTIVE:  Ludy Ramos is a 64 year old female who is seen for central lower back injury that occurred  2 day(s) ago. The onset of the pain was sudden.  The pain started after an injury in which the patient was lifting something heavy  The patient reports that these symptoms have been waxing and waning since that time  Palliative factors for the pain include:  Nothing   Provacative factors include: lifting    The patient reports that the pain does not radiate.      The patient denies incontinence of urine or stool.    The patients past medical, surgical, social  and family histories were reviewed.  Social History     Socioeconomic History     Marital status:      Spouse name: None     Number of children: None     Years of education: None     Highest education level: None   Occupational History     None   Social Needs     Financial resource strain: None     Food insecurity:     Worry: None     Inability: None     Transportation needs:     Medical: None     Non-medical: None   Tobacco Use     Smoking status: Former Smoker     Packs/day: 0.00     Years: 20.00     Pack years: 0.00     Start date: 1980     Last attempt to quit: 2001     Years since quittin.0     Smokeless tobacco: Never Used   Substance and Sexual Activity     Alcohol use: Yes     Alcohol/week: 10.0 standard drinks     Drug use: No     Sexual activity: Yes     Partners: Male     Birth control/protection: Surgical     Comment: Hyst   Lifestyle     Physical activity:     Days per week: None     Minutes per session: None     Stress: None   Relationships     Social connections:     Talks on phone: None     Gets together: None     Attends Bahai service: None     Active member of club or organization: None     Attends meetings of clubs or organizations: None     Relationship status: None     Intimate partner violence:     Fear of current or ex partner: None     Emotionally abused: None     Physically abused: None     Forced sexual activity: None   Other Topics Concern     Parent/sibling w/ CABG, MI or angioplasty before 65F 55M? No   Social History Narrative     None     Past Medical History:   Diagnosis Date     Generalized anxiety disorder      Hypertension goal BP (blood pressure) < 140/90      Insomnia      Intermittent asthma      Menopausal state      Osteopenia      Current Outpatient Medications   Medication Sig Dispense Refill     ALPRAZolam (XANAX) 0.25 MG tablet Take 1 tablet (0.25 mg) by mouth 3 times daily as needed for anxiety (1 tablet 30-60 minutes before your flight) 6 tablet  0     escitalopram (LEXAPRO) 10 MG tablet Take 1 tablet (10 mg) by mouth every morning 30 tablet 1     fluticasone-vilanterol (BREO ELLIPTA) 200-25 MCG/INH inhaler Inhale 1 puff into the lungs daily 3 Inhaler 1     losartan-hydrochlorothiazide (HYZAAR) 50-12.5 MG tablet Take 1 tablet by mouth daily 90 tablet 1     nabumetone (RELAFEN) 750 MG tablet 1 tablet twice a day for 10 days and then twice a day only as needed 60 tablet 1     traZODone (DESYREL) 100 MG tablet TAKE 2 TABLETS BY MOUTH AT BEDTIME. APPOINTMENT NEEDED FOR FURTHER REFILLS 180 tablet 1     albuterol (PROAIR HFA/PROVENTIL HFA/VENTOLIN HFA) 108 (90 Base) MCG/ACT inhaler Inhale 2 puffs into the lungs every 4 hours as needed for shortness of breath / dyspnea 17 g 3     hydrochlorothiazide (MICROZIDE) 12.5 MG capsule Take 1 capsule (12.5 mg) by mouth daily (Patient not taking: Reported on 12/18/2019) 90 capsule 1     losartan (COZAAR) 50 MG tablet Take 1 tablet (50 mg) by mouth daily (Patient not taking: Reported on 12/18/2019) 90 tablet 1           Allergies as of 12/18/2019     (No Known Allergies)     Current Outpatient Medications   Medication     ALPRAZolam (XANAX) 0.25 MG tablet     escitalopram (LEXAPRO) 10 MG tablet     fluticasone-vilanterol (BREO ELLIPTA) 200-25 MCG/INH inhaler     losartan-hydrochlorothiazide (HYZAAR) 50-12.5 MG tablet     nabumetone (RELAFEN) 750 MG tablet     traZODone (DESYREL) 100 MG tablet     albuterol (PROAIR HFA/PROVENTIL HFA/VENTOLIN HFA) 108 (90 Base) MCG/ACT inhaler     hydrochlorothiazide (MICROZIDE) 12.5 MG capsule     losartan (COZAAR) 50 MG tablet     Current Facility-Administered Medications   Medication     atropine injection 0.4 mg     Facility-Administered Medications Ordered in Other Visits   Medication     DOBUTamine 500 mg in dextrose 5% 250 mL (adult std conc) premix     metoprolol (LOPRESSOR) injection 5 mg         Examination:  BP (!) 149/79   Pulse 73   Temp 97.7  F (36.5  C) (Oral)   Resp 20   Wt  84.8 kg (187 lb)   SpO2 95%   BMI 30.41 kg/m    General: healthy, alert and no distress.  Neuro exam of the lower extremities.   DTR's  Right knee 1+  Right ankle 1+  Left knee 1+  Left ankle1+  Strength was +5 globally in the lower extremities  Back examination: There was tenderness to palpation of the right paraspinal muscles in the lower lumbar region and left paraspinal muscles in the lower lumbar region.  There was not CVA tenderness bilaterally.  Straight leg raise test was negative bilaterally.  There was decreased active range of motion in hip flexion bilaterally          ASSESSMENT/IMPRESSION:  musculoskeletal low back pain    PLAN:    The patient and I discussed the absolute importance of continuing to do range of motion exercises and strengthening exercises despite the immediate discomfort that it may cause.We discussed the use of non steroidal anti-inflammatory drugs and muscle relaxants to help them in this goal. Heat therapy in the morning to improve range of motion was emphasized. The patient was advised only to use ice at the end of the day for pain relief if needed.  The patient was referred to physical therapy for evaluation and treatment.  We will start the patient on relafen.       Follow up in 2 weeks if not improving. if the symptom(s) are not improving.

## 2019-12-19 ASSESSMENT — ANXIETY QUESTIONNAIRES: GAD7 TOTAL SCORE: 10

## 2019-12-30 DIAGNOSIS — J45.40 MODERATE PERSISTENT ASTHMA, UNCOMPLICATED: ICD-10-CM

## 2019-12-31 NOTE — TELEPHONE ENCOUNTER
"Requested Prescriptions   Signed Prescriptions Disp Refills    BREO ELLIPTA 200-25 MCG/INH Inhaler 3 each 0     Sig: INHALE 1 PUFF BY MOUTH ONCE DAILY       Inhaled Steroids Protocol Passed - 12/31/2019 12:33 PM        Passed - Patient is age 12 or older        Passed - Asthma control assessment score within normal limits in last 6 months     Please review ACT score.           Passed - Medication is active on med list        Passed - Recent (6 mo) or future (30 days) visit within the authorizing provider's specialty     Patient had office visit in the last 6 months or has a visit in the next 30 days with authorizing provider or within the authorizing provider's specialty.  See \"Patient Info\" tab in inbasket, or \"Choose Columns\" in Meds & Orders section of the refill encounter.              "

## 2020-01-06 ENCOUNTER — ANCILLARY PROCEDURE (OUTPATIENT)
Dept: GENERAL RADIOLOGY | Facility: CLINIC | Age: 65
End: 2020-01-06
Attending: PHYSICIAN ASSISTANT
Payer: COMMERCIAL

## 2020-01-06 ENCOUNTER — OFFICE VISIT (OUTPATIENT)
Dept: FAMILY MEDICINE | Facility: CLINIC | Age: 65
End: 2020-01-06
Payer: COMMERCIAL

## 2020-01-06 VITALS
OXYGEN SATURATION: 97 % | BODY MASS INDEX: 28.95 KG/M2 | DIASTOLIC BLOOD PRESSURE: 72 MMHG | HEART RATE: 95 BPM | SYSTOLIC BLOOD PRESSURE: 130 MMHG | TEMPERATURE: 97.8 F | WEIGHT: 178 LBS

## 2020-01-06 DIAGNOSIS — J11.1 INFLUENZA-LIKE ILLNESS: Primary | ICD-10-CM

## 2020-01-06 PROCEDURE — 99214 OFFICE O/P EST MOD 30 MIN: CPT | Performed by: PHYSICIAN ASSISTANT

## 2020-01-06 PROCEDURE — 71046 X-RAY EXAM CHEST 2 VIEWS: CPT

## 2020-01-06 RX ORDER — BENZONATATE 200 MG/1
200 CAPSULE ORAL 3 TIMES DAILY PRN
Qty: 30 CAPSULE | Refills: 0 | Status: SHIPPED | OUTPATIENT
Start: 2020-01-06 | End: 2020-06-24

## 2020-01-06 RX ORDER — CODEINE PHOSPHATE AND GUAIFENESIN 10; 100 MG/5ML; MG/5ML
1-2 SOLUTION ORAL EVERY 4 HOURS PRN
Qty: 236 ML | Refills: 0 | Status: SHIPPED | OUTPATIENT
Start: 2020-01-06 | End: 2020-06-24

## 2020-01-06 ASSESSMENT — PATIENT HEALTH QUESTIONNAIRE - PHQ9
SUM OF ALL RESPONSES TO PHQ QUESTIONS 1-9: 5
5. POOR APPETITE OR OVEREATING: NOT AT ALL

## 2020-01-06 ASSESSMENT — ANXIETY QUESTIONNAIRES
5. BEING SO RESTLESS THAT IT IS HARD TO SIT STILL: NOT AT ALL
6. BECOMING EASILY ANNOYED OR IRRITABLE: NOT AT ALL
2. NOT BEING ABLE TO STOP OR CONTROL WORRYING: NOT AT ALL
GAD7 TOTAL SCORE: 0
3. WORRYING TOO MUCH ABOUT DIFFERENT THINGS: NOT AT ALL
1. FEELING NERVOUS, ANXIOUS, OR ON EDGE: NOT AT ALL
7. FEELING AFRAID AS IF SOMETHING AWFUL MIGHT HAPPEN: NOT AT ALL

## 2020-01-06 ASSESSMENT — PAIN SCALES - GENERAL: PAINLEVEL: NO PAIN (0)

## 2020-01-06 NOTE — LETTER
[facility]  [address] [facility] [address]  Phone: [phone]    January 6, 2020        Ludy Ramos  [address] [facility] [address]          To whom it may concern:    RE: Ludy Ramos    Patient was seen and treated today at our clinic and missed work. She has been ill with influenza like illness and will need to be excused until she is feeling better. I anticipate her returning to work after her cough is better and she no longer has a fever.     Please contact me for questions or concerns.      Sincerely,        Kristen M. Kehr, PA-C

## 2020-01-06 NOTE — PATIENT INSTRUCTIONS
Patient Education     Influenza (Adult)    Influenza is also called the flu. It is a viral illness that affects the air passages of your lungs. It is different from the common cold. The flu can easily be passed from one to person to another. It may be spread through the air by coughing and sneezing. Or it can be spread by touching the sick person and then touching your own eyes, nose, or mouth.  The flu starts 1 to 3 days after you are exposed to the flu virus. It may last for 1 to 2 weeks but many people feel tired or fatigued for many weeks afterward. You usually don t need to take antibiotics unless you have a complication. This might be an ear or sinus infection or pneumonia.  Symptoms of the flu may be mild or severe. They can include extreme tiredness (wanting to stay in bed all day), chills, fevers, muscle aches, soreness with eye movement, headache, and a dry, hacking cough.  Home care  Follow these guidelines when caring for yourself at home:    Avoid being around cigarette smoke, whether yours or other people s.    Acetaminophen or ibuprofen will help ease your fever, muscle aches, and headache. Don t give aspirin to anyone younger than 18 who has the flu. Aspirin can harm the liver.    Nausea and loss of appetite are common with the flu. Eat light meals. Drink 6 to 8 glasses of liquids every day. Good choices are water, sport drinks, soft drinks without caffeine, juices, tea, and soup. Extra fluids will also help loosen secretions in your nose and lungs.    Over-the-counter cold medicines will not make the flu go away faster. But the medicines may help with coughing, sore throat, and congestion in your nose and sinuses. Don t use a decongestant if you have high blood pressure.    Stay home until your fever has been gone for at least 24 hours without using medicine to reduce fever.  Follow-up care  Follow up with your healthcare provider, or as advised, if you are not getting better over the next  week.  If you are age 65 or older, talk with your provider about getting a pneumococcal vaccine every 5 years. You should also get this vaccine if you have chronic asthma or COPD. All adults should get a flu vaccine every fall. Ask your provider about this.  When to seek medical advice  Call your healthcare provider right away if any of these occur:    Cough with lots of colored mucus (sputum) or blood in your mucus    Chest pain, shortness of breath, wheezing, or trouble breathing    Severe headache, or face, neck, or ear pain    New rash with fever    Fever of 100.4 F (38 C) or higher, or as directed by your healthcare provider    Confusion, behavior change, or seizure    Severe weakness or dizziness    You get a new fever or cough after getting better for a few days  Date Last Reviewed: 1/1/2017 2000-2019 [employer]. [address] [place] [address], [place] [address]. All rights reserved. This information is not intended as a substitute for professional medical care. Always follow your healthcare professional's instructions.

## 2020-01-06 NOTE — LETTER
My Asthma Action Plan    Name: Ludy Ramos   YOB: 1955  Date: 1/6/2020   My doctor: Kristen M. Kehr, PA-C   My clinic: [facility]        My Control Medicine: Fluticasone furoate + vilanterol (Breo Ellipta)  -  200/25mcg 1 puff daily  My Rescue Medicine: Albuterol (Proair/Ventolin/Proventil HFA) 2-4 puffs EVERY 4 HOURS as needed. Use a spacer if recommended by your provider.   My Asthma Severity:   Severe Persistent  Know your asthma triggers: upper respiratory infections and Patient is unaware of triggers               GREEN ZONE   Good Control    I feel good    No cough or wheeze    Can work, sleep and play without asthma symptoms       Take your asthma control medicine every day.     1. If exercise triggers your asthma, take your rescue medication    15 minutes before exercise or sports, and    During exercise if you have asthma symptoms  2. Spacer to use with inhaler: If you have a spacer, make sure to use it with your inhaler             YELLOW ZONE Getting Worse  I have ANY of these:    I do not feel good    Cough or wheeze    Chest feels tight    Wake up at night   1. Keep taking your Green Zone medications  2. Start taking your rescue medicine:    every 20 minutes for up to 1 hour. Then every 4 hours for 24-48 hours.  3. If you stay in the Yellow Zone for more than 12-24 hours, contact your doctor.  4. If you do not return to the Green Zone in 12-24 hours or you get worse, start taking your oral steroid medicine if prescribed by your provider.           RED ZONE Medical Alert - Get Help  I have ANY of these:    I feel awful    Medicine is not helping    Breathing getting harder    Trouble walking or talking    Nose opens wide to breathe       1. Take your rescue medicine NOW  2. If your provider has prescribed an oral steroid medicine, start taking it NOW  3. Call your doctor NOW  4. If you are still in the Red Zone after 20 minutes and you have not reached your  doctor:    Take your rescue medicine again and    Call 911 or go to the emergency room right away    See your regular doctor within 2 weeks of an Emergency Room or Urgent Care visit for follow-up treatment.          Annual Reminders:  Meet with Asthma Educator,  Flu Shot in the Fall, consider Pneumonia Vaccination for patients with asthma (aged 19 and older).    Pharmacy:    [id number] - [place], [place] - [address] [id number] - [place] - [address] [address]     Electronically signed by Kristen M. Kehr, PA-C   Date: 01/06/20                      Asthma Triggers  How To Control Things That Make Your Asthma Worse    Triggers are things that make your asthma worse.  Look at the list below to help you find your triggers and what you can do about them.  You can help prevent asthma flare-ups by staying away from your triggers.      Trigger                                                          What you can do   Cigarette Smoke  Tobacco smoke can make asthma worse. Do not allow smoking in your home, car or around you.  Be sure no one smokes at a child s day care or school.  If you smoke, ask your health care provider for ways to help you quit.  Ask family members to quit too.  Ask your health care provider for a referral to Quit Plan to help you quit smoking, or call 8-617-070-PLAN.     Colds, Flu, Bronchitis  These are common triggers of asthma. Wash your hands often.  Don t touch your eyes, nose or mouth.  Get a flu shot every year.     Dust Mites  These are tiny bugs that live in cloth or carpet. They are too small to see. Wash sheets and blankets in hot water every week.   Encase pillows and mattress in dust mite proof covers.  Avoid having carpet if you can. If you have carpet, vacuum weekly.   Use a dust mask and HEPA vacuum.   Pollen and Outdoor Mold  Some people are allergic to trees, grass, or weed pollen, or molds. Try to keep your windows closed.  Limit time out doors when  pollen count is high.   Ask you health care provider about taking medicine during allergy season.     Animal Dander  Some people are allergic to skin flakes, urine or saliva from pets with fur or feathers. Keep pets with fur or feathers out of your home.    If you can t keep the pet outdoors, then keep the pet out of your bedroom.  Keep the bedroom door closed.  Keep pets off cloth furniture and away from stuffed toys.     Mice, Rats, and Cockroaches   Some people are allergic to the waste from these pests.   Cover food and garbage.  Clean up spills and food crumbs.  Store grease in the refrigerator.   Keep food out of the bedroom.   Indoor Mold  This can be a trigger if your home has high moisture. Fix leaking faucets, pipes, or other sources of water.   Clean moldy surfaces.  Dehumidify basement if it is damp and smelly.   Smoke, Strong Odors, and Sprays  These can reduce air quality. Stay away from strong odors and sprays, such as perfume, powder, hair spray, paints, smoke incense, paint, cleaning products, candles and new carpet.   Exercise or Sports  Some people with asthma have this trigger. Be active!  Ask your doctor about taking medicine before sports or exercise to prevent symptoms.    Warm up for 5-10 minutes before and after sports or exercise.     Other Triggers of Asthma  Cold air:  Cover your nose and mouth with a scarf.  Sometimes laughing or crying can be a trigger.  Some medicines and food can trigger asthma.

## 2020-01-06 NOTE — NURSING NOTE
"Chief Complaint   Patient presents with     Cough     Fever       Initial BP (!) 150/86   Pulse 95   Temp 97.8  F (36.6  C) (Oral)   Wt 80.7 kg (178 lb)   SpO2 97%   BMI 28.95 kg/m   Estimated body mass index is 28.95 kg/m  as calculated from the following:    Height as of 7/17/19: 1.67 m (5' 5.75\").    Weight as of this encounter: 80.7 kg (178 lb).  Medication Reconciliation: complete    SOUMYA Garcia MA    "

## 2020-01-06 NOTE — PROGRESS NOTES
Subjective     Ludy Ramos is a 64 year old female who presents to clinic today for the following health issues:    Ludy is here today for cough / influenza like illness.   It started a week ago. She had significant chills /myalgias and developed the cough. She has been resting. She continues to have a productive cough.     HPI   ENT Symptoms             Symptoms: cc Present Absent Comment   Fever/Chills  x  both   Fatigue  x     Muscle Aches  x     Eye Irritation   x    Sneezing  x     Nasal Brady/Drg  x     Sinus Pressure/Pain   x    Loss of smell  x     Dental pain   x    Sore Throat   x    Swollen Glands   x    Ear Pain/Fullness   x    Cough  x     Wheeze  x     Chest Pain   x    Shortness of breath  x     Rash   x    Other  x  Stomach cramps/pain     Symptom duration:  1 week   Symptom severity:  varies   Treatments tried:  aleve   Contacts:  none         Patient Active Problem List   Diagnosis     Insomnia     CARDIOVASCULAR SCREENING; LDL GOAL LESS THAN 130     Hypertension goal BP (blood pressure) < 140/90     Anxiety     Severe persistent asthma without complication     History of colonic polyps     Clavicle fracture     Closed displaced fracture of proximal phalanx of left great toe     Chest wall pain     Thoracic back pain     Advanced directives, counseling/discussion     Angioedema, initial encounter     Past Surgical History:   Procedure Laterality Date     COLONOSCOPY       COLONOSCOPY  5/20/2014    Procedure: COLONOSCOPY;  Surgeon: Jean Hastings MD;  Location: [facility]     COLONOSCOPY  5/20/2014    Procedure: COMBINED COLONOSCOPY, SINGLE BIOPSY/POLYPECTOMY BY BIOPSY;  Surgeon: Jean Hastings MD;  Location: [facility]     COLONOSCOPY N/A 5/5/2017    Procedure: COMBINED COLONOSCOPY, SINGLE OR MULTIPLE BIOPSY/POLYPECTOMY BY BIOPSY;;  Surgeon: Duane, William Charles, MD;  Location: [facility]     COLONOSCOPY WITH CO2 INSUFFLATION N/A 5/5/2017    Procedure: COLONOSCOPY WITH CO2 INSUFFLATION;   colonoscopy, History of colonic polyps  BMI 25.74  [facility], fax: [phone];  Surgeon: Duane, William Charles, MD;  Location: [facility] OR     FINGER SURGERY      Left 4th digit     HYSTERECTOMY, JONAS       SURGICAL HISTORY OF -       Right 2nd digit     TONSILLECTOMY         Social History     Tobacco Use     Smoking status: Former Smoker     Packs/day: 0.00     Years: 20.00     Pack years: 0.00     Start date: 1980     Last attempt to quit: 2001     Years since quittin.0     Smokeless tobacco: Never Used   Substance Use Topics     Alcohol use: Yes     Alcohol/week: 10.0 standard drinks     Family History   Problem Relation Age of Onset     Respiratory Mother         copd     Respiratory Father         empysema     Melanoma No family hx of          Current Outpatient Medications   Medication Sig Dispense Refill     albuterol (PROAIR HFA/PROVENTIL HFA/VENTOLIN HFA) 108 (90 Base) MCG/ACT inhaler Inhale 2 puffs into the lungs every 4 hours as needed for shortness of breath / dyspnea 17 g 3     benzonatate (TESSALON) 200 MG capsule Take 1 capsule (200 mg) by mouth 3 times daily as needed for cough 30 capsule 0     BREO ELLIPTA 200-25 MCG/INH Inhaler INHALE 1 PUFF BY MOUTH ONCE DAILY 3 each 0     escitalopram (LEXAPRO) 10 MG tablet Take 1 tablet (10 mg) by mouth every morning 30 tablet 1     guaiFENesin-codeine (ROBITUSSIN AC) 100-10 MG/5ML solution Take 5-10 mLs by mouth every 4 hours as needed for cough 236 mL 0     losartan-hydrochlorothiazide (HYZAAR) 50-12.5 MG tablet Take 1 tablet by mouth daily 90 tablet 1     nabumetone (RELAFEN) 750 MG tablet 1 tablet twice a day for 10 days and then twice a day only as needed 60 tablet 1     traZODone (DESYREL) 100 MG tablet TAKE 2 TABLETS BY MOUTH AT BEDTIME. APPOINTMENT NEEDED FOR FURTHER REFILLS 180 tablet 1     ALPRAZolam (XANAX) 0.25 MG tablet Take 1 tablet (0.25 mg) by mouth 3 times daily as needed for anxiety (1 tablet 30-60 minutes before  your flight) (Patient not taking: Reported on 1/6/2020) 6 tablet 0     No Known Allergies  BP Readings from Last 3 Encounters:   01/06/20 130/72   12/18/19 (!) 149/79   07/17/19 130/80    Wt Readings from Last 3 Encounters:   01/06/20 80.7 kg (178 lb)   12/18/19 84.8 kg (187 lb)   07/17/19 79.8 kg (176 lb)                    Reviewed and updated as needed this visit by Provider  Tobacco  Allergies  Meds  Problems  Med Hx  Surg Hx  Fam Hx         Review of Systems   ROS COMP: Constitutional, HEENT, cardiovascular, pulmonary, GI, , musculoskeletal, neuro, skin, endocrine and psych systems are negative, except as otherwise noted.      Objective    /72   Pulse 95   Temp 97.8  F (36.6  C) (Oral)   Wt 80.7 kg (178 lb)   SpO2 97%   BMI 28.95 kg/m    Body mass index is 28.95 kg/m .  Physical Exam   GENERAL: healthy, alert and no distress  EYES: Eyes grossly normal to inspection, PERRL and conjunctivae and sclerae normal  HENT: ear canals and TM's normal, nose and mouth without ulcers or lesions  NECK: no adenopathy, no asymmetry, masses, or scars and thyroid normal to palpation  RESP: lungs clear to auscultation - no rales, rhonchi or wheezes  CV: regular rate and rhythm, normal S1 S2, no S3 or S4, no murmur, click or rub, no peripheral edema and peripheral pulses strong  MS: no gross musculoskeletal defects noted, no edema  SKIN: no suspicious lesions or rashes  NEURO: Normal strength and tone, mentation intact and speech normal  PSYCH: mentation appears normal, affect normal/bright    Diagnostic Test Results:  CXR: normal appearing. No infiltrate. Radiology will also review.         Assessment & Plan     1. Influenza-like illness  CXR reviewed and no infiltrate seen, Radiology will have a report within 24 hours.   Explained that influenza is a viral illness and does no antibiotic is indicated. She requested a zpak.   She is going to continue with symptomatic treatments and use of her inhalers on a  "regular basis.   Plan to return to work when she no longer has a fever and cough has improved. Recommend that she wear a mask.   Side effects and how to take the medication discussed.  - XR Chest 2 Views  - guaiFENesin-codeine (ROBITUSSIN AC) 100-10 MG/5ML solution; Take 5-10 mLs by mouth every 4 hours as needed for cough  Dispense: 236 mL; Refill: 0  - benzonatate (TESSALON) 200 MG capsule; Take 1 capsule (200 mg) by mouth 3 times daily as needed for cough  Dispense: 30 capsule; Refill: 0     BMI:   Estimated body mass index is 28.95 kg/m  as calculated from the following:    Height as of 7/17/19: 1.67 m (5' 5.75\").    Weight as of this encounter: 80.7 kg (178 lb).   Weight management plan: Discussed healthy diet and exercise guidelines          Return in about 2 weeks (around 1/20/2020) for with primary provider, as needed if symptoms worsen or persist.    Kristen M. Kehr, PA-C  [facility]    "

## 2020-01-07 ASSESSMENT — ASTHMA QUESTIONNAIRES: ACT_TOTALSCORE: 18

## 2020-01-07 ASSESSMENT — ANXIETY QUESTIONNAIRES: GAD7 TOTAL SCORE: 0

## 2020-01-09 ENCOUNTER — OFFICE VISIT (OUTPATIENT)
Dept: FAMILY MEDICINE | Facility: CLINIC | Age: 65
End: 2020-01-09
Payer: COMMERCIAL

## 2020-01-09 VITALS
SYSTOLIC BLOOD PRESSURE: 128 MMHG | OXYGEN SATURATION: 96 % | DIASTOLIC BLOOD PRESSURE: 72 MMHG | BODY MASS INDEX: 29.11 KG/M2 | WEIGHT: 179 LBS | HEART RATE: 103 BPM | TEMPERATURE: 97.6 F

## 2020-01-09 DIAGNOSIS — F41.1 GAD (GENERALIZED ANXIETY DISORDER): Primary | ICD-10-CM

## 2020-01-09 PROCEDURE — 99213 OFFICE O/P EST LOW 20 MIN: CPT | Performed by: PHYSICIAN ASSISTANT

## 2020-01-09 RX ORDER — CITALOPRAM HYDROBROMIDE 40 MG/1
40 TABLET ORAL DAILY
Qty: 90 TABLET | Refills: 0 | Status: CANCELLED | OUTPATIENT
Start: 2020-01-09

## 2020-01-09 RX ORDER — CITALOPRAM HYDROBROMIDE 20 MG/1
20 TABLET ORAL DAILY
Qty: 90 TABLET | Refills: 1 | Status: SHIPPED | OUTPATIENT
Start: 2020-01-09 | End: 2020-05-28

## 2020-01-09 ASSESSMENT — ANXIETY QUESTIONNAIRES
3. WORRYING TOO MUCH ABOUT DIFFERENT THINGS: NOT AT ALL
7. FEELING AFRAID AS IF SOMETHING AWFUL MIGHT HAPPEN: NOT AT ALL
1. FEELING NERVOUS, ANXIOUS, OR ON EDGE: SEVERAL DAYS
2. NOT BEING ABLE TO STOP OR CONTROL WORRYING: NOT AT ALL
5. BEING SO RESTLESS THAT IT IS HARD TO SIT STILL: MORE THAN HALF THE DAYS
6. BECOMING EASILY ANNOYED OR IRRITABLE: NEARLY EVERY DAY
GAD7 TOTAL SCORE: 8
IF YOU CHECKED OFF ANY PROBLEMS ON THIS QUESTIONNAIRE, HOW DIFFICULT HAVE THESE PROBLEMS MADE IT FOR YOU TO DO YOUR WORK, TAKE CARE OF THINGS AT HOME, OR GET ALONG WITH OTHER PEOPLE: NOT DIFFICULT AT ALL

## 2020-01-09 ASSESSMENT — PAIN SCALES - GENERAL: PAINLEVEL: NO PAIN (0)

## 2020-01-09 ASSESSMENT — PATIENT HEALTH QUESTIONNAIRE - PHQ9: 5. POOR APPETITE OR OVEREATING: MORE THAN HALF THE DAYS

## 2020-01-09 NOTE — PROGRESS NOTES
Subjective     Ludy Ramos is a 64 year old female who presents to clinic today for the following health issues:    She had medication adjustment 3 weeks ago. She switched from citalopram to lexapro. She had her mother in law living with her at that time and she has now moved out. She does not feel that the lexapro has been working well and would like to go back to the citalopram.     HPI   Medication Followup of Escitalopram (Lexapro) 10 mg    Taking Medication as prescribed: NO-stopped by patient yesterday    Side Effects:  Tense and mor axiety    Medication Helping Symptoms:  NO       Patient Active Problem List   Diagnosis     Insomnia     CARDIOVASCULAR SCREENING; LDL GOAL LESS THAN 130     Hypertension goal BP (blood pressure) < 140/90     Anxiety     Severe persistent asthma without complication     History of colonic polyps     Clavicle fracture     Closed displaced fracture of proximal phalanx of left great toe     Chest wall pain     Thoracic back pain     Advanced directives, counseling/discussion     Angioedema, initial encounter     Past Surgical History:   Procedure Laterality Date     COLONOSCOPY       COLONOSCOPY  5/20/2014    Procedure: COLONOSCOPY;  Surgeon: Jean Hastings MD;  Location: [facility]     COLONOSCOPY  5/20/2014    Procedure: COMBINED COLONOSCOPY, SINGLE BIOPSY/POLYPECTOMY BY BIOPSY;  Surgeon: Jean Hastings MD;  Location: [facility]     COLONOSCOPY N/A 5/5/2017    Procedure: COMBINED COLONOSCOPY, SINGLE OR MULTIPLE BIOPSY/POLYPECTOMY BY BIOPSY;;  Surgeon: Duane, William Charles, MD;  Location: [facility]     COLONOSCOPY WITH CO2 INSUFFLATION N/A 5/5/2017    Procedure: COLONOSCOPY WITH CO2 INSUFFLATION;  colonoscopy, History of colonic polyps  BMI 25.74  [facility]: [phone];  Surgeon: Duane, William Charles, MD;  Location: [facility]     FINGER SURGERY      Left 4th digit     HYSTERECTOMY, JONAS       SURGICAL HISTORY OF -       Right 2nd digit     TONSILLECTOMY          Social History     Tobacco Use     Smoking status: Former Smoker     Packs/day: 0.00     Years: 20.00     Pack years: 0.00     Start date: 1980     Last attempt to quit: 2001     Years since quittin.0     Smokeless tobacco: Never Used   Substance Use Topics     Alcohol use: Yes     Alcohol/week: 10.0 standard drinks     Family History   Problem Relation Age of Onset     Respiratory Mother         copd     Respiratory Father         empysema     Melanoma No family hx of          No Known Allergies  BP Readings from Last 3 Encounters:   20 128/72   20 130/72   19 (!) 149/79    Wt Readings from Last 3 Encounters:   20 81.2 kg (179 lb)   20 80.7 kg (178 lb)   19 84.8 kg (187 lb)                    Reviewed and updated as needed this visit by Provider  Tobacco  Allergies  Meds  Problems  Med Hx  Surg Hx  Fam Hx         Review of Systems   ROS COMP: Constitutional, HEENT, cardiovascular, pulmonary, gi and gu systems are negative, except as otherwise noted.      Objective    /72   Pulse 103   Temp 97.6  F (36.4  C) (Oral)   Wt 81.2 kg (179 lb)   SpO2 96%   BMI 29.11 kg/m    Body mass index is 29.11 kg/m .  Physical Exam   GENERAL: healthy, alert and no distress  PSYCH: mentation appears normal, affect normal/bright, judgement and insight intact and appearance well groomed    Diagnostic Test Results:  Labs reviewed in Epic        Assessment & Plan     1. KORTNEY (generalized anxiety disorder)  She is going to go back to the citalopram.   She had a situational increase in anxiety and now better controlled.   Refills given x 6 months.   - citalopram (CELEXA) 20 MG tablet; Take 1 tablet (20 mg) by mouth daily  Dispense: 90 tablet; Refill: 1         Return in about 6 months (around 2020) for with primary provider.    Kristen M. Kehr, PA-C  [facility]

## 2020-01-09 NOTE — NURSING NOTE
"Chief Complaint   Patient presents with     Anxiety     discuss meds       Initial BP (!) 155/85   Pulse 103   Temp 97.6  F (36.4  C) (Oral)   Wt 81.2 kg (179 lb)   SpO2 96%   BMI 29.11 kg/m   Estimated body mass index is 29.11 kg/m  as calculated from the following:    Height as of 7/17/19: 1.67 m (5' 5.75\").    Weight as of this encounter: 81.2 kg (179 lb).  Medication Reconciliation: complete    SOUMYA Garcia MA  "

## 2020-01-10 ASSESSMENT — ANXIETY QUESTIONNAIRES: GAD7 TOTAL SCORE: 8

## 2020-01-20 ENCOUNTER — TELEPHONE (OUTPATIENT)
Dept: FAMILY MEDICINE | Facility: CLINIC | Age: 65
End: 2020-01-20

## 2020-01-20 NOTE — TELEPHONE ENCOUNTER
Score is 20 or greater.  Patient passes.  No further action needed.    Per protocol, will route encounter to be cosigned by provider for Verbal Orders.  Bisi Best RN

## 2020-01-21 ASSESSMENT — ASTHMA QUESTIONNAIRES: ACT_TOTALSCORE: 20

## 2020-02-23 ENCOUNTER — HEALTH MAINTENANCE LETTER (OUTPATIENT)
Age: 65
End: 2020-02-23

## 2020-03-28 DIAGNOSIS — J45.40 MODERATE PERSISTENT ASTHMA, UNCOMPLICATED: ICD-10-CM

## 2020-05-28 DIAGNOSIS — F41.1 GAD (GENERALIZED ANXIETY DISORDER): ICD-10-CM

## 2020-05-28 DIAGNOSIS — J45.40 MODERATE PERSISTENT ASTHMA, UNCOMPLICATED: ICD-10-CM

## 2020-05-28 DIAGNOSIS — I10 ESSENTIAL HYPERTENSION: ICD-10-CM

## 2020-05-28 RX ORDER — CITALOPRAM HYDROBROMIDE 20 MG/1
TABLET ORAL
Qty: 90 TABLET | Refills: 0 | Status: SHIPPED | OUTPATIENT
Start: 2020-05-28 | End: 2020-09-01

## 2020-05-28 RX ORDER — LOSARTAN POTASSIUM AND HYDROCHLOROTHIAZIDE 12.5; 5 MG/1; MG/1
TABLET ORAL
Qty: 90 TABLET | Refills: 0 | Status: SHIPPED | OUTPATIENT
Start: 2020-05-28 | End: 2020-12-16

## 2020-06-16 ENCOUNTER — VIRTUAL VISIT (OUTPATIENT)
Dept: FAMILY MEDICINE | Facility: CLINIC | Age: 65
End: 2020-06-16
Payer: COMMERCIAL

## 2020-06-16 DIAGNOSIS — M54.50 ACUTE BILATERAL LOW BACK PAIN WITHOUT SCIATICA: ICD-10-CM

## 2020-06-16 DIAGNOSIS — M54.50 ACUTE RIGHT-SIDED LOW BACK PAIN WITHOUT SCIATICA: Primary | ICD-10-CM

## 2020-06-16 PROCEDURE — 99213 OFFICE O/P EST LOW 20 MIN: CPT | Mod: 95 | Performed by: FAMILY MEDICINE

## 2020-06-16 RX ORDER — HYDROCODONE BITARTRATE AND ACETAMINOPHEN 5; 325 MG/1; MG/1
1 TABLET ORAL EVERY 6 HOURS PRN
Qty: 30 TABLET | Refills: 0 | Status: SHIPPED | OUTPATIENT
Start: 2020-06-16 | End: 2020-08-31

## 2020-06-16 RX ORDER — CYCLOBENZAPRINE HCL 10 MG
10 TABLET ORAL 3 TIMES DAILY PRN
Qty: 30 TABLET | Refills: 1 | Status: SHIPPED | OUTPATIENT
Start: 2020-06-16 | End: 2020-08-31

## 2020-06-16 NOTE — PROGRESS NOTES
"Ludy Ramos is a 64 year old female who is being evaluated via a billable telephone visit.      The patient has been notified of following:     \"This telephone visit will be conducted via a call between you and your physician/provider. We have found that certain health care needs can be provided without the need for a physical exam.  This service lets us provide the care you need with a short phone conversation.  If a prescription is necessary we can send it directly to your pharmacy.  If lab work is needed we can place an order for that and you can then stop by our lab to have the test done at a later time.    Telephone visits are billed at different rates depending on your insurance coverage. During this emergency period, for some insurers they may be billed the same as an in-person visit.  Please reach out to your insurance provider with any questions.    If during the course of the call the physician/provider feels a telephone visit is not appropriate, you will not be charged for this service.\"    Patient has given verbal consent for Telephone visit?  Yes    What phone number would you like to be contacted at? [phone]    How would you like to obtain your AVS? MyChart    Subjective     Ludy Ramos is a 64 year old female who presents via phone visit today for the following health issues:    HPI     Back pain since Friday- pt fell and slipped backward       Reviewed and updated as needed this visit by Provider         Review of Systems          Objective   Reported vitals:  There were no vitals taken for this visit.   healthy, alert and no distress  PSYCH: Alert and oriented times 3; coherent speech, normal   rate and volume, able to articulate logical thoughts, able   to abstract reason, no tangential thoughts, no hallucinations   or delusions  Her affect is normal  RESP: No cough, no audible wheezing, able to talk in full sentences  Remainder of exam unable to be completed due to telephone " visits    Diagnostic Test Results:  Labs reviewed in Epic        Assessment/Plan:  1. Acute bilateral low back pain without sciatica      2. Acute right-sided low back pain without sciatica    - HYDROcodone-acetaminophen (NORCO) 5-325 MG tablet; Take 1 tablet by mouth every 6 hours as needed for pain Do not take within 8 hours of taking a xanax (alprazolam)  Dispense: 30 tablet; Refill: 0  - cyclobenzaprine (FLEXERIL) 10 MG tablet; Take 1 tablet (10 mg) by mouth 3 times daily as needed for muscle spasms  Dispense: 30 tablet; Refill: 1  - nabumetone (RELAFEN) 750 MG tablet; 1 tablet twice a day for 10 days and then twice a day only as needed  Dispense: 60 tablet; Refill: 1    No follow-ups on file.      Phone call duration:  13 minutes    Ace Steinberg MD    --------------------------------------------------------------------------------------------------------------------------------------  SUBJECTIVE:  Ludy Ramos is a 64 year old female who is seen for right back injury that occurred  4 day(s) ago. The onset of the pain was sudden.  The pain started after an injury in which the patient feel onto her table at home.    The patient reports that these symptoms have been gradually worsening since that time  Palliative factors for the pain include:  Rest   Provacative factors include:any movement    She has tried the nabumetone and the flexeril and they are not helping much    The patient reports that the pain radiates into the right buttocks.    The patient denies incontinence of urine or stool.    The patient reports a prior history of problems with her back. These problems were one self limited episodes of low back pain in the past.  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    The patients past medical, surgical, social and family histories were reviewed.  Social History     Socioeconomic History     Marital status:       Spouse name: Not on file     Number of children: Not on file     Years of education: Not on file     Highest education level: Not on file   Occupational History     Not on file   Social Needs     Financial resource strain: Not on file     Food insecurity     Worry: Not on file     Inability: Not on file     Transportation needs     Medical: Not on file     Non-medical: Not on file   Tobacco Use     Smoking status: Former Smoker     Packs/day: 0.00     Years: 20.00     Pack years: 0.00     Start date: 1980     Last attempt to quit: 2001     Years since quittin.5     Smokeless tobacco: Never Used   Substance and Sexual Activity     Alcohol use: Yes     Alcohol/week: 10.0 standard drinks     Drug use: No     Sexual activity: Yes     Partners: Male     Birth control/protection: Surgical     Comment: Hyst   Lifestyle     Physical activity     Days per week: Not on file     Minutes per session: Not on file     Stress: Not on file   Relationships     Social connections     Talks on phone: Not on file     Gets together: Not on file     Attends Roman Catholic service: Not on file     Active member of club or organization: Not on file     Attends meetings of clubs or organizations: Not on file     Relationship status: Not on file     Intimate partner violence     Fear of current or ex partner: Not on file     Emotionally abused: Not on file     Physically abused: Not on file     Forced sexual activity: Not on file   Other Topics Concern     Parent/sibling w/ CABG, MI or angioplasty before 65F 55M? No   Social History Narrative     Not on file     Past Medical History:   Diagnosis Date     Generalized anxiety disorder      Hypertension goal BP (blood pressure) < 140/90      Insomnia      Intermittent asthma      Menopausal state      Osteopenia      Current Outpatient Medications   Medication Sig Dispense Refill     albuterol (PROAIR HFA/PROVENTIL HFA/VENTOLIN HFA) 108 (90 Base) MCG/ACT inhaler Inhale 2 puffs into  the lungs every 4 hours as needed for shortness of breath / dyspnea 17 g 3     ALPRAZolam (XANAX) 0.25 MG tablet Take 1 tablet (0.25 mg) by mouth 3 times daily as needed for anxiety (1 tablet 30-60 minutes before your flight) 6 tablet 0     benzonatate (TESSALON) 200 MG capsule Take 1 capsule (200 mg) by mouth 3 times daily as needed for cough 30 capsule 0     BREO ELLIPTA 200-25 MCG/INH Inhaler Inhale 1 puff by mouth once daily 3 Inhaler 0     citalopram (CELEXA) 20 MG tablet Take 1 tablet by mouth once daily 90 tablet 0     guaiFENesin-codeine (ROBITUSSIN AC) 100-10 MG/5ML solution Take 5-10 mLs by mouth every 4 hours as needed for cough 236 mL 0     losartan-hydrochlorothiazide (HYZAAR) 50-12.5 MG tablet Take 1 tablet by mouth once daily 90 tablet 0     nabumetone (RELAFEN) 750 MG tablet 1 tablet twice a day for 10 days and then twice a day only as needed 60 tablet 1     traZODone (DESYREL) 100 MG tablet TAKE 2 TABLETS BY MOUTH AT BEDTIME. APPOINTMENT NEEDED FOR FURTHER REFILLS 180 tablet 1               Allergies as of 06/16/2020     (No Known Allergies)     Current Outpatient Medications   Medication     albuterol (PROAIR HFA/PROVENTIL HFA/VENTOLIN HFA) 108 (90 Base) MCG/ACT inhaler     ALPRAZolam (XANAX) 0.25 MG tablet     benzonatate (TESSALON) 200 MG capsule     BREO ELLIPTA 200-25 MCG/INH Inhaler     citalopram (CELEXA) 20 MG tablet     guaiFENesin-codeine (ROBITUSSIN AC) 100-10 MG/5ML solution     losartan-hydrochlorothiazide (HYZAAR) 50-12.5 MG tablet     nabumetone (RELAFEN) 750 MG tablet     traZODone (DESYREL) 100 MG tablet     Current Facility-Administered Medications   Medication     atropine injection 0.4 mg     Facility-Administered Medications Ordered in Other Visits   Medication     DOBUTamine 500 mg in dextrose 5% 250 mL (adult std conc) premix     metoprolol (LOPRESSOR) injection 5 mg         Examination:  There were no vitals taken for this visit.  General:  no distress.    I had the patient  lay flat on her back and her  raise her right leg by the ankle until she could not go any further. She complained of pain in the right lower back but no radiation of pain into the leg.      ASSESSMENT/IMPRESSION:  musculoskeletal low back pain    PLAN:    The patient and I discussed the absolute importance of continuing to do range of motion exercises and strengthening exercises despite the immediate discomfort that it may cause.We discussed the use of non steroidal anti-inflammatory drugs and muscle relaxants to help them in this goal. Heat therapy in the morning to improve range of motion was emphasized. The patient was advised only to use ice at the end of the day for pain relief if needed.    We will start the patient on vicodin as needed   She was advised to still use the nabumetone as instructed on the bottle and to use the flexeril as needed for stiffness and pain.   She will follow up with me next week  Note for work was written

## 2020-06-16 NOTE — LETTER
[facility]  [address] [facility] [address]  Phone: [phone]    June 16, 2020        Ludy Ramos  [address] [facility] [address]          To whom it may concern:    RE: Ludy Ramos    Patient was seen and treated today at our clinic and missed work. She will be unable to perform her job this week and likely not next week as well.     Please contact me for questions or concerns.      Sincerely,        Ace Steinberg MD

## 2020-06-24 ENCOUNTER — ANCILLARY PROCEDURE (OUTPATIENT)
Dept: GENERAL RADIOLOGY | Facility: CLINIC | Age: 65
End: 2020-06-24
Attending: FAMILY MEDICINE
Payer: COMMERCIAL

## 2020-06-24 ENCOUNTER — OFFICE VISIT (OUTPATIENT)
Dept: FAMILY MEDICINE | Facility: CLINIC | Age: 65
End: 2020-06-24
Payer: COMMERCIAL

## 2020-06-24 VITALS
TEMPERATURE: 97.8 F | HEART RATE: 90 BPM | BODY MASS INDEX: 28.93 KG/M2 | HEIGHT: 66 IN | DIASTOLIC BLOOD PRESSURE: 82 MMHG | WEIGHT: 180 LBS | OXYGEN SATURATION: 96 % | SYSTOLIC BLOOD PRESSURE: 122 MMHG

## 2020-06-24 DIAGNOSIS — M54.50 ACUTE RIGHT-SIDED LOW BACK PAIN WITHOUT SCIATICA: ICD-10-CM

## 2020-06-24 DIAGNOSIS — S32.028D OTHER CLOSED FRACTURE OF SECOND LUMBAR VERTEBRA WITH ROUTINE HEALING, SUBSEQUENT ENCOUNTER: ICD-10-CM

## 2020-06-24 DIAGNOSIS — Z12.11 SCREEN FOR COLON CANCER: Primary | ICD-10-CM

## 2020-06-24 PROCEDURE — 99214 OFFICE O/P EST MOD 30 MIN: CPT | Performed by: FAMILY MEDICINE

## 2020-06-24 PROCEDURE — 72100 X-RAY EXAM L-S SPINE 2/3 VWS: CPT

## 2020-06-24 RX ORDER — OXYCODONE AND ACETAMINOPHEN 5; 325 MG/1; MG/1
1 TABLET ORAL EVERY 6 HOURS PRN
Qty: 45 TABLET | Refills: 0 | Status: SHIPPED | OUTPATIENT
Start: 2020-06-24 | End: 2020-07-01

## 2020-06-24 ASSESSMENT — MIFFLIN-ST. JEOR: SCORE: 1374.25

## 2020-06-24 ASSESSMENT — PAIN SCALES - GENERAL: PAINLEVEL: EXTREME PAIN (8)

## 2020-06-24 NOTE — LETTER
[facility]  [address] [facility] [address]  Phone: [phone]    June 24, 2020        Ludy Ramos  [address]  [facility] [address]          To whom it may concern:    RE: Ludy Ramos    Patient was seen and treated today at our clinic and missed work. She will be unable to return to work at this time. She will be reevaluated in 4 weeks to determine her readiness for work.     Please contact me for questions or concerns.      Sincerely,        Ace Steinberg MD

## 2020-06-24 NOTE — NURSING NOTE
"Chief Complaint   Patient presents with     Back Pain     lower back still the same     Health Maintenance     order pended, wellness visit, PHQ2       Initial /82   Pulse 90   Temp 97.8  F (36.6  C) (Tympanic)   Ht 1.67 m (5' 5.75\")   Wt 81.6 kg (180 lb)   SpO2 96%   BMI 29.27 kg/m   Estimated body mass index is 29.27 kg/m  as calculated from the following:    Height as of this encounter: 1.67 m (5' 5.75\").    Weight as of this encounter: 81.6 kg (180 lb).  Medication Reconciliation: complete  Pauline Leiva, CISCO  "

## 2020-06-24 NOTE — PATIENT INSTRUCTIONS
Every morning when you wake up take a hot shower and get the water on the affected area of your back  After you feel warmed up start stretching your back.   There are six stretches to do which include:  Bend forward  Bend  backwards  Bend to the left side  Bend  to the right side  Rotate shoulders to the left   Rotate shoulders to the right    A good stretch is for a minimum of 30 seconds    Repeat this during the day several times.

## 2020-06-24 NOTE — PROGRESS NOTES
SUBJECTIVE:  Ludy Ramos is a 64 year old female who is seen for right lower  back injury that occurred 12 day(s) ago. The onset of the pain was sudden.  The pain started after an injury in which the patient was getting into bed and she misjudged it and fell   She hit the bed and slipped to her left and her shoulder hit the end table and then she hit the floor.     Her back and shoulder hurt right away,   She continue(s) to have pain in the right lower back and it is not significant(ly) better.     Her shoulder is feeling better    The vicodin and flexeril have only helped minimally.         The patient reports that the pain radiates into the right buttocks and up the side a little bit. There is pain with a deep breath, sneezing and coughing.       The patient denies incontinence of urine or stool.    The patient reports a prior history of problems with her back. These problems were  recurrent self limited episodes of low back pain in the past.  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    It is about an 8 and is no better than when it first started .        The patient reports that she does gardening and yard work   The patients past medical, surgical, social and family histories were reviewed.    She is mpt intere in vertebroplpast    Social History     Socioeconomic History     Marital status:      Spouse name: None     Number of children: None     Years of education: None     Highest education level: None   Occupational History     None   Social Needs     Financial resource strain: None     Food insecurity     Worry: None     Inability: None     Transportation needs     Medical: None     Non-medical: None   Tobacco Use     Smoking status: Former Smoker     Packs/day: 0.00     Years: 20.00     Pack years: 0.00     Start date: 1980     Last attempt to quit: 2001     Years since quittin.5     Smokeless tobacco: Never Used    Substance and Sexual Activity     Alcohol use: Yes     Alcohol/week: 10.0 standard drinks     Drug use: No     Sexual activity: Yes     Partners: Male     Birth control/protection: Surgical     Comment: Hyst   Lifestyle     Physical activity     Days per week: None     Minutes per session: None     Stress: None   Relationships     Social connections     Talks on phone: None     Gets together: None     Attends Zoroastrianism service: None     Active member of club or organization: None     Attends meetings of clubs or organizations: None     Relationship status: None     Intimate partner violence     Fear of current or ex partner: None     Emotionally abused: None     Physically abused: None     Forced sexual activity: None   Other Topics Concern     Parent/sibling w/ CABG, MI or angioplasty before 65F 55M? No   Social History Narrative     None     Past Medical History:   Diagnosis Date     Generalized anxiety disorder      Hypertension goal BP (blood pressure) < 140/90      Insomnia      Intermittent asthma      Menopausal state      Osteopenia      Current Outpatient Medications   Medication Sig Dispense Refill     albuterol (PROAIR HFA/PROVENTIL HFA/VENTOLIN HFA) 108 (90 Base) MCG/ACT inhaler Inhale 2 puffs into the lungs every 4 hours as needed for shortness of breath / dyspnea 17 g 3     ALPRAZolam (XANAX) 0.25 MG tablet Take 1 tablet (0.25 mg) by mouth 3 times daily as needed for anxiety (1 tablet 30-60 minutes before your flight) 6 tablet 0     BREO ELLIPTA 200-25 MCG/INH Inhaler Inhale 1 puff by mouth once daily 3 Inhaler 0     citalopram (CELEXA) 20 MG tablet Take 1 tablet by mouth once daily 90 tablet 0     cyclobenzaprine (FLEXERIL) 10 MG tablet Take 1 tablet (10 mg) by mouth 3 times daily as needed for muscle spasms 30 tablet 1     HYDROcodone-acetaminophen (NORCO) 5-325 MG tablet Take 1 tablet by mouth every 6 hours as needed for pain Do not take within 8 hours of taking a xanax (alprazolam) 30 tablet 0  "    losartan-hydrochlorothiazide (HYZAAR) 50-12.5 MG tablet Take 1 tablet by mouth once daily 90 tablet 0     nabumetone (RELAFEN) 750 MG tablet 1 tablet twice a day for 10 days and then twice a day only as needed 60 tablet 1     traZODone (DESYREL) 100 MG tablet TAKE 2 TABLETS BY MOUTH AT BEDTIME. APPOINTMENT NEEDED FOR FURTHER REFILLS 180 tablet 1               Allergies as of 06/24/2020     (No Known Allergies)     Current Outpatient Medications   Medication     albuterol (PROAIR HFA/PROVENTIL HFA/VENTOLIN HFA) 108 (90 Base) MCG/ACT inhaler     ALPRAZolam (XANAX) 0.25 MG tablet     BREO ELLIPTA 200-25 MCG/INH Inhaler     citalopram (CELEXA) 20 MG tablet     cyclobenzaprine (FLEXERIL) 10 MG tablet     HYDROcodone-acetaminophen (NORCO) 5-325 MG tablet     losartan-hydrochlorothiazide (HYZAAR) 50-12.5 MG tablet     nabumetone (RELAFEN) 750 MG tablet     traZODone (DESYREL) 100 MG tablet     Current Facility-Administered Medications   Medication     atropine injection 0.4 mg     Facility-Administered Medications Ordered in Other Visits   Medication     DOBUTamine 500 mg in dextrose 5% 250 mL (adult std conc) premix     metoprolol (LOPRESSOR) injection 5 mg         Examination:  /82   Pulse 90   Temp 97.8  F (36.6  C) (Tympanic)   Ht 1.67 m (5' 5.75\")   Wt 81.6 kg (180 lb)   SpO2 96%   BMI 29.27 kg/m    General: healthy, alert and mild distress with movement  Neuro exam of the lower extremities.   DTR's  Right knee 2+  Right ankle 1+  Left knee 2+  Left ankle1+  Strength was +5 globally in the lower extremities.  Back examination: There was tenderness to palpation of the lumbar vertebrae and right paraspinal muscles in the lower lumbar region.  There was not CVA tenderness bilaterally.  Straight leg raise test was negative bilaterally.  There was decreased active range of motion in hip flexion bilaterally      [place]s test: negative  (a positive test suggests piriformis syndrome, it is performed by " placing the hip in extension and internal rotation, and then resisting external rotation. Pain or sciatic symptoms denotes a positive test [26].   Jeana's Test : negative bilateral(ly)        Narrative & Impression      LUMBAR SPINE TWO-THREE  VIEWS  6/24/2020 10:10 AM      HISTORY: lower vertebral tenderness on exam after trauma, hx of  osteopenia; Acute right-sided low back pain without sciatica     COMPARISON: Film dated 11/23/2010     FINDINGS: There are age indeterminant compression fractures of T12 in  the superior endplate of L2. These are new since 11/23/2010. There is  loss of disc space height at L5-S1..                                                                      IMPRESSION:   1. Age indeterminant compression fractures of T12 and L1. These are  new since 2010.  2. Degenerative change at L5-S1.     RADHA RBOWN MD           ASSESSMENT/IMPRESSION:  a vertebral fracture of the L2 vertebrae    The T12 area is not tender so I doubt a new fracture is there    PLAN:    The patient and I discussed the absolute importance of continuing to do range of motion exercises and strengthening exercises despite the immediate discomfort that it may cause.  Prescription(s) for oxycodone written  She can call for a refill(s) if needed.   We discussed a vertebropplasty and the patient is not interested in that at this point   I asked that she follow up with me in a month(s) if she is still having significant(ly) pain.

## 2020-06-25 ASSESSMENT — ASTHMA QUESTIONNAIRES: ACT_TOTALSCORE: 20

## 2020-07-01 DIAGNOSIS — S32.028D OTHER CLOSED FRACTURE OF SECOND LUMBAR VERTEBRA WITH ROUTINE HEALING, SUBSEQUENT ENCOUNTER: ICD-10-CM

## 2020-07-01 RX ORDER — OXYCODONE AND ACETAMINOPHEN 5; 325 MG/1; MG/1
1 TABLET ORAL EVERY 6 HOURS PRN
Qty: 45 TABLET | Refills: 0 | Status: SHIPPED | OUTPATIENT
Start: 2020-07-01 | End: 2020-07-08

## 2020-07-01 NOTE — TELEPHONE ENCOUNTER
Controlled Substance Refill Request for percocet  Problem List Complete:  No     Last Written Prescription Date:  6/24/20  Last Fill Quantity: 45,   # refills: 0    Last Office Visit with [facility] primary care provider: 6/24/20    Future Office visit:   Next 5 appointments (look out 90 days)    Jul 22, 2020 11:00 AM CDT  Office Visit with Ace Steinberg MD  [facility] [address] [facility] [address] [phone]          Controlled substance agreement:   Encounter-Level CSA:    There are no encounter-level csa.     Patient-Level CSA:    There are no patient-level csa.         Last Urine Drug Screen: No results found for: CDAUT, No results found for: COMDAT, No results found for: THC13, PCP13, COC13, MAMP13, OPI13, AMP13, BZO13, TCA13, MTD13, BAR13, OXY13, PPX13, BUP13     Processing:  Rx to be electronically transmitted to pharmacy by provider      [email or web address]       checked in past 3 months?  Yes 7/1/2020   Carolyn LIZARRAGAN, RN

## 2020-07-08 ENCOUNTER — MYC REFILL (OUTPATIENT)
Dept: FAMILY MEDICINE | Facility: CLINIC | Age: 65
End: 2020-07-08

## 2020-07-08 DIAGNOSIS — S32.028D OTHER CLOSED FRACTURE OF SECOND LUMBAR VERTEBRA WITH ROUTINE HEALING, SUBSEQUENT ENCOUNTER: ICD-10-CM

## 2020-07-08 NOTE — TELEPHONE ENCOUNTER
Controlled Substance Refill Request for percocet  Problem List Complete:  No      Last Written Prescription Date:  7/1/20  Last Fill Quantity: 45,   # refills: 0     Last Office Visit with [facility] primary care provider: 6/24/20     Future Office visit: 7/20/20       Controlled substance agreement:   Encounter-Level CSA:    There are no encounter-level csa.      Patient-Level CSA:    There are no patient-level csa.           Last Urine Drug Screen: No results found for: CDAUT, No results found for: COMDAT, No results found for: THC13, PCP13, COC13, MAMP13, OPI13, AMP13, BZO13, TCA13, MTD13, BAR13, OXY13, PPX13, BUP13     Processing:  Rx to be electronically transmitted to pharmacy by provider       [email or web address]         checked in past 3 months?  Yes 7/1/2020   Carolyn LIZARRAGAN, RN

## 2020-07-09 RX ORDER — OXYCODONE AND ACETAMINOPHEN 5; 325 MG/1; MG/1
1 TABLET ORAL EVERY 6 HOURS PRN
Qty: 45 TABLET | Refills: 0 | Status: SHIPPED | OUTPATIENT
Start: 2020-07-09 | End: 2020-07-17

## 2020-07-17 DIAGNOSIS — S32.028D OTHER CLOSED FRACTURE OF SECOND LUMBAR VERTEBRA WITH ROUTINE HEALING, SUBSEQUENT ENCOUNTER: ICD-10-CM

## 2020-07-17 NOTE — TELEPHONE ENCOUNTER
Message: Pt is requesting refill. Please indicate whether this is for acute or chronic pain if a new script is sent.

## 2020-07-17 NOTE — TELEPHONE ENCOUNTER
Controlled Substance Refill Request for percocet 5-325 mg  Problem List Complete:  No      Last Written Prescription Date:  7/9/20  Last Fill Quantity: 45,   # refills: 0     Last Office Visit with [facility] primary care provider: 6/24/20     Future Office visit: 7/20/20      Controlled substance agreement:   Encounter-Level CSA:    There are no encounter-level csa.      Patient-Level CSA:    There are no patient-level csa.       Last Urine Drug Screen: No results found for: CDAUT, No results found for: COMDAT, No results found for: THC13, PCP13, COC13, MAMP13, OPI13, AMP13, BZO13, TCA13, MTD13, BAR13, OXY13, PPX13, BUP13     [email or web address]      checked in past 3 months?  Yes 7/1/2020     Aisha LIZARRAGAN, RN

## 2020-07-19 RX ORDER — OXYCODONE AND ACETAMINOPHEN 5; 325 MG/1; MG/1
1 TABLET ORAL EVERY 6 HOURS PRN
Qty: 45 TABLET | Refills: 0 | Status: SHIPPED | OUTPATIENT
Start: 2020-07-19 | End: 2020-07-28

## 2020-07-20 ENCOUNTER — OFFICE VISIT (OUTPATIENT)
Dept: FAMILY MEDICINE | Facility: CLINIC | Age: 65
End: 2020-07-20
Payer: COMMERCIAL

## 2020-07-20 VITALS
DIASTOLIC BLOOD PRESSURE: 74 MMHG | BODY MASS INDEX: 29.6 KG/M2 | OXYGEN SATURATION: 94 % | WEIGHT: 182 LBS | TEMPERATURE: 98.6 F | SYSTOLIC BLOOD PRESSURE: 116 MMHG | HEART RATE: 80 BPM

## 2020-07-20 DIAGNOSIS — G47.00 PERSISTENT INSOMNIA: ICD-10-CM

## 2020-07-20 DIAGNOSIS — Z12.11 SCREEN FOR COLON CANCER: Primary | ICD-10-CM

## 2020-07-20 PROCEDURE — 99213 OFFICE O/P EST LOW 20 MIN: CPT | Performed by: FAMILY MEDICINE

## 2020-07-20 RX ORDER — TRAZODONE HYDROCHLORIDE 100 MG/1
TABLET ORAL
Qty: 180 TABLET | Refills: 1 | Status: SHIPPED | OUTPATIENT
Start: 2020-07-20 | End: 2021-02-11

## 2020-07-20 ASSESSMENT — PAIN SCALES - GENERAL: PAINLEVEL: SEVERE PAIN (7)

## 2020-07-20 NOTE — NURSING NOTE
"Chief Complaint   Patient presents with     Back Pain     lower back     Health Maintenance     order pended, PHQ2       Initial /74   Pulse 80   Temp 98.6  F (37  C) (Tympanic)   Wt 82.6 kg (182 lb)   SpO2 94%   BMI 29.60 kg/m   Estimated body mass index is 29.6 kg/m  as calculated from the following:    Height as of 6/24/20: 1.67 m (5' 5.75\").    Weight as of this encounter: 82.6 kg (182 lb).  Medication Reconciliation: complete  Pauline Leiva, CISCO  "

## 2020-07-20 NOTE — PROGRESS NOTES
Subjective:  Ludy is a 64 year old female who presents today for follow-up on her vertebral fracture. She had a clinic visit on 6-24-20. The pain started 12 day(s) before that appointment. (June 12th)    The patient went to  for symptoms of back pain. The patient did have some diagnostic tests performed. These included: a lumbar xray She was diagnosed with T12 and L1 vertebral fracture . The patient was treated with pain medication.     She takes one tablet roughtly 4 time a day(s)  Her pain is at a 7 without the medication and a 5 with the medication.     The pain was a 10 at first time she was seen.   She is taking oxycodone.  She has a bowel movement every other day where as she used to have one daily . She does have to strain a little bit to pass her bowel movements  She is taking ducolax now.    Her back can tighten up after sitting for a period of time.     She has been able to walk around [place].             Current Outpatient Medications:      albuterol (PROAIR HFA/PROVENTIL HFA/VENTOLIN HFA) 108 (90 Base) MCG/ACT inhaler, Inhale 2 puffs into the lungs every 4 hours as needed for shortness of breath / dyspnea, Disp: 17 g, Rfl: 3     ALPRAZolam (XANAX) 0.25 MG tablet, Take 1 tablet (0.25 mg) by mouth 3 times daily as needed for anxiety (1 tablet 30-60 minutes before your flight), Disp: 6 tablet, Rfl: 0     BREO ELLIPTA 200-25 MCG/INH Inhaler, Inhale 1 puff by mouth once daily, Disp: 3 Inhaler, Rfl: 0     citalopram (CELEXA) 20 MG tablet, Take 1 tablet by mouth once daily, Disp: 90 tablet, Rfl: 0     cyclobenzaprine (FLEXERIL) 10 MG tablet, Take 1 tablet (10 mg) by mouth 3 times daily as needed for muscle spasms, Disp: 30 tablet, Rfl: 1     HYDROcodone-acetaminophen (NORCO) 5-325 MG tablet, Take 1 tablet by mouth every 6 hours as needed for pain Do not take within 8 hours of taking a xanax (alprazolam), Disp: 30 tablet, Rfl: 0     losartan-hydrochlorothiazide (HYZAAR) 50-12.5 MG tablet, Take 1 tablet by mouth  once daily, Disp: 90 tablet, Rfl: 0     nabumetone (RELAFEN) 750 MG tablet, 1 tablet twice a day for 10 days and then twice a day only as needed, Disp: 60 tablet, Rfl: 1     oxyCODONE-acetaminophen (PERCOCET) 5-325 MG tablet, Take 1 tablet by mouth every 6 hours as needed for pain Acute pain, Disp: 45 tablet, Rfl: 0     traZODone (DESYREL) 100 MG tablet, TAKE 2 TABLETS BY MOUTH AT BEDTIME. APPOINTMENT NEEDED FOR FURTHER REFILLS, Disp: 180 tablet, Rfl: 1    Current Facility-Administered Medications:      atropine injection 0.4 mg, 0.4 mg, Intravenous, Once, Francis Lai MD    Facility-Administered Medications Ordered in Other Visits:      DOBUTamine 500 mg in dextrose 5% 250 mL (adult std conc) premix, 2.5-20 mcg/kg/min, Intravenous, Continuous, Francis Lai MD, Stopped at 05/23/19 1400     metoprolol (LOPRESSOR) injection 5 mg, 5 mg, Intravenous, Q5 Min PRN, Francis Lai MD, 1 mg at 05/23/19 1406    Past Medical History:   Diagnosis Date     Generalized anxiety disorder      Hypertension goal BP (blood pressure) < 140/90      Insomnia      Intermittent asthma      Menopausal state      Osteopenia        OBJECTIVE:  /74   Pulse 80   Temp 98.6  F (37  C) (Tympanic)   Wt 82.6 kg (182 lb)   SpO2 94%   BMI 29.60 kg/m    GENERAL APPEARANCE: healthy, alert and no distress    SLR: negative bilateral(ly)   BACK: percussion of upper lumbar spine did cause significant(ly) pain for the patient in comparison to the lower lumbar spine and the CPA     No results found for any visits on 07/20/20.      ASSESSMENT:64 year old  64 year old female with T12 and L1 vertebral fracture.    Her pain appears to have improved.      Plan: I review(ed) UpTo Date in anticipation of this visit. We discussed the indication for vertebroplasty at 6 weeks if the pain is not improving.   She wishes to continue(s) with conservative treatment at this point.      At this time we l continue the present  management. She will call for a refill(s) when needed and let us now how she is doing and how much pain medication she is taking.     I asked that she return to clinic in 6 weeks.    A note to keep the patient out of  work was created.         Ludy  voiced understanding to informations discussed today.

## 2020-07-20 NOTE — LETTER
[facility]  [address] [facility] [address]  Phone: [phone]     2020           Ludy Ramos  [address]  [facility] [address]    1955            To whom it may concern:     RE: Ludy Ramos     Patient was seen and treated today at our clinic in follow up for her vertebral fracture. She will be unable to return to work at this time. She will be reevaluated in 6 weeks to determine her readiness for work.      Please contact me for questions or concerns.        Sincerely,           Ace Steinberg MD

## 2020-07-27 DIAGNOSIS — S32.028D OTHER CLOSED FRACTURE OF SECOND LUMBAR VERTEBRA WITH ROUTINE HEALING, SUBSEQUENT ENCOUNTER: ICD-10-CM

## 2020-07-28 RX ORDER — OXYCODONE AND ACETAMINOPHEN 5; 325 MG/1; MG/1
TABLET ORAL
Qty: 45 TABLET | Refills: 0 | Status: SHIPPED | OUTPATIENT
Start: 2020-07-28 | End: 2020-08-06

## 2020-07-28 NOTE — TELEPHONE ENCOUNTER
Controlled Substance Refill Request for percocet 5-325 mg  Problem List Complete:  No      Last Written Prescription Date:  7/19/20  Last Fill Quantity: 45,   # refills: 0     Last Office Visit with [facility] primary care provider: 7/20/20     Future Office visit: 8/31/20      Controlled substance agreement:   Encounter-Level CSA:    There are no encounter-level csa.      Patient-Level CSA:    There are no patient-level csa.       Last Urine Drug Screen: No results found for: CDAUT, No results found for: COMDAT, No results found for: THC13, PCP13, COC13, MAMP13, OPI13, AMP13, BZO13, TCA13, MTD13, BAR13, OXY13, PPX13, BUP13     [email or web address]      checked in past 3 months?  Yes 7/1/2020      Aisha LIZARRAGAN, RN

## 2020-08-04 DIAGNOSIS — S32.028D OTHER CLOSED FRACTURE OF SECOND LUMBAR VERTEBRA WITH ROUTINE HEALING, SUBSEQUENT ENCOUNTER: ICD-10-CM

## 2020-08-06 RX ORDER — OXYCODONE AND ACETAMINOPHEN 5; 325 MG/1; MG/1
TABLET ORAL
Qty: 45 TABLET | Refills: 0 | Status: SHIPPED | OUTPATIENT
Start: 2020-08-06 | End: 2020-08-31

## 2020-08-06 NOTE — TELEPHONE ENCOUNTER
Controlled Substance Refill Request for percocet 5-325 mg  Problem List Complete:  No      Last Written Prescription Date:  7/28/20  Last Fill Quantity: 45,   # refills: 0     Last Office Visit with [facility] primary care provider: 7/20/20     Future Office visit: 8/31/20      Controlled substance agreement:   Encounter-Level CSA:    There are no encounter-level csa.      Patient-Level CSA:    There are no patient-level csa.       Last Urine Drug Screen: No results found for: CDAUT, No results found for: COMDAT, No results found for: THC13, PCP13, COC13, MAMP13, OPI13, AMP13, BZO13, TCA13, MTD13, BAR13, OXY13, PPX13, BUP13     [email or web address]      checked in past 3 months?  Yes 7/1/2020      Carolyn LIZARRAGAN, RN

## 2020-08-07 ENCOUNTER — TELEPHONE (OUTPATIENT)
Dept: FAMILY MEDICINE | Facility: CLINIC | Age: 65
End: 2020-08-07

## 2020-08-07 NOTE — TELEPHONE ENCOUNTER
Reason for Call:  Other prescription    Detailed comments: pharmacy calling to ask if script for percocet is for chronic pain since this is for more than 7 days   Please call to advice  Thank you       Best Time: any    Can we leave a detailed message on this number? YES    Call taken on 8/7/2020 at 7:22 AM by Ludy [place]

## 2020-08-08 NOTE — TELEPHONE ENCOUNTER
Yes this is for chronic pain secondary to to a vertebral fracture.   I am not sure what needs to happen now except to maybe call her  pharmacy.  Ace Steinberg MD

## 2020-08-10 NOTE — TELEPHONE ENCOUNTER
I called the pharmacy back and left a detailed voicemail for the pharmacy since it is currently closed.  I gave them providers message below that percocet is for chronic pain.     Aisha Izquierdo BSN, RN

## 2020-08-31 ENCOUNTER — OFFICE VISIT (OUTPATIENT)
Dept: FAMILY MEDICINE | Facility: CLINIC | Age: 65
End: 2020-08-31
Payer: COMMERCIAL

## 2020-08-31 VITALS
WEIGHT: 182 LBS | BODY MASS INDEX: 29.6 KG/M2 | HEART RATE: 81 BPM | SYSTOLIC BLOOD PRESSURE: 122 MMHG | DIASTOLIC BLOOD PRESSURE: 80 MMHG | TEMPERATURE: 98.2 F | OXYGEN SATURATION: 96 %

## 2020-08-31 DIAGNOSIS — B37.2 CANDIDAL INTERTRIGO: Primary | ICD-10-CM

## 2020-08-31 DIAGNOSIS — J45.50 SEVERE PERSISTENT ASTHMA WITHOUT COMPLICATION (H): ICD-10-CM

## 2020-08-31 DIAGNOSIS — S32.028D OTHER CLOSED FRACTURE OF SECOND LUMBAR VERTEBRA WITH ROUTINE HEALING, SUBSEQUENT ENCOUNTER: ICD-10-CM

## 2020-08-31 PROCEDURE — 99214 OFFICE O/P EST MOD 30 MIN: CPT | Performed by: FAMILY MEDICINE

## 2020-08-31 RX ORDER — NYSTATIN 100000 U/G
CREAM TOPICAL 2 TIMES DAILY
Qty: 30 G | Refills: 4 | Status: SHIPPED | OUTPATIENT
Start: 2020-08-31

## 2020-08-31 RX ORDER — OXYCODONE AND ACETAMINOPHEN 5; 325 MG/1; MG/1
TABLET ORAL
Qty: 45 TABLET | Refills: 0 | Status: SHIPPED | OUTPATIENT
Start: 2020-08-31 | End: 2020-09-28

## 2020-08-31 ASSESSMENT — PAIN SCALES - GENERAL: PAINLEVEL: MILD PAIN (3)

## 2020-08-31 NOTE — PROGRESS NOTES
SUBJECTIVE:  Ludy Ramos is a 64 year old female who scheduled an appointment to discuss the following issues:  #1 - Data Unavailable    10 week ago she had the sudden onset of back pain   She reports that things are better.     If she sits too long she gets spasms in her back,     After walking a block or so  she gets pain     The pain is typically a 3-4 but when she is walking it can go to a 7 or 8       She can sleep without much pain until she has to move.     She is taking the pain medication about once a day(s) usually at about 4-5 pm  This helps through the evening andby  the morning she is doing well.       She denies side effects from he rmed and reports that she has a bowel movement daily.     She is doing the stretched that were discussed at the last appointment(s).            Any additional relevant history includes:    ROS:  CONSTITUTIONAL: NEGATIVE for fever, chills  GI: NEGATIVE for nausea, abdominal pain, heartburn, or change in bowel habits  : NEGATIVE for frequency, dysuria, or hematuria  C: NEGATIVE for fever, chills, change in weight  I: NEGATIVE for worrisome rashes, moles or lesions  E/M: NEGATIVE for ear, mouth and throat problems  R: NEGATIVE for significant cough or SOB  CV: NEGATIVE for chest pain, palpitations or peripheral edema      Past Medical, social, family histories, medications, and allergies reviewed and updated   ROS: other than that noted above all other review of systems was negative    ROS:       Current Outpatient Medications:      albuterol (PROAIR HFA/PROVENTIL HFA/VENTOLIN HFA) 108 (90 Base) MCG/ACT inhaler, Inhale 2 puffs into the lungs every 4 hours as needed for shortness of breath / dyspnea, Disp: 17 g, Rfl: 3     ALPRAZolam (XANAX) 0.25 MG tablet, Take 1 tablet (0.25 mg) by mouth 3 times daily as needed for anxiety (1 tablet 30-60 minutes before your flight), Disp: 6 tablet, Rfl: 0     BREO ELLIPTA 200-25 MCG/INH Inhaler, Inhale 1 puff by mouth once daily, Disp:  3 Inhaler, Rfl: 0     citalopram (CELEXA) 20 MG tablet, Take 1 tablet by mouth once daily, Disp: 90 tablet, Rfl: 0     losartan-hydrochlorothiazide (HYZAAR) 50-12.5 MG tablet, Take 1 tablet by mouth once daily, Disp: 90 tablet, Rfl: 0     traZODone (DESYREL) 100 MG tablet, TAKE 2 TABLETS BY MOUTH AT BEDTIME. APPOINTMENT NEEDED FOR FURTHER REFILLS, Disp: 180 tablet, Rfl: 1  No current facility-administered medications for this visit.     Facility-Administered Medications Ordered in Other Visits:      DOBUTamine 500 mg in dextrose 5% 250 mL (adult std conc) premix, 2.5-20 mcg/kg/min, Intravenous, Continuous, Francis Lai MD, Stopped at 05/23/19 1400     metoprolol (LOPRESSOR) injection 5 mg, 5 mg, Intravenous, Q5 Min PRN, Francis Lai MD, 1 mg at 05/23/19 1406    OBJECTIVE:  /80   Pulse 81   Temp 98.2  F (36.8  C) (Tympanic)   Wt 82.6 kg (182 lb)   SpO2 96%   BMI 29.60 kg/m      EXAM:  GENERAL APPEARANCE: healthy, alert and no distress  EYES: EOMI,  PERRL  HENT: ear canals and TM's normal and nose and mouth without ulcers or lesions  RESP: lungs clear to auscultation - no rales, rhonchi or wheezes  CV: regular rates and rhythm, normal S1 S2, no S3 or S4 and no murmur, click or rub -  ABDOMEN:  soft, nontender, no HSM or masses and bowel sounds normal  NEURO: Normal strength in the lower extremity(ies)   RANGE OF MOTION grossly normal to back flexion, extension  , bilateral(ly) lateral flexion and rotation.  No results found for any visits on 08/31/20.      ASSESSMENT/PLAN:      (S32.028D) Other closed fracture of second lumbar vertebra with routine healing, subsequent encounter  Comment:   Plan: oxyCODONE-acetaminophen (PERCOCET) 5-325 MG         Tablet  Continue(s) RANGE OF MOTION  exercises              (B37.2) Candidal intertrigo  (primary encounter diagnosis)  Comment:   Plan: nystatin (MYCOSTATIN) 567006 UNIT/GM external         cream                    (J45.50) Severe  persistent asthma without complication  Comment:   Plan: ALLERGY/ASTHMA ADULT REFERRAL

## 2020-08-31 NOTE — PATIENT INSTRUCTIONS
Your provider has referred you to: [facility]: [facility]  [phone] [email or web address]

## 2020-09-01 DIAGNOSIS — F41.1 GAD (GENERALIZED ANXIETY DISORDER): ICD-10-CM

## 2020-09-01 RX ORDER — CITALOPRAM HYDROBROMIDE 20 MG/1
TABLET ORAL
Qty: 90 TABLET | Refills: 0 | Status: SHIPPED | OUTPATIENT
Start: 2020-09-01 | End: 2020-12-09

## 2020-09-01 NOTE — TELEPHONE ENCOUNTER
This patient was seen within the last 30 days.  Please review the medication being requested and refill if you find appropriate.  Thank you. Chayo Ruffin R.N.

## 2020-09-03 ENCOUNTER — OFFICE VISIT (OUTPATIENT)
Dept: ALLERGY | Facility: CLINIC | Age: 65
End: 2020-09-03
Payer: COMMERCIAL

## 2020-09-03 VITALS
BODY MASS INDEX: 29.34 KG/M2 | HEART RATE: 77 BPM | DIASTOLIC BLOOD PRESSURE: 84 MMHG | OXYGEN SATURATION: 93 % | WEIGHT: 180.4 LBS | SYSTOLIC BLOOD PRESSURE: 136 MMHG

## 2020-09-03 DIAGNOSIS — J45.50 SEVERE PERSISTENT ASTHMA WITHOUT COMPLICATION (H): Primary | ICD-10-CM

## 2020-09-03 PROBLEM — T78.3XXA ANGIOEDEMA, INITIAL ENCOUNTER: Status: RESOLVED | Noted: 2017-10-02 | Resolved: 2020-09-03

## 2020-09-03 LAB
BASOPHILS # BLD AUTO: 0 10E9/L (ref 0–0.2)
BASOPHILS NFR BLD AUTO: 0.2 %
DIFFERENTIAL METHOD BLD: ABNORMAL
EOSINOPHIL # BLD AUTO: 0.1 10E9/L (ref 0–0.7)
EOSINOPHIL NFR BLD AUTO: 0.7 %
ERYTHROCYTE [DISTWIDTH] IN BLOOD BY AUTOMATED COUNT: 11.9 % (ref 10–15)
HCT VFR BLD AUTO: 42 % (ref 35–47)
HGB BLD-MCNC: 14.5 G/DL (ref 11.7–15.7)
LYMPHOCYTES # BLD AUTO: 2.4 10E9/L (ref 0.8–5.3)
LYMPHOCYTES NFR BLD AUTO: 29.2 %
MCH RBC QN AUTO: 34.8 PG (ref 26.5–33)
MCHC RBC AUTO-ENTMCNC: 34.5 G/DL (ref 31.5–36.5)
MCV RBC AUTO: 101 FL (ref 78–100)
MONOCYTES # BLD AUTO: 0.6 10E9/L (ref 0–1.3)
MONOCYTES NFR BLD AUTO: 7.5 %
NEUTROPHILS # BLD AUTO: 5.1 10E9/L (ref 1.6–8.3)
NEUTROPHILS NFR BLD AUTO: 62.4 %
PLATELET # BLD AUTO: 229 10E9/L (ref 150–450)
RBC # BLD AUTO: 4.17 10E12/L (ref 3.8–5.2)
WBC # BLD AUTO: 8.2 10E9/L (ref 4–11)

## 2020-09-03 PROCEDURE — 85025 COMPLETE CBC W/AUTO DIFF WBC: CPT | Performed by: ALLERGY & IMMUNOLOGY

## 2020-09-03 PROCEDURE — 82785 ASSAY OF IGE: CPT | Performed by: ALLERGY & IMMUNOLOGY

## 2020-09-03 PROCEDURE — 99214 OFFICE O/P EST MOD 30 MIN: CPT | Performed by: ALLERGY & IMMUNOLOGY

## 2020-09-03 PROCEDURE — 36415 COLL VENOUS BLD VENIPUNCTURE: CPT | Performed by: ALLERGY & IMMUNOLOGY

## 2020-09-03 PROCEDURE — 86003 ALLG SPEC IGE CRUDE XTRC EA: CPT | Performed by: ALLERGY & IMMUNOLOGY

## 2020-09-03 NOTE — PROGRESS NOTES
Ludy Ramos is a 64 year old White female with previous medical history significant for asthma. Ludy Ramos is being seen today for evaluation of asthma.     Patient returns for follow-up.  She was last seen in 2017.  At that time she was on Dulera 200/5 mcg 2 puffs inhaled twice daily.  Since that time she has been switched to Breo 200/25 mcg 1 puff daily.  She reports that she has had shortness of breath with physical activity.  She has had wheezing and tightness in chest.  Symptoms additionally flare with upper respiratory tract infections and smoke.  Denies symptoms at rest.  Denies nocturnal symptoms.  No history of allergy testing.  Extensive smoking history.  She stopped smoking 18 years ago.  Office spirometry showed significant obstruction and improvement postbronchodilator at last office visit.  Chest x-ray from January normal.     ENVIRONMENTAL HISTORY: [place] The home is heated with a gas furnace. They does have central air conditioning. The patient's bedroom is furnished with feather/wool bedding or pillows, carpeting in bedroom and fabric window coverings.  Pets inside the house include 2 cat(s). There is a history mice infestation. There is/are 0 smokers in the house.  The house does not have a damp basement.     ACT Total Scores 9/3/2020   ACT TOTAL SCORE -   ASTHMA ER VISITS -   ASTHMA HOSPITALIZATIONS -   ACT TOTAL SCORE (Goal Greater than or Equal to 20) 17   In the past 12 months, how many times did you visit the emergency room for your asthma without being admitted to the hospital? 0   In the past 12 months, how many times were you hospitalized overnight because of your asthma? 0     Past Medical History:   Diagnosis Date     Generalized anxiety disorder      Hypertension goal BP (blood pressure) < 140/90      Insomnia      Intermittent asthma      Menopausal state      Osteopenia      Family History   Problem Relation Age of Onset      Respiratory Mother         copd     Respiratory Father         empysema     Melanoma No family hx of      Past Surgical History:   Procedure Laterality Date     COLONOSCOPY       COLONOSCOPY  5/20/2014    Procedure: COLONOSCOPY;  Surgeon: Jean Hastings MD;  Location: [facility] OR     COLONOSCOPY  5/20/2014    Procedure: COMBINED COLONOSCOPY, SINGLE BIOPSY/POLYPECTOMY BY BIOPSY;  Surgeon: Jean Hastings MD;  Location: [facility]     COLONOSCOPY N/A 5/5/2017    Procedure: COMBINED COLONOSCOPY, SINGLE OR MULTIPLE BIOPSY/POLYPECTOMY BY BIOPSY;;  Surgeon: Duane, William Charles, MD;  Location: [facility] OR     COLONOSCOPY WITH CO2 INSUFFLATION N/A 5/5/2017    Procedure: COLONOSCOPY WITH CO2 INSUFFLATION;  colonoscopy, History of colonic polyps  BMI 25.74  [employer], fax: [phone];  Surgeon: Duane, William Charles, MD;  Location: [facility] OR     FINGER SURGERY      Left 4th digit     HYSTERECTOMY, JONAS       SURGICAL HISTORY OF -       Right 2nd digit     TONSILLECTOMY         REVIEW OF SYSTEMS:  General: negative for weight gain. negative for weight loss. negative for changes in sleep.   Ears: negative for fullness. negative for hearing loss. negative for dizziness.   Nose: negative for snoring.negative for changes in smell. positive  for drainage.   Eyes: negative for eye watering. positive  for eye itching. negative for vision changes. negative for eye redness.  Throat: negative for hoarseness. negative for sore throat. negative for trouble swallowing.   Lungs: positive  for shortness of breath.negative for wheezing. positive  for sputum production.   Cardiovascular: positive  for chest pain. negative for swelling of ankles. positive  for fast or irregular heartbeat.   Gastrointestinal: negative for nausea. negative for heartburn. negative for acid reflux.   Musculoskeletal: negative for joint pain. negative for joint stiffness. negative for joint swelling.   Neurologic: negative for seizures. negative for  fainting. negative for weakness.   Psychiatric: negative for changes in mood. negative for anxiety.   Endocrine: negative for cold intolerance. negative for heat intolerance. negative for tremors.   Lymphatic: negative for lower extremity swelling. negative for lymph node swelling.   Hematologic: positive  for easy bruising. negative for easy bleeding.  Integumentary: negative for rash. negative for scaling. negative for nail changes.       Current Outpatient Medications:      albuterol (PROAIR HFA/PROVENTIL HFA/VENTOLIN HFA) 108 (90 Base) MCG/ACT inhaler, Inhale 2 puffs into the lungs every 4 hours as needed for shortness of breath / dyspnea, Disp: 17 g, Rfl: 3     ALPRAZolam (XANAX) 0.25 MG tablet, Take 1 tablet (0.25 mg) by mouth 3 times daily as needed for anxiety (1 tablet 30-60 minutes before your flight), Disp: 6 tablet, Rfl: 0     BREO ELLIPTA 200-25 MCG/INH Inhaler, Inhale 1 puff by mouth once daily, Disp: 3 Inhaler, Rfl: 0     citalopram (CELEXA) 20 MG tablet, Take 1 tablet by mouth once daily, Disp: 90 tablet, Rfl: 0     losartan-hydrochlorothiazide (HYZAAR) 50-12.5 MG tablet, Take 1 tablet by mouth once daily, Disp: 90 tablet, Rfl: 0     nystatin (MYCOSTATIN) 728582 UNIT/GM external cream, Apply topically 2 times daily, Disp: 30 g, Rfl: 4     oxyCODONE-acetaminophen (PERCOCET) 5-325 MG tablet, Take 1 tablet by mouth every 6 hours as needed for pain Do not take with in 6 hours of taking alprazolam, Disp: 45 tablet, Rfl: 0     tiotropium (SPIRIVA RESPIMAT) 2.5 MCG/ACT inhaler, Inhale 2 puffs into the lungs daily, Disp: 1 Inhaler, Rfl: 3     traZODone (DESYREL) 100 MG tablet, TAKE 2 TABLETS BY MOUTH AT BEDTIME. APPOINTMENT NEEDED FOR FURTHER REFILLS, Disp: 180 tablet, Rfl: 1  No current facility-administered medications for this visit.     Facility-Administered Medications Ordered in Other Visits:      DOBUTamine 500 mg in dextrose 5% 250 mL (adult std conc) premix, 2.5-20 mcg/kg/min, Intravenous,  Continuous, Francis Lai MD, Stopped at 05/23/19 1400     metoprolol (LOPRESSOR) injection 5 mg, 5 mg, Intravenous, Q5 Min PRN, Francis Lai MD, 1 mg at 05/23/19 1406  Immunization History   Administered Date(s) Administered     HEPA 10/30/2006, 05/24/2007     Influenza (IIV3) PF 01/19/2011, 12/04/2012     Influenza Vaccine, 6+MO IM (QUADRIVALENT W/PRESERVATIVES) 12/23/2014     Mantoux Tuberculin Skin Test 06/26/2013, 07/17/2019     TD (ADULT, 7+) 08/12/2005     TDAP Vaccine (Adacel) 09/10/2015     Allergies   Allergen Reactions     Lisinopril      angioedema         EXAM:   Constitutional:  Appears well-developed and well-nourished. No distress.   HEENT:   Head: Normocephalic.   Nasal tissue pink and normal appearing.  No rhinorrhea noted.    Eyes: Conjunctivae are non-erythematous   Cardiovascular: Normal rate, regular rhythm and normal heart sounds. Exam reveals no gallop and no friction rub.   No murmur heard.  Respiratory: Breath sounds diminished. No respiratory distress. No wheezes. No rales.   Musculoskeletal: Normal range of motion.   Neuro: Oriented to person, place, and time.  Skin: Skin is warm and dry. No rash noted.   Psychiatric: Normal mood and affect.     Nursing note and vitals reviewed.    ASSESSMENT/PLAN:  Problem List Items Addressed This Visit        Respiratory    Severe persistent asthma without complication - Primary     Daily shortness of breath, wheezing. Symptoms with exertion. Symptoms with smoke, humid air and URI. Currently on Breo 200/25mcg 1 puff daily. Ventolin use 1-2 times per day and beneficial. Extensive smoking history. Chest x-ray without emphysema and was otherwise normal. Oxygen saturation of 93%.      Spirometry with severe obstruction that improved significantly post bronchodilator in 2017. Cannot do in office spirometry today given COVID.      - An asthma action plan was provided and discussed with patient and family.   - Albuterol 2-4 puffs  inhaled (use a spacer unless using a Proair Respiclick device) every 4 hours as needed for chest tightness, wheezing, shortness of breath and/or coughing.   - Albuterol 2-4 puffs inhaled (use spacer if not using Proair Respiclick device) 15-20 minutes prior to physical activity.   - Please ensure warm up period prior to exercise.   - Avoid asthma triggers.  - Breo 200/25mcg 1 puff inhaled daily.  - Start Spiriva 2.5mcg 2 puffs daily.   - CBC with diff, IgE and allergy testing.   - Sending for complete pulmonary function studies. Additionally will get pulse oximetry at rest and with exertion.            Relevant Medications    tiotropium (SPIRIVA RESPIMAT) 2.5 MCG/ACT inhaler    Other Relevant Orders    IgE (Completed)    CBC with platelets differential (Completed)    Allergen cat epithellium IgE (Completed)    Allergen rhiannon IgE (Completed)    Allergen D pteronyssinus IgE (Completed)    Allergen D farinae IgE (Completed)    Allergen alternaria alternata IgE (Completed)    Allergen aspergillus fumigatus IgE (Completed)    Allergen maple box elder IgE (Completed)    Allergen silver  birch IgE (Completed)    Allergen oak white IgE (Completed)    Allergen giant ragweed IgE (Completed)    Pulmonary Function Test    PULSE OXIMETRY, MULTIPLE DETERMINATIONS          Chart documentation with Dragon Voice recognition Software. Although reviewed after completion, some words and grammatical errors may remain.    Nakul Dubois DO FAAAAI  Medical Director for Allergy/Immunology at [place], [place]

## 2020-09-03 NOTE — LETTER
My Asthma Action Plan    Name: Ludy Ramos   YOB: 1955  Date: 9/3/2020   My doctor: Nakul Dubois, DO   My clinic: [facility]        My Control Medicine: Fluticasone furoate + vilanterol (Breo Ellipta)  -  200/25mcg 1 puffs daily  Tiotropium bromide (Spiriva Respimat) -  2.5 mcg 2 puffs daily  My Rescue Medicine: Albuterol (Proair/Ventolin/Proventil HFA) 2-4 puffs EVERY 4 HOURS as needed. Use a spacer if recommended by your provider.  My Oral Steroid Medicine: None My Asthma Severity:   Severe Persistent  Know your asthma triggers: upper respiratory infections and humidity               GREEN ZONE   Good Control    I feel good    No cough or wheeze    Can work, sleep and play without asthma symptoms       Take your asthma control medicine every day.     1. If exercise triggers your asthma, take your rescue medication    15 minutes before exercise or sports, and    During exercise if you have asthma symptoms  2. Spacer to use with inhaler: If you have a spacer, make sure to use it with your inhaler             YELLOW ZONE Getting Worse  I have ANY of these:    I do not feel good    Cough or wheeze    Chest feels tight    Wake up at night   1. Keep taking your Green Zone medications  2. Start taking your rescue medicine:    every 20 minutes for up to 1 hour. Then every 4 hours for 24-48 hours.  3. If you stay in the Yellow Zone for more than 12-24 hours, contact your doctor.  4. If you do not return to the Green Zone in 12-24 hours or you get worse, start taking your oral steroid medicine if prescribed by your provider.           RED ZONE Medical Alert - Get Help  I have ANY of these:    I feel awful    Medicine is not helping    Breathing getting harder    Trouble walking or talking    Nose opens wide to breathe       1. Take your rescue medicine NOW  2. If your provider has prescribed an oral steroid medicine, start taking it NOW  3. Call your doctor NOW  4. If you are still in  the Red Zone after 20 minutes and you have not reached your doctor:    Take your rescue medicine again and    Call 911 or go to the emergency room right away    See your regular doctor within 2 weeks of an Emergency Room or Urgent Care visit for follow-up treatment.          Annual Reminders:  Meet with Asthma Educator,  Flu Shot in the Fall, consider Pneumonia Vaccination for patients with asthma (aged 19 and older).    Pharmacy:    [id number] - [place], [place] - [address] [id number] - [place], [place] - [address] [address]     Electronically signed by Nakul Dubois, DO   Date: 09/03/20                      Asthma Triggers  How To Control Things That Make Your Asthma Worse    Triggers are things that make your asthma worse.  Look at the list below to help you find your triggers and what you can do about them.  You can help prevent asthma flare-ups by staying away from your triggers.      Trigger                                                          What you can do   Cigarette Smoke  Tobacco smoke can make asthma worse. Do not allow smoking in your home, car or around you.  Be sure no one smokes at a child s day care or school.  If you smoke, ask your health care provider for ways to help you quit.  Ask family members to quit too.  Ask your health care provider for a referral to Quit Plan to help you quit smoking, or call 4-779-235-PLAN.     Colds, Flu, Bronchitis  These are common triggers of asthma. Wash your hands often.  Don t touch your eyes, nose or mouth.  Get a flu shot every year.     Dust Mites  These are tiny bugs that live in cloth or carpet. They are too small to see. Wash sheets and blankets in hot water every week.   Encase pillows and mattress in dust mite proof covers.  Avoid having carpet if you can. If you have carpet, vacuum weekly.   Use a dust mask and HEPA vacuum.   Pollen and Outdoor Mold  Some people are allergic to trees, grass, or weed pollen, or molds.  Try to keep your windows closed.  Limit time out doors when pollen count is high.   Ask you health care provider about taking medicine during allergy season.     Animal Dander  Some people are allergic to skin flakes, urine or saliva from pets with fur or feathers. Keep pets with fur or feathers out of your home.    If you can t keep the pet outdoors, then keep the pet out of your bedroom.  Keep the bedroom door closed.  Keep pets off cloth furniture and away from stuffed toys.     Mice, Rats, and Cockroaches   Some people are allergic to the waste from these pests.   Cover food and garbage.  Clean up spills and food crumbs.  Store grease in the refrigerator.   Keep food out of the bedroom.   Indoor Mold  This can be a trigger if your home has high moisture. Fix leaking faucets, pipes, or other sources of water.   Clean moldy surfaces.  Dehumidify basement if it is damp and smelly.   Smoke, Strong Odors, and Sprays  These can reduce air quality. Stay away from strong odors and sprays, such as perfume, powder, hair spray, paints, smoke incense, paint, cleaning products, candles and new carpet.   Exercise or Sports  Some people with asthma have this trigger. Be active!  Ask your doctor about taking medicine before sports or exercise to prevent symptoms.    Warm up for 5-10 minutes before and after sports or exercise.     Other Triggers of Asthma  Cold air:  Cover your nose and mouth with a scarf.  Sometimes laughing or crying can be a trigger.  Some medicines and food can trigger asthma.

## 2020-09-03 NOTE — LETTER
9/3/2020         RE: Ludy Ramos  [address]  [place] [address]        Dear Colleague,    Thank you for referring your patient, Ludy Ramos, to the [facility]. Please see a copy of my visit note below.    Ludy Ramos is a 64 year old White female with previous medical history significant for asthma. Ludy Ramos is being seen today for evaluation of asthma.     Patient returns for follow-up.  She was last seen in 2017.  At that time she was on Dulera 200/5 mcg 2 puffs inhaled twice daily.  Since that time she has been switched to Breo 200/25 mcg 1 puff daily.  She reports that she has had shortness of breath with physical activity.  She has had wheezing and tightness in chest.  Symptoms additionally flare with upper respiratory tract infections and smoke.  Denies symptoms at rest.  Denies nocturnal symptoms.  No history of allergy testing.  Extensive smoking history.  She stopped smoking 18 years ago.  Office spirometry showed significant obstruction and improvement postbronchodilator at last office visit.  Chest x-ray from January normal.     ENVIRONMENTAL HISTORY: [place] The home is heated with a gas furnace. They does have central air conditioning. The patient's bedroom is furnished with feather/wool bedding or pillows, carpeting in bedroom and fabric window coverings.  Pets inside the house include 2 cat(s). There is a history mice infestation. There is/are 0 smokers in the house.  The house does not have a damp basement.     ACT Total Scores 9/3/2020   ACT TOTAL SCORE -   ASTHMA ER VISITS -   ASTHMA HOSPITALIZATIONS -   ACT TOTAL SCORE (Goal Greater than or Equal to 20) 17   In the past 12 months, how many times did you visit the emergency room for your asthma without being admitted to the hospital? 0   In the past 12 months, how many times were you hospitalized overnight because of your asthma? 0     Past Medical  History:   Diagnosis Date     Generalized anxiety disorder      Hypertension goal BP (blood pressure) < 140/90      Insomnia      Intermittent asthma      Menopausal state      Osteopenia      Family History   Problem Relation Age of Onset     Respiratory Mother         copd     Respiratory Father         empysema     Melanoma No family hx of      Past Surgical History:   Procedure Laterality Date     COLONOSCOPY       COLONOSCOPY  5/20/2014    Procedure: COLONOSCOPY;  Surgeon: Jean Hastings MD;  Location: [facility] OR     COLONOSCOPY  5/20/2014    Procedure: COMBINED COLONOSCOPY, SINGLE BIOPSY/POLYPECTOMY BY BIOPSY;  Surgeon: Jean Hastings MD;  Location: [facility] OR     COLONOSCOPY N/A 5/5/2017    Procedure: COMBINED COLONOSCOPY, SINGLE OR MULTIPLE BIOPSY/POLYPECTOMY BY BIOPSY;;  Surgeon: Duane, William Charles, MD;  Location: [facility] OR     COLONOSCOPY WITH CO2 INSUFFLATION N/A 5/5/2017    Procedure: COLONOSCOPY WITH CO2 INSUFFLATION;  colonoscopy, History of colonic polyps  BMI 25.74  [employer] fax: [phone];  Surgeon: Duane, William Charles, MD;  Location: [facility] OR     FINGER SURGERY      Left 4th digit     HYSTERECTOMY, JONAS       SURGICAL HISTORY OF -       Right 2nd digit     TONSILLECTOMY         REVIEW OF SYSTEMS:  General: negative for weight gain. negative for weight loss. negative for changes in sleep.   Ears: negative for fullness. negative for hearing loss. negative for dizziness.   Nose: negative for snoring.negative for changes in smell. positive  for drainage.   Eyes: negative for eye watering. positive  for eye itching. negative for vision changes. negative for eye redness.  Throat: negative for hoarseness. negative for sore throat. negative for trouble swallowing.   Lungs: positive  for shortness of breath.negative for wheezing. positive  for sputum production.   Cardiovascular: positive  for chest pain. negative for swelling of ankles. positive  for fast or irregular heartbeat.    Gastrointestinal: negative for nausea. negative for heartburn. negative for acid reflux.   Musculoskeletal: negative for joint pain. negative for joint stiffness. negative for joint swelling.   Neurologic: negative for seizures. negative for fainting. negative for weakness.   Psychiatric: negative for changes in mood. negative for anxiety.   Endocrine: negative for cold intolerance. negative for heat intolerance. negative for tremors.   Lymphatic: negative for lower extremity swelling. negative for lymph node swelling.   Hematologic: positive  for easy bruising. negative for easy bleeding.  Integumentary: negative for rash. negative for scaling. negative for nail changes.       Current Outpatient Medications:      albuterol (PROAIR HFA/PROVENTIL HFA/VENTOLIN HFA) 108 (90 Base) MCG/ACT inhaler, Inhale 2 puffs into the lungs every 4 hours as needed for shortness of breath / dyspnea, Disp: 17 g, Rfl: 3     ALPRAZolam (XANAX) 0.25 MG tablet, Take 1 tablet (0.25 mg) by mouth 3 times daily as needed for anxiety (1 tablet 30-60 minutes before your flight), Disp: 6 tablet, Rfl: 0     BREO ELLIPTA 200-25 MCG/INH Inhaler, Inhale 1 puff by mouth once daily, Disp: 3 Inhaler, Rfl: 0     citalopram (CELEXA) 20 MG tablet, Take 1 tablet by mouth once daily, Disp: 90 tablet, Rfl: 0     losartan-hydrochlorothiazide (HYZAAR) 50-12.5 MG tablet, Take 1 tablet by mouth once daily, Disp: 90 tablet, Rfl: 0     nystatin (MYCOSTATIN) 176429 UNIT/GM external cream, Apply topically 2 times daily, Disp: 30 g, Rfl: 4     oxyCODONE-acetaminophen (PERCOCET) 5-325 MG tablet, Take 1 tablet by mouth every 6 hours as needed for pain Do not take with in 6 hours of taking alprazolam, Disp: 45 tablet, Rfl: 0     tiotropium (SPIRIVA RESPIMAT) 2.5 MCG/ACT inhaler, Inhale 2 puffs into the lungs daily, Disp: 1 Inhaler, Rfl: 3     traZODone (DESYREL) 100 MG tablet, TAKE 2 TABLETS BY MOUTH AT BEDTIME. APPOINTMENT NEEDED FOR FURTHER REFILLS, Disp: 180 tablet,  Rfl: 1  No current facility-administered medications for this visit.     Facility-Administered Medications Ordered in Other Visits:      DOBUTamine 500 mg in dextrose 5% 250 mL (adult std conc) premix, 2.5-20 mcg/kg/min, Intravenous, Continuous, Francis Lai MD, Stopped at 05/23/19 1400     metoprolol (LOPRESSOR) injection 5 mg, 5 mg, Intravenous, Q5 Min PRN, Francis Lai MD, 1 mg at 05/23/19 1406  Immunization History   Administered Date(s) Administered     HEPA 10/30/2006, 05/24/2007     Influenza (IIV3) PF 01/19/2011, 12/04/2012     Influenza Vaccine, 6+MO IM (QUADRIVALENT W/PRESERVATIVES) 12/23/2014     Mantoux Tuberculin Skin Test 06/26/2013, 07/17/2019     TD (ADULT, 7+) 08/12/2005     TDAP Vaccine (Adacel) 09/10/2015     Allergies   Allergen Reactions     Lisinopril      angioedema         EXAM:   Constitutional:  Appears well-developed and well-nourished. No distress.   HEENT:   Head: Normocephalic.   Nasal tissue pink and normal appearing.  No rhinorrhea noted.    Eyes: Conjunctivae are non-erythematous   Cardiovascular: Normal rate, regular rhythm and normal heart sounds. Exam reveals no gallop and no friction rub.   No murmur heard.  Respiratory: Breath sounds diminished. No respiratory distress. No wheezes. No rales.   Musculoskeletal: Normal range of motion.   Neuro: Oriented to person, place, and time.  Skin: Skin is warm and dry. No rash noted.   Psychiatric: Normal mood and affect.     Nursing note and vitals reviewed.    ASSESSMENT/PLAN:  Problem List Items Addressed This Visit        Respiratory    Severe persistent asthma without complication - Primary     Daily shortness of breath, wheezing. Symptoms with exertion. Symptoms with smoke, humid air and URI. Currently on Breo 200/25mcg 1 puff daily. Ventolin use 1-2 times per day and beneficial. Extensive smoking history. Chest x-ray without emphysema and was otherwise normal. Oxygen saturation of 93%.      Spirometry with  severe obstruction that improved significantly post bronchodilator in 2017. Cannot do in office spirometry today given COVID.      - An asthma action plan was provided and discussed with patient and family.   - Albuterol 2-4 puffs inhaled (use a spacer unless using a Proair Respiclick device) every 4 hours as needed for chest tightness, wheezing, shortness of breath and/or coughing.   - Albuterol 2-4 puffs inhaled (use spacer if not using Proair Respiclick device) 15-20 minutes prior to physical activity.   - Please ensure warm up period prior to exercise.   - Avoid asthma triggers.  - Breo 200/25mcg 1 puff inhaled daily.  - Start Spiriva 2.5mcg 2 puffs daily.   - CBC with diff, IgE and allergy testing.   - Sending for complete pulmonary function studies. Additionally will get pulse oximetry at rest and with exertion.            Relevant Medications    tiotropium (SPIRIVA RESPIMAT) 2.5 MCG/ACT inhaler    Other Relevant Orders    IgE (Completed)    CBC with platelets differential (Completed)    Allergen cat epithellium IgE (Completed)    Allergen rhiannon IgE (Completed)    Allergen D pteronyssinus IgE (Completed)    Allergen D farinae IgE (Completed)    Allergen alternaria alternata IgE (Completed)    Allergen aspergillus fumigatus IgE (Completed)    Allergen maple box elder IgE (Completed)    Allergen silver  birch IgE (Completed)    Allergen oak white IgE (Completed)    Allergen giant ragweed IgE (Completed)    Pulmonary Function Test    PULSE OXIMETRY, MULTIPLE DETERMINATIONS          Chart documentation with Dragon Voice recognition Software. Although reviewed after completion, some words and grammatical errors may remain.    Nakul Dubois DO FAAAAI  Medical Director for Allergy/Immunology at [place], [place]      Again, thank you for allowing me to participate in the care of your patient.        Sincerely,        Nakul Dubois DO

## 2020-09-03 NOTE — PATIENT INSTRUCTIONS
Allergy Staff Appt Hours Shot Hours Locations    Physician     Nakul Dubois DO       Support Staff     JAIME Owens, CISCO  Tuesday:        [place] 7-4:20     Wednesday:        [place]: 7-5 Thursday:                    [place] 7-6:40     Friday:  [place]  7-2:40   [place]        Thursday: 1-5:50        Friday: 7-10:50     [place]        Tuesday: 7- 3:20        Wednesday: 7-4:20     [place] Monday: 7-4:20        Tuesday: 1-6:20         [facility]  [address] [place] [address]  Appt Line: [phone]  Allergy RN:  [phone]    [facility]  [address] [place] [address]  Appt Line: [phone]  Allergy RN:  [phone]       Important Scheduling Information  Aspirin Desensitization: Appt will last 2 clinic days. Please call the Allergy RN line for your clinic to schedule. Discontinue antihistamines 7 days prior to the appointment.     Food Challenges: Appt will last 3-4 hours. Please call the Allergy RN line for your clinic to schedule. Discontinue antihistamines 7 days prior to the appointment.     Penicillin Testing: Appt will last 2-3 hours. Please call the Allergy RN line for your clinic to schedule. Discontinue antihistamines 7 days prior to the appointment.     Skin Testing: Appt will about 40 minutes. Call the appointment line for your clinic to schedule. Discontinue antihistamines 7 days prior to the appointment.     Venom Testing: Appt will last 2-3 hours. Please call the Allergy RN line for your clinic to schedule. Discontinue antihistamines 7 days prior to the appointment.     Thank you for trusting us with your Allergy, Asthma, and Immunology care. Please feel free to contact us with any questions or concerns you may have.      - Breo 200/25mcg 1 puff inhaled daily.  - Spiriva 2.5mcg 2 puff daily.   - Albuterol 2-4 puffs inhaled (use a spacer unless using a Proair Respiclick device) every 4 hours as needed for chest tightness, wheezing,  shortness of breath and/or coughing.   - Blood testing today.   - Complete pulmonary function studies.

## 2020-09-03 NOTE — ASSESSMENT & PLAN NOTE
Daily shortness of breath, wheezing. Symptoms with exertion. Symptoms with smoke, humid air and URI. Currently on Breo 200/25mcg 1 puff daily. Ventolin use 1-2 times per day and beneficial. Extensive smoking history. Chest x-ray without emphysema and was otherwise normal. Oxygen saturation of 93%.      Spirometry with severe obstruction that improved significantly post bronchodilator in 2017. Cannot do in office spirometry today given COVID.      - An asthma action plan was provided and discussed with patient and family.   - Albuterol 2-4 puffs inhaled (use a spacer unless using a Proair Respiclick device) every 4 hours as needed for chest tightness, wheezing, shortness of breath and/or coughing.   - Albuterol 2-4 puffs inhaled (use spacer if not using Proair Respiclick device) 15-20 minutes prior to physical activity.   - Please ensure warm up period prior to exercise.   - Avoid asthma triggers.  - Breo 200/25mcg 1 puff inhaled daily.  - Start Spiriva 2.5mcg 2 puffs daily.   - CBC with diff, IgE and allergy testing.   - Sending for complete pulmonary function studies. Additionally will get pulse oximetry at rest and with exertion.

## 2020-09-04 LAB
A ALTERNATA IGE QN: <0.1 KU(A)/L
A FUMIGATUS IGE QN: <0.1 KU(A)/L
CAT DANDER IGG QN: <0.1 KU(A)/L
D FARINAE IGE QN: <0.1 KU(A)/L
D PTERONYSS IGE QN: <0.1 KU(A)/L
GIANT RAGWEED IGE QN: <0.1 KU(A)/L
IGE SERPL-ACNC: 42 KIU/L (ref 0–114)
MAPLE IGE QN: <0.1 KU(A)/L
SILVER BIRCH IGE QN: <0.1 KU(A)/L
TIMOTHY IGE QN: <0.1 KU(A)/L
WHITE OAK IGE QN: <0.1 KU(A)/L

## 2020-09-04 ASSESSMENT — ASTHMA QUESTIONNAIRES: ACT_TOTALSCORE: 17

## 2020-09-23 ENCOUNTER — OFFICE VISIT (OUTPATIENT)
Dept: NURSING | Facility: CLINIC | Age: 65
End: 2020-09-23
Attending: ALLERGY & IMMUNOLOGY
Payer: COMMERCIAL

## 2020-09-23 DIAGNOSIS — J45.50 SEVERE PERSISTENT ASTHMA WITHOUT COMPLICATION (H): ICD-10-CM

## 2020-09-23 PROCEDURE — 94375 RESPIRATORY FLOW VOLUME LOOP: CPT | Performed by: INTERNAL MEDICINE

## 2020-09-23 PROCEDURE — 94726 PLETHYSMOGRAPHY LUNG VOLUMES: CPT | Performed by: INTERNAL MEDICINE

## 2020-09-23 PROCEDURE — 94618 PULMONARY STRESS TESTING: CPT | Performed by: INTERNAL MEDICINE

## 2020-09-23 PROCEDURE — 94729 DIFFUSING CAPACITY: CPT | Performed by: INTERNAL MEDICINE

## 2020-09-28 DIAGNOSIS — S32.028D OTHER CLOSED FRACTURE OF SECOND LUMBAR VERTEBRA WITH ROUTINE HEALING, SUBSEQUENT ENCOUNTER: ICD-10-CM

## 2020-09-28 RX ORDER — OXYCODONE AND ACETAMINOPHEN 5; 325 MG/1; MG/1
TABLET ORAL
Qty: 45 TABLET | Refills: 0 | Status: SHIPPED | OUTPATIENT
Start: 2020-09-28 | End: 2020-10-12

## 2020-09-28 NOTE — TELEPHONE ENCOUNTER
Controlled Substance Refill Request for percocet 5-325 mg  Problem List Complete:  No      Last Written Prescription Date:  8/31/20  Last Fill Quantity: 45,   # refills: 0     Last Office Visit with [facility] primary care provider: 8/3120     Future Office visit:       Controlled substance agreement:   Encounter-Level CSA:    There are no encounter-level csa.      Patient-Level CSA:    There are no patient-level csa.       Last Urine Drug Screen: No results found for: CDAUT, No results found for: COMDAT, No results found for: THC13, PCP13, COC13, MAMP13, OPI13, AMP13, BZO13, TCA13, MTD13, BAR13, OXY13, PPX13, BUP13     [email or web address]      checked in past 3 months?  Yes 7/1/2020     Aisha Izquierdo BSN, RN

## 2020-09-29 ENCOUNTER — TELEPHONE (OUTPATIENT)
Dept: ALLERGY | Facility: OTHER | Age: 65
End: 2020-09-29

## 2020-09-29 DIAGNOSIS — J45.50 SEVERE PERSISTENT ASTHMA WITHOUT COMPLICATION (H): Primary | ICD-10-CM

## 2020-09-29 LAB
DLCOUNC-%PRED-PRE: 67 %
DLCOUNC-PRE: 13.94 ML/MIN/MMHG
DLCOUNC-PRED: 20.51 ML/MIN/MMHG
ERV-%PRED-PRE: 52 %
ERV-PRE: 0.31 L
ERV-PRED: 0.6 L
EXPTIME-PRE: 5.82 SEC
FEF2575-%PRED-PRE: 34 %
FEF2575-PRE: 0.73 L/SEC
FEF2575-PRED: 2.11 L/SEC
FEFMAX-%PRED-PRE: 53 %
FEFMAX-PRE: 3.32 L/SEC
FEFMAX-PRED: 6.15 L/SEC
FEV1-%PRED-PRE: 50 %
FEV1-PRE: 1.23 L
FEV1FEV6-PRE: 64 %
FEV1FEV6-PRED: 80 %
FEV1FVC-PRE: 64 %
FEV1FVC-PRED: 79 %
FEV1SVC-PRE: 52 %
FEV1SVC-PRED: 74 %
FIFMAX-PRE: 1.68 L/SEC
FRCPLETH-%PRED-PRE: 144 %
FRCPLETH-PRE: 3.98 L
FRCPLETH-PRED: 2.76 L
FVC-%PRED-PRE: 61 %
FVC-PRE: 1.91 L
FVC-PRED: 3.12 L
IC-%PRED-PRE: 75 %
IC-PRE: 2.04 L
IC-PRED: 2.7 L
RVPLETH-%PRED-PRE: 181 %
RVPLETH-PRE: 3.67 L
RVPLETH-PRED: 2.03 L
TLCPLETH-%PRED-PRE: 118 %
TLCPLETH-PRE: 6.03 L
TLCPLETH-PRED: 5.11 L
VA-%PRED-PRE: 84 %
VA-PRE: 4.2 L
VC-%PRED-PRE: 71 %
VC-PRE: 2.36 L
VC-PRED: 3.29 L

## 2020-09-29 NOTE — RESULT ENCOUNTER NOTE
No significant decrease in oxygen saturation with physical activity.  Pulmonary function studies show obstruction.  Continue on treatment as discussed at last visit.

## 2020-09-29 NOTE — TELEPHONE ENCOUNTER
Patient states the Spiriva gave her headaches.  She started the medication after her appt on 9/3/20 and after every use she developed a headache.  She stopped the medication after three days and the headaches went away.  She's wondering if she should switch to a different medication.    Gracy Henley RN

## 2020-09-30 NOTE — TELEPHONE ENCOUNTER
Called and notified patient of change of medication. Patient verbalized understanding and had no further questions.     Emily Garcia RN

## 2020-10-12 ENCOUNTER — ANCILLARY PROCEDURE (OUTPATIENT)
Dept: GENERAL RADIOLOGY | Facility: CLINIC | Age: 65
End: 2020-10-12
Attending: FAMILY MEDICINE
Payer: COMMERCIAL

## 2020-10-12 ENCOUNTER — OFFICE VISIT (OUTPATIENT)
Dept: FAMILY MEDICINE | Facility: CLINIC | Age: 65
End: 2020-10-12
Payer: COMMERCIAL

## 2020-10-12 VITALS
DIASTOLIC BLOOD PRESSURE: 90 MMHG | OXYGEN SATURATION: 95 % | HEART RATE: 71 BPM | BODY MASS INDEX: 28.95 KG/M2 | TEMPERATURE: 98.2 F | SYSTOLIC BLOOD PRESSURE: 166 MMHG | WEIGHT: 178 LBS

## 2020-10-12 DIAGNOSIS — S32.028D OTHER CLOSED FRACTURE OF SECOND LUMBAR VERTEBRA WITH ROUTINE HEALING, SUBSEQUENT ENCOUNTER: ICD-10-CM

## 2020-10-12 DIAGNOSIS — R07.89 CHEST WALL PAIN: Primary | ICD-10-CM

## 2020-10-12 DIAGNOSIS — Z11.4 SCREENING FOR HIV (HUMAN IMMUNODEFICIENCY VIRUS): ICD-10-CM

## 2020-10-12 DIAGNOSIS — Z12.11 SCREEN FOR COLON CANCER: ICD-10-CM

## 2020-10-12 DIAGNOSIS — Z13.220 SCREENING FOR HYPERLIPIDEMIA: ICD-10-CM

## 2020-10-12 PROCEDURE — 71101 X-RAY EXAM UNILAT RIBS/CHEST: CPT | Mod: RT | Performed by: RADIOLOGY

## 2020-10-12 PROCEDURE — 99214 OFFICE O/P EST MOD 30 MIN: CPT | Performed by: FAMILY MEDICINE

## 2020-10-12 RX ORDER — OXYCODONE AND ACETAMINOPHEN 5; 325 MG/1; MG/1
TABLET ORAL
Qty: 45 TABLET | Refills: 0 | Status: SHIPPED | OUTPATIENT
Start: 2020-10-12 | End: 2020-11-05

## 2020-10-12 ASSESSMENT — PAIN SCALES - GENERAL: PAINLEVEL: EXTREME PAIN (8)

## 2020-10-12 NOTE — PROGRESS NOTES
SUBJECTIVE:  Ludy Ramos is a 64 year old female who presents to the office with the CC of chest pain.  Patient complains of right rib pain.  She reports that on Thursday she wok up with the pain   It starts up front  and radiates around the chest to the back         The pain is characterized as achy    Pain is associated with none.  Pain is exacerbated by movement. Especially moving her right arm forward across her body.  She has no pain at rest.      She denies shortness of breath, fevers, chills or pain with breathing     She denies any rash except a few spots on her right breast    She denies any burning sensation or any blisters    She is down to one oxycodone a day for her compression fracture.     Past Medical History:   Diagnosis Date     Generalized anxiety disorder      Hypertension goal BP (blood pressure) < 140/90      Insomnia      Intermittent asthma      Menopausal state      Osteopenia          Current Outpatient Medications:      albuterol (PROAIR HFA/PROVENTIL HFA/VENTOLIN HFA) 108 (90 Base) MCG/ACT inhaler, Inhale 2 puffs into the lungs every 4 hours as needed for shortness of breath / dyspnea, Disp: 17 g, Rfl: 3     ALPRAZolam (XANAX) 0.25 MG tablet, Take 1 tablet (0.25 mg) by mouth 3 times daily as needed for anxiety (1 tablet 30-60 minutes before your flight), Disp: 6 tablet, Rfl: 0     BREO ELLIPTA 200-25 MCG/INH Inhaler, Inhale 1 puff by mouth once daily, Disp: 3 Inhaler, Rfl: 0     citalopram (CELEXA) 20 MG tablet, Take 1 tablet by mouth once daily, Disp: 90 tablet, Rfl: 0     losartan-hydrochlorothiazide (HYZAAR) 50-12.5 MG tablet, Take 1 tablet by mouth once daily, Disp: 90 tablet, Rfl: 0     oxyCODONE-acetaminophen (PERCOCET) 5-325 MG tablet, TAKE 1 TABLET BY MOUTH EVERY 6 HOURS AS NEEDED FOR PAIN. DO NOT TAKE WITH IN 6 HOURS OF TAKING ALPRAZOLAM, Disp: 45 tablet, Rfl: 0     traZODone (DESYREL) 100 MG tablet, TAKE 2 TABLETS BY MOUTH AT BEDTIME. APPOINTMENT NEEDED FOR FURTHER REFILLS,  Disp: 180 tablet, Rfl: 1     umeclidinium (INCRUSE ELLIPTA) 62.5 MCG/INH inhaler, Inhale 1 puff into the lungs daily, Disp: 1 each, Rfl: 3     nystatin (MYCOSTATIN) 841110 UNIT/GM external cream, Apply topically 2 times daily (Patient not taking: Reported on 10/12/2020), Disp: 30 g, Rfl: 4  No current facility-administered medications for this visit.     Facility-Administered Medications Ordered in Other Visits:      DOBUTamine 500 mg in dextrose 5% 250 mL (adult std conc) premix, 2.5-20 mcg/kg/min, Intravenous, Continuous, Francis Lai MD, Stopped at 19 1400     metoprolol (LOPRESSOR) injection 5 mg, 5 mg, Intravenous, Q5 Min PRN, Francis Lai MD, 1 mg at 19 1406    Social History     Tobacco Use     Smoking status: Former Smoker     Packs/day: 0.00     Years: 20.00     Pack years: 0.00     Start date: 1980     Quit date: 2001     Years since quittin.8     Smokeless tobacco: Never Used   Substance Use Topics     Alcohol use: Yes     Alcohol/week: 10.0 standard drinks         EXAM:  BP (!) 166/90   Pulse 71   Temp 98.2  F (36.8  C) (Tympanic)   Wt 80.7 kg (178 lb)   SpO2 95%   BMI 28.95 kg/m    GENERAL APPEARANCE: healthy, alert and no distress  EYES: EOMI,  PERRL, conjunctiva clear  HENT: ear canals and TM's normal.  Nose and mouth without ulcers, erythema or lesions  NECK: supple, nontender, no lymphadenopathy  RESP: lungs clear to auscultation - no rales, rhonchi or wheezes  CV: regular rates and rhythm, normal S1 S2, no murmur noted  ABDOMEN:  soft, nontender, no HSM or masses and bowel sounds normal  NEURO: Normal strength and tone, sensory exam grossly normal,  normal speech and mentation  SKIN: no suspicious lesions or rashes except a few macules on the lateral aspect of the right breast        Narrative & Impression     XR RIBS & CHEST RT 3VW 10/12/2020 12:27 PM      HISTORY: Chest wall pain                                                                       IMPRESSION: No apparent right rib displaced fracture. Bilateral linear  atelectasis or fibrosis overlapping the hemidiaphragms. The lungs are  otherwise grossly clear. No apparent pneumothorax or pleural effusion.     AKOSUA CORONA MD           Assessment  / IMPRESSION  Chest wall pan     PLAN:See orders in epic    (R07.89) Chest wall pain  (primary encounter diagnosis)  Comment:   Plan: XR Ribs & Chest Right G/E 3 Views, nabumetone         (RELAFEN) 750 MG tablet                (S32.028D) Other closed fracture of second lumbar vertebra with routine healing, subsequent encounter  Comment:   Plan: oxyCODONE-acetaminophen (PERCOCET) 5-325 MG         tablet

## 2020-10-13 ASSESSMENT — ASTHMA QUESTIONNAIRES: ACT_TOTALSCORE: 17

## 2020-11-04 DIAGNOSIS — S32.028D OTHER CLOSED FRACTURE OF SECOND LUMBAR VERTEBRA WITH ROUTINE HEALING, SUBSEQUENT ENCOUNTER: ICD-10-CM

## 2020-11-05 RX ORDER — OXYCODONE AND ACETAMINOPHEN 5; 325 MG/1; MG/1
TABLET ORAL
Qty: 45 TABLET | Refills: 0 | Status: SHIPPED | OUTPATIENT
Start: 2020-11-05 | End: 2021-01-06

## 2020-11-05 NOTE — TELEPHONE ENCOUNTER
Controlled Substance Refill Request for percocet 5-325 mg  Problem List Complete:  No      Last Written Prescription Date:  10/12/20  Last Fill Quantity: 45,   # refills: 0     Last Office Visit with [facility] primary care provider: 10/12/20     Future Office visit:       Controlled substance agreement:   Encounter-Level CSA:    There are no encounter-level csa.      Patient-Level CSA:    There are no patient-level csa.       Last Urine Drug Screen: No results found for: CDAUT, No results found for: COMDAT, No results found for: THC13, PCP13, COC13, MAMP13, OPI13, AMP13, BZO13, TCA13, MTD13, BAR13, OXY13, PPX13, BUP13     [email or web address]      checked in past 3 months?  unable to check , not working     Aisha LIZARRAGAN, RN

## 2020-11-21 ENCOUNTER — MYC MEDICAL ADVICE (OUTPATIENT)
Dept: FAMILY MEDICINE | Facility: CLINIC | Age: 65
End: 2020-11-21

## 2020-11-21 DIAGNOSIS — J45.40 MODERATE PERSISTENT ASTHMA, UNCOMPLICATED: ICD-10-CM

## 2020-11-23 NOTE — TELEPHONE ENCOUNTER
Routing refill request to provider for review/approval because:  Pt ACT not in range.    ACT Total Scores 6/24/2020 9/3/2020 10/12/2020   ACT TOTAL SCORE - - -   ASTHMA ER VISITS - - -   ASTHMA HOSPITALIZATIONS - - -   ACT TOTAL SCORE (Goal Greater than or Equal to 20) 20 17 17   In the past 12 months, how many times did you visit the emergency room for your asthma without being admitted to the hospital? 0 0 0   In the past 12 months, how many times were you hospitalized overnight because of your asthma? 0 0 0   Carolyn LIZARRAGAN, RN

## 2020-11-27 NOTE — PROGRESS NOTES
Pt calls stating she still has some frequency but no pain with urination.    She does want to get on a different abx that will treat the best.    Irma BOSTON R.N.             SUBJECTIVE:  61 year old The patient has a history of tremors.  This started 2 months ago. Location hands  Associated symptoms are dyspnea on exertion .  Brought on by going up stairs or as the day goes on .  Better with rest . ROS no chest pain,   patient has a history of taking albuterol for shortness of breath. This helps some.  Last spirometry was 6 years ago. She  Smoked 1 pack per day for 30 years and quit 10 years ago.   Reviewed health maintenance  Patient Active Problem List   Diagnosis     Insomnia     CARDIOVASCULAR SCREENING; LDL GOAL LESS THAN 130     Hypertension goal BP (blood pressure) < 140/90     Anxiety     Moderate persistent asthma     History of colonic polyps     Clavicle fracture     Closed displaced fracture of proximal phalanx of left great toe     Chest wall pain     Thoracic back pain     Advanced directives, counseling/discussion     Past Medical History:   Diagnosis Date     Generalized anxiety disorder      Hypertension goal BP (blood pressure) < 140/90      Insomnia      Intermittent asthma      Menopausal state      Osteopenia        OBJECTIVE:  no apparent distress  /80  Pulse 101  Temp 97.4  F (36.3  C) (Oral)  Wt 167 lb 12.8 oz (76.1 kg)  SpO2 96%  BMI 26.28 kg/m2    LUNGS:  CTA B/L, no wheezing or crackles.   Cardiovascular: negative, PMI normal. No lifts, heaves, or thrills. RRR. No murmurs, clicks gallops or rub   Gastrointestinal: Abdomen soft, non-tender. BS normal. No masses, organomegaly   chest x-ray clear of acute process  Spirometry shows severe obstruction    ICD-10-CM    1. Encounter for hepatitis C screening test for low risk patient Z11.59 Hepatitis C Screen Reflex to HCV RNA Quant and Genotype   2. Ex-smoker for more than 1 year Z87.891 CT Chest Lung Cancer Scrn Low Dose wo   3. SOB (shortness of breath) R06.02 Basic metabolic panel  (Ca, Cl, CO2, Creat, Gluc, K, Na, BUN)     XR Chest 2 Views     Spirometry, Breathing Capacity     DISCONTINUED:  mometasone-formoterol (DULERA) 200-5 MCG/ACT oral inhaler   4. Lipid screening Z13.220 Lipid panel reflex to direct LDL   5. Uncontrolled severe persistent asthma J45.50 ALLERGY/ASTHMA ADULT REFERRAL    PLAN: await lab and referral

## 2020-12-06 ENCOUNTER — HEALTH MAINTENANCE LETTER (OUTPATIENT)
Age: 65
End: 2020-12-06

## 2020-12-16 DIAGNOSIS — I10 ESSENTIAL HYPERTENSION: ICD-10-CM

## 2020-12-17 RX ORDER — LOSARTAN POTASSIUM AND HYDROCHLOROTHIAZIDE 12.5; 5 MG/1; MG/1
1 TABLET ORAL DAILY
Qty: 90 TABLET | Refills: 0 | Status: SHIPPED | OUTPATIENT
Start: 2020-12-17 | End: 2021-05-07

## 2020-12-17 NOTE — TELEPHONE ENCOUNTER
Routing refill request to provider for review/approval because:  Labs not current:  Creatinine, potassium, sodium      Aisha Izquierdo BSN, RN

## 2020-12-30 ENCOUNTER — OFFICE VISIT (OUTPATIENT)
Dept: FAMILY MEDICINE | Facility: CLINIC | Age: 65
End: 2020-12-30
Payer: COMMERCIAL

## 2020-12-30 VITALS
OXYGEN SATURATION: 92 % | DIASTOLIC BLOOD PRESSURE: 84 MMHG | HEART RATE: 80 BPM | SYSTOLIC BLOOD PRESSURE: 108 MMHG | TEMPERATURE: 98.8 F | BODY MASS INDEX: 28.62 KG/M2 | WEIGHT: 176 LBS

## 2020-12-30 DIAGNOSIS — R22.42 MASS OF LEFT FOOT: Primary | ICD-10-CM

## 2020-12-30 PROCEDURE — 99213 OFFICE O/P EST LOW 20 MIN: CPT | Performed by: PHYSICIAN ASSISTANT

## 2020-12-30 NOTE — PATIENT INSTRUCTIONS
Patient Education     Ganglion Cyst: Foot  A ganglion is a fluid-filled swelling of the lining of a joint or tendon. Ganglions can form on any part of the foot. But they most often appear on the ankle or top of the foot. Ganglions tend to change in size and often grow slowly.  Causes  Repeated irritation can weaken the lining of a joint or tendon. This can lead to ganglions. Bony outgrowths (bone spurs) and arthritis may also cause ganglions by irritating the joints and tendons.  Symptoms  Ganglions often form with no symptoms. But the ganglion may put pressure on the nerves and the overlying skin. This can cause tingling, numbness, or pain. Ganglions sometimes swell. Their size can change with different activities or a change in weather.  How are they diagnosed?  Ganglions are sometimes mistaken for tumors. So it s important to have a complete exam done. Tests may be done to confirm the diagnosis.  Health history  Your healthcare provider will ask you questions. These include how long you ve had the ganglion and what kind of symptoms you re feeling. He or she may ask if it s changed in size or if its size varies based on your activities.  Physical exam  Your healthcare provider may do a translumination exam. This involves shining a light through the swelling. You can often see through a ganglion, but not through a tumor. When your foot is pressed (palpated), a ganglion feels spongy and the fluid moves from side to side.  Tests  If a bone spur or arthritis is suspected, X-rays may be needed. Fluid removal (needle aspiration) may be done. It also helps to decrease pain. To confirm a ganglion, MRI may be done. This reveals images of soft tissue and bone. Sometimes, special dyes may be injected into the area to show the outline.  How are ganglions treated?  Ganglions may be hard to treat without surgery. But nonsurgical methods may be helpful in relieving some of your symptoms.  Nonsurgical care    Pads placed around  the ganglion can ease pressure and friction.    Fluid removal may also relieve symptoms. This is done with a needle. A steroid may be injected at the same time. But ganglions may come back.    Limiting movements or activities that increase pain may bring relief.    Icing the ganglion for 15 to 20 minutes may relieve inflammation and pain for a short time.    If inflammation is severe, your healthcare provider may treat your symptoms with medicine.  Surgical treatment  Surgery may be needed if a ganglion is causing ongoing or severe pain. The entire ganglion wall is removed during the procedure. Some nearby tissue may also be removed.  After surgery  You may feel pain, swelling, numbness, or tingling for several weeks following surgery. You should be able to walk soon afterward. But your foot may need to be wrapped or in a cast, boot, or hard shoe. See your healthcare provider if you notice any future problems. Surgery is often successful. But there is a chance that the ganglion will come back.  Joan last reviewed this educational content on 5/1/2018 2000-2020 [employer]. [address] [place] [address], [place] [address]. All rights reserved. This information is not intended as a substitute for professional medical care. Always follow your healthcare professional's instructions.

## 2020-12-30 NOTE — PROGRESS NOTES
Subjective     Ludy Ramos is a 65 year old female who presents to clinic today for the following health issues:    HPI           Lump on left foot with pain for 1 week, no injury.   She started walking recently and developed the pain in her foot. There was a lump on the arch of her left foot and so she tried a pair of new tennis shoes. She tried to push through the pain, but the lump persists.         Review of Systems   Constitutional, HEENT, cardiovascular, pulmonary, GI, , musculoskeletal, neuro, skin, endocrine and psych systems are negative, except as otherwise noted.      Objective    /84   Pulse 80   Temp 98.8  F (37.1  C) (Tympanic)   Wt 79.8 kg (176 lb)   SpO2 92%   BMI 28.62 kg/m    Body mass index is 28.62 kg/m .  Physical Exam   GENERAL: healthy, alert and no distress  MS: left foot: normal appearing. There is a palpable mass in the plantar aspect. Tender.   SKIN: no suspicious lesions or rashes  PSYCH: mentation appears normal, affect normal/bright            Assessment & Plan     Mass of left foot  ? Ganglion cyst.   She will plan to see Podiatry to determine how best to treat since this is on the plantar aspect and affecting her activity. Rest. Ice at this time.   - Orthopedic & Spine  Referral; Future     Return in about 1 week (around 1/6/2021) for specialty / Podiatry.    Kristen M. Kehr, PA-C [facility]

## 2020-12-30 NOTE — NURSING NOTE
"Chief Complaint   Patient presents with     Mass     left foot       Initial /84   Pulse 80   Temp 98.8  F (37.1  C) (Tympanic)   Wt 79.8 kg (176 lb)   SpO2 92%   BMI 28.62 kg/m   Estimated body mass index is 28.62 kg/m  as calculated from the following:    Height as of 6/24/20: 1.67 m (5' 5.75\").    Weight as of this encounter: 79.8 kg (176 lb).  Medication Reconciliation: complete    SOUMYA Garcia MA    "

## 2021-01-05 DIAGNOSIS — S32.028D OTHER CLOSED FRACTURE OF SECOND LUMBAR VERTEBRA WITH ROUTINE HEALING, SUBSEQUENT ENCOUNTER: ICD-10-CM

## 2021-01-06 RX ORDER — OXYCODONE AND ACETAMINOPHEN 5; 325 MG/1; MG/1
TABLET ORAL
Qty: 45 TABLET | Refills: 0 | Status: SHIPPED | OUTPATIENT
Start: 2021-01-06 | End: 2021-02-23

## 2021-01-06 NOTE — TELEPHONE ENCOUNTER
Routing refill request to provider for review/approval because:  Drug not on the [facility] refill protocol.  Thank you. Chayo Ruffin R.N.  Requested Prescriptions   Pending Prescriptions Disp Refills    oxyCODONE-acetaminophen (PERCOCET) 5-325 MG tablet [Pharmacy Med Name: oxyCODONE-Acetaminophen Oral Tablet 5-325 MG] 45 tablet 0     Sig: TAKE 1 TABLET BY MOUTH EVERY 6 HOURS AS NEEDED FOR PAIN. DO NOT TAKE WITH IN 6 HOURS OF TAKING ALPRAZOLAM       There is no refill protocol information for this order

## 2021-01-19 ENCOUNTER — OFFICE VISIT (OUTPATIENT)
Dept: PODIATRY | Facility: CLINIC | Age: 66
End: 2021-01-19
Attending: PHYSICIAN ASSISTANT
Payer: COMMERCIAL

## 2021-01-19 VITALS
BODY MASS INDEX: 28.62 KG/M2 | SYSTOLIC BLOOD PRESSURE: 130 MMHG | WEIGHT: 176 LBS | HEART RATE: 74 BPM | DIASTOLIC BLOOD PRESSURE: 78 MMHG

## 2021-01-19 DIAGNOSIS — M72.2 PLANTAR FASCIAL FIBROMATOSIS: Primary | ICD-10-CM

## 2021-01-19 PROCEDURE — 99203 OFFICE O/P NEW LOW 30 MIN: CPT | Performed by: PODIATRIST

## 2021-01-19 NOTE — PATIENT INSTRUCTIONS
We wish you continued good healing. If you have any questions or concerns, please do not hesitate to contact us at [phone]    [employer] (secure e-mail communication and access to your chart) to send a message or to make an appointment.    Please remember to call and schedule a follow up appointment if one was recommended at your earliest convenience.     +++OF MARCH 2020+++ LOCATION AND HOURS HAVE CHANGED    PLEASE CALL CLINICS TO VERIFY DAYS AND TIMES  PODIATRY CLINIC HOURS  TELEPHONE NUMBER    Dr. Kayden IRWINPLEILANI [facility]        Clinics:  [facility]   Tuesday 1PM-6PM  [place]  Wednesday 745AM-330PM  [place]  Thursday/Friday 745AM-230PM  [place] APPOINTMENTS  [phone]    [place] APPOINTMENTS  [phone]          If you need a medication refill, please contact us you may need lab work and/or a follow up visit prior to your refill (i.e. Antifungal medications).    If MRI needed please call Imaging at [phone] or [phone]    HOW DO I GET MY KNEE SCOOTER? Knee scooters can be picked up at ANY Medical Supply stores with your knee scooter Prescription.  OR    Bring your signed prescription to an [facility] Medical [facility] showroom.

## 2021-01-19 NOTE — LETTER
1/19/2021         RE: Ludy Ramos  [address]  [place] [address]        Dear Colleague,    Thank you for referring your patient, Ludy Ramos, to the [facility] [place]. Please see a copy of my visit note below.    S:  Patient seen today in consult from Kristen Kehr and complains of lump on left foot.  Points to the medial band of the plantar fascia.  No trauma.  Has been here for 1 months.   admits any pain.  Denies lumps on her hands.  No history of trauma.  Likes to walk for exercise.         10-point review of systems was performed and is positive for that noted in the HPI and as seen below.  All other areas are negative.        Allergies   Allergen Reactions     Lisinopril      angioedema       Current Outpatient Medications   Medication Sig Dispense Refill     albuterol (PROAIR HFA/PROVENTIL HFA/VENTOLIN HFA) 108 (90 Base) MCG/ACT inhaler Inhale 2 puffs into the lungs every 4 hours as needed for shortness of breath / dyspnea 17 g 3     ALPRAZolam (XANAX) 0.25 MG tablet Take 1 tablet (0.25 mg) by mouth 3 times daily as needed for anxiety (1 tablet 30-60 minutes before your flight) 6 tablet 0     citalopram (CELEXA) 20 MG tablet Take 1 tablet by mouth once daily 90 tablet 0     fluticasone-vilanterol (BREO ELLIPTA) 200-25 MCG/INH inhaler Inhale 1 puff by mouth once daily 3 Inhaler 0     losartan-hydrochlorothiazide (HYZAAR) 50-12.5 MG tablet Take 1 tablet by mouth daily 90 tablet 0     nabumetone (RELAFEN) 750 MG tablet 1 tablet twice a day for 10 days and then twice a day only as needed 60 tablet 1     nystatin (MYCOSTATIN) 656339 UNIT/GM external cream Apply topically 2 times daily 30 g 4     oxyCODONE-acetaminophen (PERCOCET) 5-325 MG tablet TAKE 1 TABLET BY MOUTH EVERY 6 HOURS AS NEEDED FOR PAIN. DO NOT TAKE WITH IN 6 HOURS OF TAKING ALPRAZOLAM 45 tablet 0     traZODone (DESYREL) 100 MG tablet TAKE 2 TABLETS BY MOUTH AT BEDTIME. APPOINTMENT NEEDED FOR FURTHER  REFILLS 180 tablet 1     umeclidinium (INCRUSE ELLIPTA) 62.5 MCG/INH inhaler Inhale 1 puff into the lungs daily 1 each 3       Patient Active Problem List   Diagnosis     Insomnia     CARDIOVASCULAR SCREENING; LDL GOAL LESS THAN 130     Hypertension goal BP (blood pressure) < 140/90     Anxiety     Severe persistent asthma without complication     History of colonic polyps     Clavicle fracture     Closed displaced fracture of proximal phalanx of left great toe     Chest wall pain     Thoracic back pain     Advanced directives, counseling/discussion       Past Medical History:   Diagnosis Date     Generalized anxiety disorder      Hypertension goal BP (blood pressure) < 140/90      Insomnia      Intermittent asthma      Menopausal state      Osteopenia        Past Surgical History:   Procedure Laterality Date     COLONOSCOPY       COLONOSCOPY  5/20/2014    Procedure: COLONOSCOPY;  Surgeon: Jean Hastings MD;  Location: [facility] OR     COLONOSCOPY  5/20/2014    Procedure: COMBINED COLONOSCOPY, SINGLE BIOPSY/POLYPECTOMY BY BIOPSY;  Surgeon: Jean Hastings MD;  Location: [facility] OR     COLONOSCOPY N/A 5/5/2017    Procedure: COMBINED COLONOSCOPY, SINGLE OR MULTIPLE BIOPSY/POLYPECTOMY BY BIOPSY;;  Surgeon: Duane, William Charles, MD;  Location: [facility] OR     COLONOSCOPY WITH CO2 INSUFFLATION N/A 5/5/2017    Procedure: COLONOSCOPY WITH CO2 INSUFFLATION;  colonoscopy, History of colonic polyps  BMI 25.74  [email or web address], fax: [phone];  Surgeon: Duane, William Charles, MD;  Location: [facility] OR     COSMETIC SURGERY       FINGER SURGERY      Left 4th digit     HYSTERECTOMY, JONAS       SURGICAL HISTORY OF -       Right 2nd digit     TONSILLECTOMY         Family History   Problem Relation Age of Onset     Respiratory Mother         copd     Respiratory Father         empysema     Melanoma No family hx of        Social History     Tobacco Use     Smoking status: Former Smoker     Packs/day: 0.00     Years: 20.00      Pack years: 0.00     Start date: 1980     Quit date: 2001     Years since quittin.1     Smokeless tobacco: Never Used   Substance Use Topics     Alcohol use: Yes     Alcohol/week: 10.0 standard drinks         Exam:    Vitals: /78   Pulse 74   Wt 79.8 kg (176 lb)   BMI 28.62 kg/m    BMI: Body mass index is 28.62 kg/m .  Height: Data Unavailable    Constitutional/ general:  Pt is in no apparent distress, appears well-nourished.  Cooperative with history and physical exam.     Psych:  The patient answered questions appropriately.  Normal affect.  Seems to have reasonable expectations, in terms of treatment.     Eyes:  Visual scanning/ tracking without deficit.     Ears:  Response to auditory stimuli is normal.  No  hearing aid devices.  Auricles in proper alignment.     Lymphatic:  Popliteal lymph nodes not enlarged.     Lungs:  Non labored breathing, non labored speech. No cough.  No audible wheezing. Even, quiet breathing.       Vascular:  Pedal pulses are palpable bilaterally for both the DP and PT arteries.  CFT < 3 sec.  No edema.  Pedal hair growth noted.     Neuro:  Alert and oriented x 3. Coordinated gait.  Light touch sensation is intact to the L4, L5, S1 distributions. No obvious deficits.  No evidence of neurological-based weakness, spasticity, or contracture in the lower extremities.     Derm: Normal texture and turgor.  No erythema, ecchymosis, or cyanosis.  No open lesions.     Musculoskeletal:     Normal arch with weightbearing.  No forefoot or rear foot deformities noted.  MS 5/5 all compartments.  Normal ROM all fore foot and rearfoot joints.  No equinus.  No pain with stressing muscle compartments.  left foot mass firmly attached to the medial band of the plantar fascia.  No pain or numbness with palpation.  Nonpulsitile.  No masses on the contralateral foot.    X-rays:  unremarkable    A:  left Plantar Fibromatosis    P:  Personally reviewed past x-rays.  Discussed cause of  this with patient.  Will watch for now and protect with shoes.  She will cut divot in arch supports to offload this.  Discussed topical analgesics.    Discussed orthotics and injections.  Return to clinic if gets larger or starts to hurt.  Thank you for allowing me participate in the care of this patient.        Kayden Bravo DPM DPM, FACFAS        Again, thank you for allowing me to participate in the care of your patient.        Sincerely,        Kayden Bravo DPM

## 2021-01-19 NOTE — PROGRESS NOTES
S:  Patient seen today in consult from Kristen Kehr and complains of lump on left foot.  Points to the medial band of the plantar fascia.  No trauma.  Has been here for 1 months.   admits any pain.  Denies lumps on her hands.  No history of trauma.  Likes to walk for exercise.         10-point review of systems was performed and is positive for that noted in the HPI and as seen below.  All other areas are negative.        Allergies   Allergen Reactions     Lisinopril      angioedema       Current Outpatient Medications   Medication Sig Dispense Refill     albuterol (PROAIR HFA/PROVENTIL HFA/VENTOLIN HFA) 108 (90 Base) MCG/ACT inhaler Inhale 2 puffs into the lungs every 4 hours as needed for shortness of breath / dyspnea 17 g 3     ALPRAZolam (XANAX) 0.25 MG tablet Take 1 tablet (0.25 mg) by mouth 3 times daily as needed for anxiety (1 tablet 30-60 minutes before your flight) 6 tablet 0     citalopram (CELEXA) 20 MG tablet Take 1 tablet by mouth once daily 90 tablet 0     fluticasone-vilanterol (BREO ELLIPTA) 200-25 MCG/INH inhaler Inhale 1 puff by mouth once daily 3 Inhaler 0     losartan-hydrochlorothiazide (HYZAAR) 50-12.5 MG tablet Take 1 tablet by mouth daily 90 tablet 0     nabumetone (RELAFEN) 750 MG tablet 1 tablet twice a day for 10 days and then twice a day only as needed 60 tablet 1     nystatin (MYCOSTATIN) 380311 UNIT/GM external cream Apply topically 2 times daily 30 g 4     oxyCODONE-acetaminophen (PERCOCET) 5-325 MG tablet TAKE 1 TABLET BY MOUTH EVERY 6 HOURS AS NEEDED FOR PAIN. DO NOT TAKE WITH IN 6 HOURS OF TAKING ALPRAZOLAM 45 tablet 0     traZODone (DESYREL) 100 MG tablet TAKE 2 TABLETS BY MOUTH AT BEDTIME. APPOINTMENT NEEDED FOR FURTHER REFILLS 180 tablet 1     umeclidinium (INCRUSE ELLIPTA) 62.5 MCG/INH inhaler Inhale 1 puff into the lungs daily 1 each 3       Patient Active Problem List   Diagnosis     Insomnia     CARDIOVASCULAR SCREENING; LDL GOAL LESS THAN 130     Hypertension goal BP (blood  pressure) < 140/90     Anxiety     Severe persistent asthma without complication     History of colonic polyps     Clavicle fracture     Closed displaced fracture of proximal phalanx of left great toe     Chest wall pain     Thoracic back pain     Advanced directives, counseling/discussion       Past Medical History:   Diagnosis Date     Generalized anxiety disorder      Hypertension goal BP (blood pressure) < 140/90      Insomnia      Intermittent asthma      Menopausal state      Osteopenia        Past Surgical History:   Procedure Laterality Date     COLONOSCOPY       COLONOSCOPY  2014    Procedure: COLONOSCOPY;  Surgeon: Jean Hastings MD;  Location: [facility]     COLONOSCOPY  2014    Procedure: COMBINED COLONOSCOPY, SINGLE BIOPSY/POLYPECTOMY BY BIOPSY;  Surgeon: Jean Hastings MD;  Location: [facility]     COLONOSCOPY N/A 2017    Procedure: COMBINED COLONOSCOPY, SINGLE OR MULTIPLE BIOPSY/POLYPECTOMY BY BIOPSY;;  Surgeon: Duane, William Charles, MD;  Location: [facility]     COLONOSCOPY WITH CO2 INSUFFLATION N/A 2017    Procedure: COLONOSCOPY WITH CO2 INSUFFLATION;  colonoscopy, History of colonic polyps  BMI 25.74  [facility], fax: [phone];  Surgeon: Duane, William Charles, MD;  Location: [facility]     COSMETIC SURGERY       FINGER SURGERY      Left 4th digit     HYSTERECTOMY, JONAS       SURGICAL HISTORY OF -       Right 2nd digit     TONSILLECTOMY         Family History   Problem Relation Age of Onset     Respiratory Mother         copd     Respiratory Father         empysema     Melanoma No family hx of        Social History     Tobacco Use     Smoking status: Former Smoker     Packs/day: 0.00     Years: 20.00     Pack years: 0.00     Start date: 1980     Quit date: 2001     Years since quittin.1     Smokeless tobacco: Never Used   Substance Use Topics     Alcohol use: Yes     Alcohol/week: 10.0 standard drinks         Exam:    Vitals: /78   Pulse 74    Wt 79.8 kg (176 lb)   BMI 28.62 kg/m    BMI: Body mass index is 28.62 kg/m .  Height: Data Unavailable    Constitutional/ general:  Pt is in no apparent distress, appears well-nourished.  Cooperative with history and physical exam.     Psych:  The patient answered questions appropriately.  Normal affect.  Seems to have reasonable expectations, in terms of treatment.     Eyes:  Visual scanning/ tracking without deficit.     Ears:  Response to auditory stimuli is normal.  No  hearing aid devices.  Auricles in proper alignment.     Lymphatic:  Popliteal lymph nodes not enlarged.     Lungs:  Non labored breathing, non labored speech. No cough.  No audible wheezing. Even, quiet breathing.       Vascular:  Pedal pulses are palpable bilaterally for both the DP and PT arteries.  CFT < 3 sec.  No edema.  Pedal hair growth noted.     Neuro:  Alert and oriented x 3. Coordinated gait.  Light touch sensation is intact to the L4, L5, S1 distributions. No obvious deficits.  No evidence of neurological-based weakness, spasticity, or contracture in the lower extremities.     Derm: Normal texture and turgor.  No erythema, ecchymosis, or cyanosis.  No open lesions.     Musculoskeletal:     Normal arch with weightbearing.  No forefoot or rear foot deformities noted.  MS 5/5 all compartments.  Normal ROM all fore foot and rearfoot joints.  No equinus.  No pain with stressing muscle compartments.  left foot mass firmly attached to the medial band of the plantar fascia.  No pain or numbness with palpation.  Nonpulsitile.  No masses on the contralateral foot.    X-rays:  unremarkable    A:  left Plantar Fibromatosis    P:  Personally reviewed past x-rays.  Discussed cause of this with patient.  Will watch for now and protect with shoes.  She will cut divot in arch supports to offload this.  Discussed topical analgesics.    Discussed orthotics and injections.  Return to clinic if gets larger or starts to hurt.  Thank you for allowing me  participate in the care of this patient.        Kayden Bravo, AMRJORIE RAINEYM, FACFAS

## 2021-02-10 DIAGNOSIS — G47.00 PERSISTENT INSOMNIA: ICD-10-CM

## 2021-02-11 RX ORDER — TRAZODONE HYDROCHLORIDE 100 MG/1
TABLET ORAL
Qty: 180 TABLET | Refills: 0 | Status: SHIPPED | OUTPATIENT
Start: 2021-02-11 | End: 2021-05-07

## 2021-02-11 NOTE — TELEPHONE ENCOUNTER
Routing refill request to provider for review/approval because:  When trying to sign the Rx the warning below appeared.  Please sign Prescription(s) if appropriate.  Thank you. Chayo Ruffin R.N.

## 2021-02-22 DIAGNOSIS — S32.028D OTHER CLOSED FRACTURE OF SECOND LUMBAR VERTEBRA WITH ROUTINE HEALING, SUBSEQUENT ENCOUNTER: ICD-10-CM

## 2021-02-23 RX ORDER — OXYCODONE AND ACETAMINOPHEN 5; 325 MG/1; MG/1
TABLET ORAL
Qty: 45 TABLET | Refills: 0 | Status: SHIPPED | OUTPATIENT
Start: 2021-02-23 | End: 2021-04-02

## 2021-02-23 NOTE — TELEPHONE ENCOUNTER
Routing refill request to provider for review/approval because:  Drug not on the [facility] refill protocol     Aisha LIZARRAGAN, RN

## 2021-02-25 ENCOUNTER — OFFICE VISIT (OUTPATIENT)
Dept: FAMILY MEDICINE | Facility: CLINIC | Age: 66
End: 2021-02-25
Payer: COMMERCIAL

## 2021-02-25 ENCOUNTER — ANCILLARY PROCEDURE (OUTPATIENT)
Dept: GENERAL RADIOLOGY | Facility: CLINIC | Age: 66
End: 2021-02-25
Attending: FAMILY MEDICINE
Payer: COMMERCIAL

## 2021-02-25 VITALS
HEART RATE: 92 BPM | BODY MASS INDEX: 27.8 KG/M2 | OXYGEN SATURATION: 94 % | HEIGHT: 66 IN | SYSTOLIC BLOOD PRESSURE: 144 MMHG | WEIGHT: 173 LBS | DIASTOLIC BLOOD PRESSURE: 86 MMHG | TEMPERATURE: 98.7 F

## 2021-02-25 DIAGNOSIS — Z78.0 ASYMPTOMATIC MENOPAUSAL STATE: ICD-10-CM

## 2021-02-25 DIAGNOSIS — M85.80 OSTEOPENIA, UNSPECIFIED LOCATION: ICD-10-CM

## 2021-02-25 DIAGNOSIS — Z12.31 ENCOUNTER FOR SCREENING MAMMOGRAM FOR BREAST CANCER: ICD-10-CM

## 2021-02-25 DIAGNOSIS — Z12.11 SCREEN FOR COLON CANCER: Primary | ICD-10-CM

## 2021-02-25 DIAGNOSIS — R07.9 CHEST PAIN, UNSPECIFIED TYPE: ICD-10-CM

## 2021-02-25 DIAGNOSIS — Z13.220 SCREENING FOR HYPERLIPIDEMIA: ICD-10-CM

## 2021-02-25 LAB
D DIMER PPP FEU-MCNC: 0.4 UG/ML FEU (ref 0–0.5)
ERYTHROCYTE [DISTWIDTH] IN BLOOD BY AUTOMATED COUNT: 11.6 % (ref 10–15)
HCT VFR BLD AUTO: 42.9 % (ref 35–47)
HGB BLD-MCNC: 14.6 G/DL (ref 11.7–15.7)
MCH RBC QN AUTO: 34.6 PG (ref 26.5–33)
MCHC RBC AUTO-ENTMCNC: 34 G/DL (ref 31.5–36.5)
MCV RBC AUTO: 102 FL (ref 78–100)
PLATELET # BLD AUTO: 210 10E9/L (ref 150–450)
RBC # BLD AUTO: 4.22 10E12/L (ref 3.8–5.2)
WBC # BLD AUTO: 7.1 10E9/L (ref 4–11)

## 2021-02-25 PROCEDURE — 99214 OFFICE O/P EST MOD 30 MIN: CPT | Performed by: FAMILY MEDICINE

## 2021-02-25 PROCEDURE — 93000 ELECTROCARDIOGRAM COMPLETE: CPT | Performed by: FAMILY MEDICINE

## 2021-02-25 PROCEDURE — 85379 FIBRIN DEGRADATION QUANT: CPT | Performed by: FAMILY MEDICINE

## 2021-02-25 PROCEDURE — 71101 X-RAY EXAM UNILAT RIBS/CHEST: CPT | Mod: RT | Performed by: RADIOLOGY

## 2021-02-25 PROCEDURE — 85027 COMPLETE CBC AUTOMATED: CPT | Performed by: FAMILY MEDICINE

## 2021-02-25 PROCEDURE — 36415 COLL VENOUS BLD VENIPUNCTURE: CPT | Performed by: FAMILY MEDICINE

## 2021-02-25 ASSESSMENT — MIFFLIN-ST. JEOR: SCORE: 1341.47

## 2021-02-25 NOTE — PATIENT INSTRUCTIONS
Please call our [facility] Scheduling Line at [phone] to schedule your:  Dexa Scan (bone density scan)  Mammogram

## 2021-02-25 NOTE — PROGRESS NOTES
SUBJECTIVE:  Ludy Ramos is a 65 year old female who presents to the office with the CC of chest pain.  Patient complains of chest pressure/discomfort.  The pain is characterized as moderate  located right anterior chest  with radiation to none. Symptoms began 3 week(s) ago.   It lasted during the whole day     Now the pain usually starts when she starts walking   When she stops walking  it gets better     Later in the evening when relaxing it totally resolves    She reports that when she washed her hair and raises her arm the pain starts.      Pain is associated with very mild SOB.  She denies nausea emesis, radiation of the pain, diaphoresis      Cardiac risk factors: negative for diabetes mellitus, family history, post menopausal smoking   Positive for hypertension   She has taken Alleve but does not help very  much     Past Medical History:   Diagnosis Date     Generalized anxiety disorder      Hypertension goal BP (blood pressure) < 140/90      Insomnia      Intermittent asthma      Menopausal state      Osteopenia          Current Outpatient Medications:      albuterol (PROAIR HFA/PROVENTIL HFA/VENTOLIN HFA) 108 (90 Base) MCG/ACT inhaler, Inhale 2 puffs into the lungs every 4 hours as needed for shortness of breath / dyspnea, Disp: 17 g, Rfl: 3     ALPRAZolam (XANAX) 0.25 MG tablet, Take 1 tablet (0.25 mg) by mouth 3 times daily as needed for anxiety (1 tablet 30-60 minutes before your flight), Disp: 6 tablet, Rfl: 0     citalopram (CELEXA) 20 MG tablet, Take 1 tablet by mouth once daily, Disp: 90 tablet, Rfl: 0     fluticasone-vilanterol (BREO ELLIPTA) 200-25 MCG/INH inhaler, Inhale 1 puff by mouth once daily, Disp: 3 Inhaler, Rfl: 0     losartan-hydrochlorothiazide (HYZAAR) 50-12.5 MG tablet, Take 1 tablet by mouth daily, Disp: 90 tablet, Rfl: 0     oxyCODONE-acetaminophen (PERCOCET) 5-325 MG tablet, TAKE 1 TABLET BY MOUTH EVERY 6 HOURS AS NEEDED FOR PAIN. DO NOT TAKE WITH IN 6 HOURS OF TAKING  "ALPRAZOLAM, Disp: 45 tablet, Rfl: 0     traZODone (DESYREL) 100 MG tablet, TAKE 2 TABLETS BY MOUTH AT BEDTIME, Disp: 180 tablet, Rfl: 0     umeclidinium (INCRUSE ELLIPTA) 62.5 MCG/INH inhaler, Inhale 1 puff into the lungs daily, Disp: 1 each, Rfl: 3     nabumetone (RELAFEN) 750 MG tablet, 1 tablet twice a day for 10 days and then twice a day only as needed (Patient not taking: Reported on 2021), Disp: 60 tablet, Rfl: 1     nystatin (MYCOSTATIN) 889082 UNIT/GM external cream, Apply topically 2 times daily (Patient not taking: Reported on 2021), Disp: 30 g, Rfl: 4  No current facility-administered medications for this visit.     Facility-Administered Medications Ordered in Other Visits:      DOBUTamine 500 mg in dextrose 5% 250 mL (adult std conc) premix, 2.5-20 mcg/kg/min, Intravenous, Continuous, Francis Lai MD, Stopped at 19 1400     metoprolol (LOPRESSOR) injection 5 mg, 5 mg, Intravenous, Q5 Min PRN, Francis Lai MD, 1 mg at 19 1406    Social History     Tobacco Use     Smoking status: Former Smoker     Packs/day: 0.00     Years: 20.00     Pack years: 0.00     Start date: 1980     Quit date: 2001     Years since quittin.2     Smokeless tobacco: Never Used   Substance Use Topics     Alcohol use: Yes     Alcohol/week: 10.0 standard drinks         EXAM:  BP (!) 144/86   Pulse 92   Temp 98.7  F (37.1  C) (Tympanic)   Ht 1.676 m (5' 6\")   Wt 78.5 kg (173 lb)   SpO2 94%   BMI 27.92 kg/m     Wt Readings from Last 4 Encounters:   21 78.5 kg (173 lb)   21 79.8 kg (176 lb)   20 79.8 kg (176 lb)   10/12/20 80.7 kg (178 lb)       GENERAL APPEARANCE: healthy, alert and no distress  EYES: EOMI,  PERRL, conjunctiva clear  HENT: ear canals and TM's normal.  Nose and mouth without ulcers, erythema or lesions  NECK: supple, nontender, no lymphadenopathy  RESP: lungs clear to auscultation - no rales, rhonchi or wheezes, right chest wall moderately " tender  CV: regular rates and rhythm, normal S1 S2, no murmur noted  ABDOMEN:  soft, nontender, no HSM or masses and bowel sounds normal  Extremities: no peripheral edema or tenderness, peripheral pulses normal, Yasir's sign is negative  NEURO: Normal strength and tone, sensory exam grossly normal,  normal speech and mentation  SKIN: no suspicious lesions or rashes     Office EKG demonstrates:  Normal Sinus Rhythm, normal axis, normal intervals, no acute ST/T changes c/w ischemia, appears normal, NSR,normal axis, normal intervals, no acute ST/T changes c/w ischemia,no LVH by voltage criteria,unchanged from previous tracings    Results for orders placed or performed in visit on 02/25/21   XR Ribs & Chest Right G/E 3 Views     Status: None    Narrative    XR RIBS & CHEST RT 3VW 2/25/2021 5:45 PM     HISTORY: Chest pain, unspecified type    COMPARISON: 10/12/2020      Impression    IMPRESSION: No apparent right rib displaced fracture. Possible  emphysematous changes at the lung apices. The lungs appear clear. No  apparent pneumothorax or pleural effusion.    AKOSUA CORONA MD   CBC with platelets     Status: Abnormal   Result Value Ref Range    WBC 7.1 4.0 - 11.0 10e9/L    RBC Count 4.22 3.8 - 5.2 10e12/L    Hemoglobin 14.6 11.7 - 15.7 g/dL    Hematocrit 42.9 35.0 - 47.0 %     (H) 78 - 100 fl    MCH 34.6 (H) 26.5 - 33.0 pg    MCHC 34.0 31.5 - 36.5 g/dL    RDW 11.6 10.0 - 15.0 %    Platelet Count 210 150 - 450 10e9/L   D dimer, quantitative     Status: None   Result Value Ref Range    D Dimer 0.4 0.0 - 0.50 ug/ml FEU         Assessment  / IMPRESSION  Chest wall pain versus stable Angina    PLAN: we will have the patient undergo a nuclear stress test to evaluation for flow limiting CAD     Plan: EKG 12-lead complete w/read - Clinics  NM Lexiscan         stress test

## 2021-02-26 ASSESSMENT — ASTHMA QUESTIONNAIRES: ACT_TOTALSCORE: 20

## 2021-02-26 NOTE — RESULT ENCOUNTER NOTE
Ludy,  I have reviewed the results of the laboratory tests that we recently ordered. All of the lab work performed was normal or considered normal for you. I ordered the heart test. Please call [phone] to schedule it.    Sincerely,   Ace Steinberg MD

## 2021-03-04 ENCOUNTER — HOSPITAL ENCOUNTER (OUTPATIENT)
Dept: NUCLEAR MEDICINE | Facility: CLINIC | Age: 66
Setting detail: NUCLEAR MEDICINE
End: 2021-03-04
Attending: FAMILY MEDICINE
Payer: COMMERCIAL

## 2021-03-04 ENCOUNTER — HOSPITAL ENCOUNTER (OUTPATIENT)
Dept: CARDIOLOGY | Facility: CLINIC | Age: 66
End: 2021-03-04
Attending: FAMILY MEDICINE
Payer: COMMERCIAL

## 2021-03-04 DIAGNOSIS — R07.9 CHEST PAIN, UNSPECIFIED TYPE: ICD-10-CM

## 2021-03-04 LAB
CV STRESS MAX HR HE: 111
RADIOLOGIST FLAGS: NORMAL
RATE PRESSURE PRODUCT: NORMAL
STRESS ECHO BASELINE DIASTOLIC HE: 74
STRESS ECHO BASELINE HR: 79
STRESS ECHO BASELINE SYSTOLIC BP: 116
STRESS ECHO CALCULATED PERCENT HR: 72 %
STRESS ECHO LAST STRESS DIASTOLIC BP: 77
STRESS ECHO LAST STRESS SYSTOLIC BP: 120
STRESS ECHO TARGET HR: 154

## 2021-03-04 PROCEDURE — A9502 TC99M TETROFOSMIN: HCPCS | Performed by: FAMILY MEDICINE

## 2021-03-04 PROCEDURE — 93018 CV STRESS TEST I&R ONLY: CPT | Performed by: INTERNAL MEDICINE

## 2021-03-04 PROCEDURE — 250N000011 HC RX IP 250 OP 636: Performed by: INTERNAL MEDICINE

## 2021-03-04 PROCEDURE — 78452 HT MUSCLE IMAGE SPECT MULT: CPT

## 2021-03-04 PROCEDURE — 93016 CV STRESS TEST SUPVJ ONLY: CPT | Performed by: INTERNAL MEDICINE

## 2021-03-04 PROCEDURE — 93017 CV STRESS TEST TRACING ONLY: CPT

## 2021-03-04 PROCEDURE — 343N000001 HC RX 343: Performed by: FAMILY MEDICINE

## 2021-03-04 PROCEDURE — 78452 HT MUSCLE IMAGE SPECT MULT: CPT | Mod: 26

## 2021-03-04 RX ORDER — ALBUTEROL SULFATE 90 UG/1
2 AEROSOL, METERED RESPIRATORY (INHALATION) EVERY 5 MIN PRN
Status: DISCONTINUED | OUTPATIENT
Start: 2021-03-04 | End: 2021-03-05 | Stop reason: HOSPADM

## 2021-03-04 RX ORDER — CAFFEINE CITRATE 20 MG/ML
60 SOLUTION INTRAVENOUS
Status: DISCONTINUED | OUTPATIENT
Start: 2021-03-04 | End: 2021-03-05 | Stop reason: HOSPADM

## 2021-03-04 RX ORDER — REGADENOSON 0.08 MG/ML
0.4 INJECTION, SOLUTION INTRAVENOUS ONCE
Status: COMPLETED | OUTPATIENT
Start: 2021-03-04 | End: 2021-03-04

## 2021-03-04 RX ORDER — ACYCLOVIR 200 MG/1
0-1 CAPSULE ORAL
Status: DISCONTINUED | OUTPATIENT
Start: 2021-03-04 | End: 2021-03-05 | Stop reason: HOSPADM

## 2021-03-04 RX ORDER — AMINOPHYLLINE 25 MG/ML
50-100 INJECTION, SOLUTION INTRAVENOUS
Status: DISCONTINUED | OUTPATIENT
Start: 2021-03-04 | End: 2021-03-05 | Stop reason: HOSPADM

## 2021-03-04 RX ADMIN — REGADENOSON 0.4 MG: 0.08 INJECTION, SOLUTION INTRAVENOUS at 11:07

## 2021-03-04 RX ADMIN — TETROFOSMIN 10.8 MCI.: 1.38 INJECTION, POWDER, LYOPHILIZED, FOR SOLUTION INTRAVENOUS at 09:51

## 2021-03-04 RX ADMIN — TETROFOSMIN 41.1 MCI.: 1.38 INJECTION, POWDER, LYOPHILIZED, FOR SOLUTION INTRAVENOUS at 12:05

## 2021-03-04 NOTE — PROGRESS NOTES
Pt here for Lexiscan nuclear stress test.  Medication and side effects reviewed with patient. Lung sounds clear to auscultation bilaterally.  Denied caffeine use.  Patient tolerated Lexiscan dose without any adverse reactions.  VSS.  Monitored post injection and then taken to the [facility] waiting room and instructed to wait there for nuclear medicine tech for follow up imaging.

## 2021-03-05 PROBLEM — R91.8 PULMONARY NODULES: Status: ACTIVE | Noted: 2021-03-05

## 2021-03-06 NOTE — RESULT ENCOUNTER NOTE
Ludy,  I have reviewed the report of the imaging test or tests that we recently ordered. The results were normal or considered normal for you. Your heart test showed no blockages of the heart arteries. I think the chest pain you had is coming from the ribs and muscles. You should take ibuprofen or the nabumetone for the pain .  The scan did show an incidental finding of a lung nodule and the radiologist recommend(ed) a CT scan be done in 6-12 month(s) to check on that.   Sincerely,   Ace Steinberg MD

## 2021-03-10 ENCOUNTER — IMMUNIZATION (OUTPATIENT)
Dept: NURSING | Facility: CLINIC | Age: 66
End: 2021-03-10
Payer: COMMERCIAL

## 2021-03-10 PROCEDURE — 91303 PR COVID VAC JANSSEN AD26 0.5ML: CPT

## 2021-03-10 PROCEDURE — 0031A PR COVID VAC JANSSEN AD26 0.5ML: CPT

## 2021-03-16 DIAGNOSIS — F41.1 GAD (GENERALIZED ANXIETY DISORDER): ICD-10-CM

## 2021-03-18 DIAGNOSIS — F41.1 GAD (GENERALIZED ANXIETY DISORDER): ICD-10-CM

## 2021-03-18 RX ORDER — CITALOPRAM HYDROBROMIDE 20 MG/1
20 TABLET ORAL DAILY
Qty: 90 TABLET | Refills: 0 | Status: SHIPPED | OUTPATIENT
Start: 2021-03-18 | End: 2021-05-07

## 2021-03-18 NOTE — TELEPHONE ENCOUNTER
Routing refill request to provider for review/approval because:  Drug interaction warning    Aisha Izquierdo BSN, RN

## 2021-03-22 RX ORDER — CITALOPRAM HYDROBROMIDE 20 MG/1
TABLET ORAL
Qty: 90 TABLET | Refills: 0 | OUTPATIENT
Start: 2021-03-22

## 2021-03-22 NOTE — TELEPHONE ENCOUNTER
Spoke with Jane at [facility]. Patient did  the citalopram 20mg prescription on 3/21/21 #90.  Sent denial to [facility]; patient filled elsewhere.

## 2021-04-01 DIAGNOSIS — S32.028D OTHER CLOSED FRACTURE OF SECOND LUMBAR VERTEBRA WITH ROUTINE HEALING, SUBSEQUENT ENCOUNTER: ICD-10-CM

## 2021-04-02 RX ORDER — OXYCODONE AND ACETAMINOPHEN 5; 325 MG/1; MG/1
TABLET ORAL
Qty: 45 TABLET | Refills: 0 | Status: SHIPPED | OUTPATIENT
Start: 2021-04-02 | End: 2021-04-27

## 2021-04-02 NOTE — TELEPHONE ENCOUNTER
Routing refill request to provider for review/approval because:  Drug not on the [facility] refill protocol     Tegan LIZARRAGAN, RN

## 2021-04-11 ENCOUNTER — HEALTH MAINTENANCE LETTER (OUTPATIENT)
Age: 66
End: 2021-04-11

## 2021-04-26 ENCOUNTER — ANCILLARY PROCEDURE (OUTPATIENT)
Dept: BONE DENSITY | Facility: CLINIC | Age: 66
End: 2021-04-26
Attending: FAMILY MEDICINE
Payer: COMMERCIAL

## 2021-04-26 DIAGNOSIS — Z78.0 ASYMPTOMATIC MENOPAUSAL STATE: ICD-10-CM

## 2021-04-26 DIAGNOSIS — M85.80 OSTEOPENIA, UNSPECIFIED LOCATION: ICD-10-CM

## 2021-04-26 PROCEDURE — 77085 DXA BONE DENSITY AXL VRT FX: CPT | Performed by: INTERNAL MEDICINE

## 2021-04-27 DIAGNOSIS — S32.028D OTHER CLOSED FRACTURE OF SECOND LUMBAR VERTEBRA WITH ROUTINE HEALING, SUBSEQUENT ENCOUNTER: ICD-10-CM

## 2021-04-27 RX ORDER — OXYCODONE AND ACETAMINOPHEN 5; 325 MG/1; MG/1
TABLET ORAL
Qty: 45 TABLET | Refills: 0 | Status: SHIPPED | OUTPATIENT
Start: 2021-04-27 | End: 2021-05-07

## 2021-05-07 ENCOUNTER — OFFICE VISIT (OUTPATIENT)
Dept: FAMILY MEDICINE | Facility: CLINIC | Age: 66
End: 2021-05-07
Payer: COMMERCIAL

## 2021-05-07 ENCOUNTER — ANCILLARY PROCEDURE (OUTPATIENT)
Dept: GENERAL RADIOLOGY | Facility: CLINIC | Age: 66
End: 2021-05-07
Attending: FAMILY MEDICINE
Payer: COMMERCIAL

## 2021-05-07 VITALS
WEIGHT: 174 LBS | OXYGEN SATURATION: 95 % | SYSTOLIC BLOOD PRESSURE: 164 MMHG | BODY MASS INDEX: 28.08 KG/M2 | DIASTOLIC BLOOD PRESSURE: 86 MMHG | HEART RATE: 101 BPM

## 2021-05-07 DIAGNOSIS — M25.551 BILATERAL HIP PAIN: ICD-10-CM

## 2021-05-07 DIAGNOSIS — G47.00 PERSISTENT INSOMNIA: ICD-10-CM

## 2021-05-07 DIAGNOSIS — M25.552 BILATERAL HIP PAIN: Primary | ICD-10-CM

## 2021-05-07 DIAGNOSIS — I10 ESSENTIAL HYPERTENSION: ICD-10-CM

## 2021-05-07 DIAGNOSIS — M25.552 BILATERAL HIP PAIN: ICD-10-CM

## 2021-05-07 DIAGNOSIS — Z13.220 SCREENING FOR HYPERLIPIDEMIA: ICD-10-CM

## 2021-05-07 DIAGNOSIS — S32.028D OTHER CLOSED FRACTURE OF SECOND LUMBAR VERTEBRA WITH ROUTINE HEALING, SUBSEQUENT ENCOUNTER: ICD-10-CM

## 2021-05-07 DIAGNOSIS — Z12.11 SCREEN FOR COLON CANCER: ICD-10-CM

## 2021-05-07 DIAGNOSIS — M25.551 BILATERAL HIP PAIN: Primary | ICD-10-CM

## 2021-05-07 DIAGNOSIS — F41.1 GAD (GENERALIZED ANXIETY DISORDER): ICD-10-CM

## 2021-05-07 DIAGNOSIS — M80.00XS AGE-RELATED OSTEOPOROSIS WITH CURRENT PATHOLOGICAL FRACTURE, SEQUELA: ICD-10-CM

## 2021-05-07 PROCEDURE — 73523 X-RAY EXAM HIPS BI 5/> VIEWS: CPT | Performed by: RADIOLOGY

## 2021-05-07 PROCEDURE — 99214 OFFICE O/P EST MOD 30 MIN: CPT | Performed by: FAMILY MEDICINE

## 2021-05-07 RX ORDER — CITALOPRAM HYDROBROMIDE 20 MG/1
20 TABLET ORAL DAILY
Qty: 90 TABLET | Refills: 1 | Status: SHIPPED | OUTPATIENT
Start: 2021-05-07 | End: 2021-12-03

## 2021-05-07 RX ORDER — OXYCODONE AND ACETAMINOPHEN 5; 325 MG/1; MG/1
TABLET ORAL
Qty: 45 TABLET | Refills: 0 | Status: SHIPPED | OUTPATIENT
Start: 2021-05-07 | End: 2021-06-28

## 2021-05-07 RX ORDER — LOSARTAN POTASSIUM AND HYDROCHLOROTHIAZIDE 12.5; 5 MG/1; MG/1
1 TABLET ORAL DAILY
Qty: 90 TABLET | Refills: 3 | Status: SHIPPED | OUTPATIENT
Start: 2021-05-07 | End: 2022-05-03

## 2021-05-07 RX ORDER — RISEDRONATE SODIUM 35 MG/1
35 TABLET, FILM COATED ORAL
Qty: 12 TABLET | Refills: 3 | Status: SHIPPED | OUTPATIENT
Start: 2021-05-07 | End: 2022-03-31

## 2021-05-07 RX ORDER — TRAZODONE HYDROCHLORIDE 100 MG/1
TABLET ORAL
Qty: 180 TABLET | Refills: 1 | Status: SHIPPED | OUTPATIENT
Start: 2021-05-07 | End: 2021-11-03

## 2021-05-07 ASSESSMENT — PAIN SCALES - GENERAL: PAINLEVEL: NO PAIN (0)

## 2021-05-07 NOTE — PROGRESS NOTES
"       (www.shef.ac.uk/FRAX or you can google \"FRAX\").        Subjective:  Ludy is a 65 year old female who presents today for follow-up on a recent abnormal bone density scan. The patient has not been previously told she has osteoporosis. The patient has had a1  vertebral compression fracture or a pathological fx in other bones to her recollection. The patient reports osteoporosis in a family member. The family member affected is her mother. The patient reports that she eats or drinks 1 servings of dairy products per day and she does  takes a  a 600 calcium supplementmg calcium supplement once daily. The patient does not take a Vitamin D supplement. The patient currently does not exercise regularly.            Current Outpatient Medications:      albuterol (PROAIR HFA/PROVENTIL HFA/VENTOLIN HFA) 108 (90 Base) MCG/ACT inhaler, Inhale 2 puffs into the lungs every 4 hours as needed for shortness of breath / dyspnea, Disp: 17 g, Rfl: 3     ALPRAZolam (XANAX) 0.25 MG tablet, Take 1 tablet (0.25 mg) by mouth 3 times daily as needed for anxiety (1 tablet 30-60 minutes before your flight), Disp: 6 tablet, Rfl: 0     citalopram (CELEXA) 20 MG tablet, Take 1 tablet (20 mg) by mouth daily, Disp: 90 tablet, Rfl: 0     fluticasone-vilanterol (BREO ELLIPTA) 200-25 MCG/INH inhaler, Inhale 1 puff by mouth once daily, Disp: 3 Inhaler, Rfl: 0     losartan-hydrochlorothiazide (HYZAAR) 50-12.5 MG tablet, Take 1 tablet by mouth daily, Disp: 90 tablet, Rfl: 0     nabumetone (RELAFEN) 750 MG tablet, 1 tablet twice a day for 10 days and then twice a day only as needed, Disp: 60 tablet, Rfl: 1     nystatin (MYCOSTATIN) 066621 UNIT/GM external cream, Apply topically 2 times daily, Disp: 30 g, Rfl: 4     oxyCODONE-acetaminophen (PERCOCET) 5-325 MG tablet, TAKE 1 TABLET BY MOUTH EVERY 6 HOURS AS NEEDED FOR PAIN. DO NOT TAKE WITH IN 6 HOURS OF TAKING ALPRAZOLAM., Disp: 45 tablet, Rfl: 0     traZODone (DESYREL) 100 MG tablet, TAKE 2 TABLETS " BY MOUTH AT BEDTIME, Disp: 180 tablet, Rfl: 0     umeclidinium (INCRUSE ELLIPTA) 62.5 MCG/INH inhaler, Inhale 1 puff into the lungs daily, Disp: 1 each, Rfl: 3  No current facility-administered medications for this visit.     Facility-Administered Medications Ordered in Other Visits:      DOBUTamine 500 mg in dextrose 5% 250 mL (adult std conc) premix, 2.5-20 mcg/kg/min, Intravenous, Continuous, Francis Lai MD, Stopped at 19 1400     metoprolol (LOPRESSOR) injection 5 mg, 5 mg, Intravenous, Q5 Min PRN, Francis Lai MD, 1 mg at 19 1406    Past Medical History:   Diagnosis Date     Generalized anxiety disorder      Hypertension goal BP (blood pressure) < 140/90      Insomnia      Intermittent asthma      Menopausal state      Osteopenia        OBJECTIVE:  BP (!) 164/86   Pulse 101   Wt 78.9 kg (174 lb)   SpO2 95%   BMI 28.08 kg/m      General:  Ludy is awake, alert, cooperative and in no apparent distress.      Study Result    BONE DENSITOMETRY  [address] [address]  2021      PATIENT: Ludy Ramos  CHART: [id number]   :  1955  AGE:  65 year old  SEX:  female   REFERRING PROVIDER:  Ace Steinberg MD     PROCEDURE:  Bone density scanning was performed using DXA technology of the lumbar spine and hip.  Scanning was performed on a Lunar Prodigy scanner.  Reporting is completed in the form of a T-score.  The T-score represents the standard deviation from peak bone mass based on a young healthy adult.     REFERENCE T-SCORES:       Normal                -1.0 and greater                                 Osteopenia         Between -1.0 and -2.5                                           Osteoporosis     -2.5 and less                                       RISK FACTORS:  Post-menopausal, Height loss of 1.5 inches, Regular alcohol intake of 3 drinks or more daily, History of vertebral fractures, follow up  "osteopenia     CURRENT TREATMENT:  Calcium     FINDINGS:               Lumbar Spine (L1-L4)      T-score:  -1.5, degenerative changes present               Left Femoral Neck            T-score:  -2.3               Right Femoral Neck          T-score:  -2.2                         Lumbar (L1-L4) BMD: 1.005  Previous: 0.981                          Total Hip Mean BMD: 0.814  Previous: 0.843     Comparison is made to another DXA performed on the same Lunar Prodigy  machine on 4/6/2006.       LATERAL VERTEBRAL ASSESSMENT  Procedure:  Vertebral fracture assessment was performed in the lateral decubitus position using a Lunar Prodigy  densitometer.  Indications for VFA: T-score of -1.0 or worse, age (female>69), height loss > 1.5\" and history of vertebral fracture  Confounding factors for VFA: None.  The LVA scan is interpretable from T6 to L4.     VFA Findings: Using the semi-quantitative analysis of Shea there was evidence of spinal deformity as follows:  mild (grade 1) biconcave fracture of T12, and severe (grade 3) biconcave fracture of L2   VFA Impression: Ludy Ramos has 2 vertebral fractures identified on the LVA.  If alternative etiologies for the presence of vertebral fractures are excluded, the diagnosis is consistent with severe osteoporosis.         IMPRESSION  Severe osteoporosis on the basis of multiple vertebral fractures.      There has been no significant change in bone density of the lumbar spine. There has been no significant change in bone density of the hip(s).      Recommendations include ensuring adequate Calcium and Vitamin D.     The current NOF Guidelines recommend treatment for patients with prior hip or vertebral fracture, T-score -2.5 or below, or 10 year risk of any major osteoporotic fracture >20% or 10 year risk of hip fracture >3%, as calculated using the FRAX calculator (www.shef.ac.uk/FRAX or you can google \"FRAX\").       Based on these guidelines, treatment (in addition to calcium " and vitamin D) is recommended for this patient, after ruling out other causes of osteoporosis.  This is meant as an aid to clinical decision making; one must still use clinical judgement.        Follow up can be considered in 2 years.   ___________________  Toshia Vanegas M.D.  Electronically signed       16% risk of any major osteoporotic fracture via the FRAX tool       ASSESSMENT:  A 65 year old female with a diagnosis of who severe osteoporosis.     Plan: The patient was advised of the importance of getting 1800mg of calcium daily. A patient education pamphlet was printed up for her to take home. We discussed the importance of getting 400-800 IU of Vitamin D daily and I did recommmend obtaining a supplement. We dissussed the importance of weight bearing exercise and how that can stimulate the boned to become stronger. We also discussed the bisphosphonate medications and how these can improve or stabilze the bone density. At this time the patient is interested in starting a medication. We will plan on repeating the DEXA scan in 2 years to further evaluation the progression of the patient's severe osteoporosis.   Ludy  voiced understanding to informations discussed today.    --------------------------------------------------------------------------------------------------------------------------------------  SUBJECTIVE:  Ludy Ramos is a 65 year old female who is seen for  bilateral hip pain.   This has worsened gradually  She denies any injury  Symptoms have been worsening since that time.    Patients past medical, surgical, social and family histories reviewed.       OBJECTIVE:   BP (!) 164/86   Pulse 101   Wt 78.9 kg (174 lb)   SpO2 95%   BMI 28.08 kg/m      EXAM:  GENERAL APPEARANCE: healthy, alert and no distress   GAIT: NORMAL, broad-based and slow  SKIN: no suspicious lesions or rashes  NEURO: normal strength and tone, sentation intact, reflexes normal, DTR symmetrically normal in upper extremities  and DTR symmetrically normal in lower extremities  PSYCH:  mentation appears normal and affect normal/bright    MUSCULOSKELETAL:      Pain was  ilicited with passive external and internal rotation of the right and left hip        X-RAY INTERPRETATION:    Narrative & Impression     PELVIS AND HIP BILATERAL TWO VIEWS   5/7/2021 12:16 PM      HISTORY:  Bilateral hip pain.     FINDINGS:  Mild lower lumbar spine degenerative change.                                                                      IMPRESSION: Moderate left hip joint space narrowing superomedially.  Unremarkable right hip. No fracture identified.      EUGENIO SALAZAR MD         ASSESSMENT  Bilateral(ly) osteoarthritis of the hips    PLAN:  Refer to orthopedic surgery

## 2021-05-07 NOTE — PATIENT INSTRUCTIONS
Start taking calcium 600 mg tablet with breakfast and with dinner.   Get 2 dairy products per day at least.    Take a Vitamin D3 supplement 1,000 international unit(s) a day(s)

## 2021-05-12 DIAGNOSIS — J45.40 MODERATE PERSISTENT ASTHMA, UNCOMPLICATED: ICD-10-CM

## 2021-06-28 DIAGNOSIS — S32.028D OTHER CLOSED FRACTURE OF SECOND LUMBAR VERTEBRA WITH ROUTINE HEALING, SUBSEQUENT ENCOUNTER: ICD-10-CM

## 2021-06-28 RX ORDER — OXYCODONE AND ACETAMINOPHEN 5; 325 MG/1; MG/1
TABLET ORAL
Qty: 45 TABLET | Refills: 0 | Status: SHIPPED | OUTPATIENT
Start: 2021-06-28 | End: 2021-08-04

## 2021-06-28 NOTE — TELEPHONE ENCOUNTER
Routing refill request to provider for review/approval because:  Drug not on the [facility] refill protocol     Aisha LIZARRAGAN, RN     Walk in

## 2021-08-04 DIAGNOSIS — S32.028D OTHER CLOSED FRACTURE OF SECOND LUMBAR VERTEBRA WITH ROUTINE HEALING, SUBSEQUENT ENCOUNTER: ICD-10-CM

## 2021-08-04 RX ORDER — OXYCODONE AND ACETAMINOPHEN 5; 325 MG/1; MG/1
TABLET ORAL
Qty: 45 TABLET | Refills: 0 | Status: SHIPPED | OUTPATIENT
Start: 2021-08-04 | End: 2021-09-07

## 2021-08-18 ENCOUNTER — OFFICE VISIT (OUTPATIENT)
Dept: FAMILY MEDICINE | Facility: CLINIC | Age: 66
End: 2021-08-18
Payer: COMMERCIAL

## 2021-08-18 VITALS
HEART RATE: 74 BPM | TEMPERATURE: 98.2 F | DIASTOLIC BLOOD PRESSURE: 90 MMHG | OXYGEN SATURATION: 97 % | BODY MASS INDEX: 28.47 KG/M2 | WEIGHT: 176.4 LBS | SYSTOLIC BLOOD PRESSURE: 156 MMHG | RESPIRATION RATE: 20 BRPM

## 2021-08-18 DIAGNOSIS — Z83.49 FAMILY HISTORY OF HEMOCHROMATOSIS: ICD-10-CM

## 2021-08-18 DIAGNOSIS — M25.552 HIP PAIN, BILATERAL: Primary | ICD-10-CM

## 2021-08-18 DIAGNOSIS — S22.080S COMPRESSION FRACTURE OF T12 VERTEBRA, SEQUELA: ICD-10-CM

## 2021-08-18 DIAGNOSIS — S32.019S CLOSED FRACTURE OF FIRST LUMBAR VERTEBRA, UNSPECIFIED FRACTURE MORPHOLOGY, SEQUELA: ICD-10-CM

## 2021-08-18 DIAGNOSIS — M25.551 HIP PAIN, BILATERAL: Primary | ICD-10-CM

## 2021-08-18 LAB
FERRITIN SERPL-MCNC: 334 NG/ML (ref 8–252)
IRON SATN MFR SERPL: 85 % (ref 15–46)
IRON SERPL-MCNC: 152 UG/DL (ref 35–180)
TIBC SERPL-MCNC: 179 UG/DL (ref 240–430)

## 2021-08-18 PROCEDURE — 83550 IRON BINDING TEST: CPT | Performed by: FAMILY MEDICINE

## 2021-08-18 PROCEDURE — 82728 ASSAY OF FERRITIN: CPT | Performed by: FAMILY MEDICINE

## 2021-08-18 PROCEDURE — 36415 COLL VENOUS BLD VENIPUNCTURE: CPT | Performed by: FAMILY MEDICINE

## 2021-08-18 PROCEDURE — 99214 OFFICE O/P EST MOD 30 MIN: CPT | Performed by: FAMILY MEDICINE

## 2021-08-18 ASSESSMENT — PAIN SCALES - GENERAL: PAINLEVEL: MODERATE PAIN (5)

## 2021-08-18 NOTE — PROGRESS NOTES
SUBJECTIVE:  Ludy Ramos is a 65 year old female who scheduled an appointment to discuss the following issues:      Her brother has been diagnosed with hemochromatosis and is getting therapeutic phlebotomies to treat his condition.  She brought in the lab test he had showing the hemochromatosis mutation. He was positive for two copies of the HFE gene.    She does not remember ever being tested for hemochromatosis.      She is continue to have pain in her lower back which she describes as a 5 at rest and when she has to walk it can get up to a 9.  It has gotten somewhat better since the original injury over a year ago.  However she reports that her pain has plateaued.      She was planned to see an orthopedist about her hips but she went on vacation and had to cancel those appointments.  She is wishing to reschedule those.  Her left hip is the primary source of pain.        She is requesting a disability parking certificate form to be completed for her today.  She reports that after work walking a short distance her back is very painful and she needs to stop.            Past Medical, social, family histories, medications, and allergies reviewed and updated   ROS: other than that noted above all other review of systems was negative    ROS:       Current Outpatient Medications:      albuterol (PROAIR HFA/PROVENTIL HFA/VENTOLIN HFA) 108 (90 Base) MCG/ACT inhaler, Inhale 2 puffs into the lungs every 4 hours as needed for shortness of breath / dyspnea, Disp: 17 g, Rfl: 3     ALPRAZolam (XANAX) 0.25 MG tablet, Take 1 tablet (0.25 mg) by mouth 3 times daily as needed for anxiety (1 tablet 30-60 minutes before your flight), Disp: 6 tablet, Rfl: 0     BREO ELLIPTA 200-25 MCG/INH Inhaler, Inhale 1 puff by mouth once daily, Disp: 3 each, Rfl: 0     citalopram (CELEXA) 20 MG tablet, Take 1 tablet (20 mg) by mouth daily, Disp: 90 tablet, Rfl: 1     losartan-hydrochlorothiazide (HYZAAR) 50-12.5 MG tablet, Take 1 tablet by  mouth daily, Disp: 90 tablet, Rfl: 3     nabumetone (RELAFEN) 750 MG tablet, 1 tablet twice a day for 10 days and then twice a day only as needed, Disp: 60 tablet, Rfl: 1     nystatin (MYCOSTATIN) 637954 UNIT/GM external cream, Apply topically 2 times daily, Disp: 30 g, Rfl: 4     oxyCODONE-acetaminophen (PERCOCET) 5-325 MG tablet, TAKE 1 TABLET BY MOUTH EVERY 6 HOURS AS NEEDED FOR PAIN. DO NOT TAKE WITHIN 6 HOURS OF TAKING ALPRAZOLAM., Disp: 45 tablet, Rfl: 0     RISEdronate (ACTONEL) 35 MG tablet, Take 1 tablet (35 mg) by mouth every 7 days, Disp: 12 tablet, Rfl: 3     traZODone (DESYREL) 100 MG tablet, TAKE 2 TABLETS BY MOUTH AT BEDTIME, Disp: 180 tablet, Rfl: 1     umeclidinium (INCRUSE ELLIPTA) 62.5 MCG/INH inhaler, Inhale 1 puff into the lungs daily, Disp: 1 each, Rfl: 3  No current facility-administered medications for this visit.    Facility-Administered Medications Ordered in Other Visits:      DOBUTamine 500 mg in dextrose 5% 250 mL (adult std conc) premix, 2.5-20 mcg/kg/min, Intravenous, Continuous, Francis Lai MD, Stopped at 05/23/19 1400     metoprolol (LOPRESSOR) injection 5 mg, 5 mg, Intravenous, Q5 Min PRN, Francis Lai MD, 1 mg at 05/23/19 1406    OBJECTIVE:  BP (!) 156/90   Pulse 74   Temp 98.2  F (36.8  C) (Oral)   Resp 20   Wt 80 kg (176 lb 6.4 oz)   SpO2 97%   BMI 28.47 kg/m      EXAM:  GENERAL APPEARANCE: healthy, alert and no distress  EYES: EOMI,  PERRL  HENT: ear canals and TM's normal and nose and mouth without ulcers or lesions  RESP: lungs clear to auscultation - no rales, rhonchi or wheezes  CV: regular rates and rhythm, normal S1 S2, no S3 or S4 and no murmur, click or rub -  ABDOMEN:  soft, nontender, no HSM or masses and bowel sounds normal      Narrative & Impression   PELVIS AND HIP BILATERAL TWO VIEWS   5/7/2021 12:16 PM      HISTORY:  Bilateral hip pain.     FINDINGS:  Mild lower lumbar spine degenerative change.                                                                       IMPRESSION: Moderate left hip joint space narrowing superomedially.  Unremarkable right hip. No fracture identified.      EUGENIO SALAZAR MD         No results found for any visits on 08/18/21.      ASSESSMENT/PLAN:      (M25.551,  M25.552) Hip pain, bilateral  (primary encounter diagnosis)  Comment:   Plan: Orthopedic  Referral            (Z83.49) Family history of hemochromatosis  Comment:   Plan: Iron and iron binding capacity, Ferritin            (S32.019S) Closed fracture of first lumbar vertebra, unspecified fracture morphology, sequela  Comment:   Plan: Pain Management Referral            (S22.080S) Compression fracture of T12 vertebra, sequela  Comment:   Plan: Pain Management Referral

## 2021-08-18 NOTE — NURSING NOTE
"Chief Complaint   Patient presents with     Forms     Health Maintenance     ACT, AAP        Initial BP (!) 156/90   Pulse 74   Temp 98.2  F (36.8  C) (Oral)   Resp 20   Wt 80 kg (176 lb 6.4 oz)   SpO2 97%   BMI 28.47 kg/m   Estimated body mass index is 28.47 kg/m  as calculated from the following:    Height as of 2/25/21: 1.676 m (5' 6\").    Weight as of this encounter: 80 kg (176 lb 6.4 oz).  Medication Reconciliation: complete  Ezra Downs CMA    "

## 2021-08-19 DIAGNOSIS — J45.40 MODERATE PERSISTENT ASTHMA, UNCOMPLICATED: ICD-10-CM

## 2021-08-19 DIAGNOSIS — R79.89 ELEVATED FERRITIN: Primary | ICD-10-CM

## 2021-08-19 ASSESSMENT — ASTHMA QUESTIONNAIRES: ACT_TOTALSCORE: 16

## 2021-08-23 NOTE — PROGRESS NOTES
RECORDS STATUS - ALL OTHER DIAGNOSIS      RECORDS RECEIVED FROM: [facility]   DATE RECEIVED: 12/16/2021    NOTES STATUS DETAILS   OFFICE NOTE from referring provider Complete Ace Steinberg MD   OFFICE NOTE from medical oncologist N/A    DISCHARGE SUMMARY from hospital N/A    DISCHARGE REPORT from the ER     OPERATIVE REPORT N/A    MEDICATION LIST Complete [facility]   CLINICAL TRIAL TREATMENTS TO DATE     LABS     PATHOLOGY REPORTS N/A    ANYTHING RELATED TO DIAGNOSIS Complete Labs last updated on 8/18/2021   GENONOMIC TESTING     TYPE:     IMAGING (NEED IMAGES & REPORT)     CT SCANS     MRI     MAMMO     ULTRASOUND     PET

## 2021-09-02 ENCOUNTER — OFFICE VISIT (OUTPATIENT)
Dept: ORTHOPEDICS | Facility: CLINIC | Age: 66
End: 2021-09-02
Payer: COMMERCIAL

## 2021-09-02 VITALS
WEIGHT: 179.8 LBS | SYSTOLIC BLOOD PRESSURE: 172 MMHG | BODY MASS INDEX: 28.22 KG/M2 | DIASTOLIC BLOOD PRESSURE: 93 MMHG | HEIGHT: 67 IN | HEART RATE: 91 BPM | OXYGEN SATURATION: 95 %

## 2021-09-02 DIAGNOSIS — M25.852 LEFT HIP IMPINGEMENT SYNDROME: Primary | ICD-10-CM

## 2021-09-02 PROCEDURE — 99214 OFFICE O/P EST MOD 30 MIN: CPT | Performed by: ORTHOPAEDIC SURGERY

## 2021-09-02 ASSESSMENT — MIFFLIN-ST. JEOR: SCORE: 1380.26

## 2021-09-02 NOTE — LETTER
9/2/2021         RE: Ludy Ramos  [address]  [facility] [id number]        Dear Colleague,    Thank you for referring your patient, Ludy Ramos, to the [facility]. Please see a copy of my visit note below.    SUBJECTIVE:  Ludy Ramos is a 65 year old female who is seen in consultation at the request of Dr. Steinberg for  bilateral hip pain that started   years ago.   left > right  pain  Cause: unknown  Symptoms:   Hurts to walk hills   Pain of flat ground  Weather affects it   Pain laterally  Pain at night--positional, if lays on that side.    No radicular symptoms     Symptoms have been worsening .    Prior history of related problems: no prior problems with this area in the past, previous low back problems with history of L2 vertebral fracture .    Treatment up to this point: previous greater trochanteric bursa injections (based on her description) that didn't last long.  Thinks she had physical therapy     Orthopedic PMH:     Past medical history:   Past Medical History:   Diagnosis Date     Generalized anxiety disorder      Hypertension goal BP (blood pressure) < 140/90      Insomnia      Intermittent asthma      Menopausal state      Osteopenia        Surgical:   Past Surgical History:   Procedure Laterality Date     COLONOSCOPY       COLONOSCOPY  5/20/2014    Procedure: COLONOSCOPY;  Surgeon: Jean Hastings MD;  Location: [facility]     COLONOSCOPY  5/20/2014    Procedure: COMBINED COLONOSCOPY, SINGLE BIOPSY/POLYPECTOMY BY BIOPSY;  Surgeon: Jean Hastings MD;  Location: [facility]     COLONOSCOPY N/A 5/5/2017    Procedure: COMBINED COLONOSCOPY, SINGLE OR MULTIPLE BIOPSY/POLYPECTOMY BY BIOPSY;;  Surgeon: Duane, William Charles, MD;  Location: [facility] OR     COLONOSCOPY WITH CO2 INSUFFLATION N/A 5/5/2017    Procedure: COLONOSCOPY WITH CO2 INSUFFLATION;  colonoscopy, History of colonic polyps  BMI 25.74  [facility], fax: [phone];  Surgeon: Duane,  Ace Quintana MD;  Location: [facility] OR     COSMETIC SURGERY       FINGER SURGERY      Left 4th digit     HYSTERECTOMY, JONAS       SURGICAL HISTORY OF -       Right 2nd digit     TONSILLECTOMY         Family Hx:    Family History   Problem Relation Age of Onset     Respiratory Mother         copd     Respiratory Father         empysema     Melanoma No family hx of        Social Hx:   Social History     Socioeconomic History     Marital status:      Spouse name: None     Number of children: None     Years of education: None     Highest education level: None   Occupational History     None   Tobacco Use     Smoking status: Former Smoker     Packs/day: 0.00     Years: 20.00     Pack years: 0.00     Start date: 1980     Quit date: 2001     Years since quittin.7     Smokeless tobacco: Never Used   Vaping Use     Vaping Use: Never used   Substance and Sexual Activity     Alcohol use: Yes     Alcohol/week: 10.0 standard drinks     Drug use: No     Sexual activity: Yes     Partners: Male     Birth control/protection: Surgical     Comment: Hyst   Other Topics Concern     Parent/sibling w/ CABG, MI or angioplasty before 65F 55M? No   Social History Narrative     None     Social Determinants of Health     Financial Resource Strain:      Difficulty of Paying Living Expenses:    Food Insecurity:      Worried About Running Out of Food in the Last Year:      Ran Out of Food in the Last Year:    Transportation Needs:      Lack of Transportation (Medical):      Lack of Transportation (Non-Medical):    Physical Activity:      Days of Exercise per Week:      Minutes of Exercise per Session:    Stress:      Feeling of Stress :    Social Connections:      Frequency of Communication with Friends and Family:      Frequency of Social Gatherings with Friends and Family:      Attends Baptism Services:      Active Member of Clubs or Organizations:      Attends Club or Organization Meetings:      Marital Status:   "  Intimate Partner Violence:      Fear of Current or Ex-Partner:      Emotionally Abused:      Physically Abused:      Sexually Abused:        REVIEW OF SYSTEMS:    CONSTITUTIONAL:  NEGATIVE for fever, chills, change in weight  INTEGUMENTARY/SKIN:  NEGATIVE for worrisome rashes, moles or lesions  EYES:  NEGATIVE for vision changes or irritation  ENT/MOUTH:  NEGATIVE for ear, mouth and throat problems  RESP:  NEGATIVE for significant cough or SOB  BREAST:  NEGATIVE for masses, tenderness or discharge  CV:  NEGATIVE for chest pain, palpitations or peripheral edema  GI:  NEGATIVE for nausea, abdominal pain, heartburn, or change in bowel habits  :  Negative   MUSCULOSKELETAL:  See HPI above  NEURO:  NEGATIVE for weakness, dizziness or paresthesias  ENDOCRINE:  NEGATIVE for temperature intolerance, skin/hair changes  HEME/ALLERGY/IMMUNE:  NEGATIVE for bleeding problems  PSYCHIATRIC:  NEGATIVE for changes in mood or affect    BP (!) 172/93   Pulse 91   Ht 1.689 m (5' 6.5\")   Wt 81.6 kg (179 lb 12.8 oz)   SpO2 95%   BMI 28.59 kg/m      EXAM:  GENERAL APPEARANCE: healthy, alert and no distress   GAIT: NORMAL  SKIN: no suspicious lesions or rashes  NEURO: normal strength and tone, sentation intact, reflexes normal, DTR symmetrically normal in upper extremities and DTR symmetrically normal in lower extremities  PSYCH:  mentation appears normal and affect normal/bright  VASCULAR:  pulses intact, good cappillary refill  LYMPH:  no lymphadenopathy  RESP:  no overt rhonchi or weazes    MUSCULOSKELETAL:  LEFT HIP:  Palpation: Tender:   left greater trochanter, sciatic notch--severe without radiation.     Range of Motion:  Left Hip  Full flexion, no flexion contracture, IR 25, ER 30*, abduction 30*  Strength:  abduction: 5-/5  Special tests:  GREGORIA pain, FADIR painfree      Lumbar range of motion: flexion to touch mid shin  Toe stand: able.    Heel stand: Able  Seated SLR: negative  Supine SLR: " equivocal  Sensation:normal  Motor: all normal      X-RAY INTERPRETATION:  Mild narrowing of both hip joints. Minimal hypertrophic changes.    Lumbar spine: 6/24/20  There are age indeterminant compression fractures of T12 in  the superior endplate of L2. These are new since 11/23/2010. There is  loss of disc space height at L5-S1..      ASSESSMENT/PLAN:  Possible that this hip pain is referred from the lumbar spine, vs hip joint origin.  She hasn't responded to greater trochanteric bursal injections in the past.  She could have synovitis. She is being worked-up for hemachromatosis    Plan: she does not want another greater trochanteric bursa injection   A left hip MRI was ordered. We will proceed from there.  She may need work-up of the back, vs intra-articular corticosteroid injection vs ?    Follow up in the office / virtual visit/ MyChart for the results.      CECIILA Heaton MD  Dept. Orthopedic Surgery  [facility]              Again, thank you for allowing me to participate in the care of your patient.        Sincerely,        Gee Heaton MD

## 2021-09-02 NOTE — PROGRESS NOTES
SUBJECTIVE:  Ludy Ramos is a 65 year old female who is seen in consultation at the request of Dr. Steinberg for  bilateral hip pain that started   years ago.   left > right  pain  Cause: unknown  Symptoms:   Hurts to walk hills   Pain of flat ground  Weather affects it   Pain laterally  Pain at night--positional, if lays on that side.    No radicular symptoms     Symptoms have been worsening .    Prior history of related problems: no prior problems with this area in the past, previous low back problems with history of L2 vertebral fracture .    Treatment up to this point: previous greater trochanteric bursa injections (based on her description) that didn't last long.  Thinks she had physical therapy     Orthopedic PMH:     Past medical history:   Past Medical History:   Diagnosis Date     Generalized anxiety disorder      Hypertension goal BP (blood pressure) < 140/90      Insomnia      Intermittent asthma      Menopausal state      Osteopenia        Surgical:   Past Surgical History:   Procedure Laterality Date     COLONOSCOPY       COLONOSCOPY  5/20/2014    Procedure: COLONOSCOPY;  Surgeon: Jean Hastings MD;  Location: [facility]     COLONOSCOPY  5/20/2014    Procedure: COMBINED COLONOSCOPY, SINGLE BIOPSY/POLYPECTOMY BY BIOPSY;  Surgeon: Jean Hastings MD;  [facility]     COLONOSCOPY N/A 5/5/2017    Procedure: COMBINED COLONOSCOPY, SINGLE OR MULTIPLE BIOPSY/POLYPECTOMY BY BIOPSY;;  Surgeon: Duane, William Charles, MD;  Location: [facility]     COLONOSCOPY WITH CO2 INSUFFLATION N/A 5/5/2017    Procedure: COLONOSCOPY WITH CO2 INSUFFLATION;  colonoscopy, History of colonic polyps  BMI 25.74  [facility], fax: [phone];  Surgeon: Duane, William Charles, MD;  Location: [facility]     COSMETIC SURGERY       FINGER SURGERY      Left 4th digit     HYSTERECTOMY, JONAS       SURGICAL HISTORY OF -       Right 2nd digit     TONSILLECTOMY         Family Hx:    Family History   Problem Relation Age of  Onset     Respiratory Mother         copd     Respiratory Father         empysema     Melanoma No family hx of        Social Hx:   Social History     Socioeconomic History     Marital status:      Spouse name: None     Number of children: None     Years of education: None     Highest education level: None   Occupational History     None   Tobacco Use     Smoking status: Former Smoker     Packs/day: 0.00     Years: 20.00     Pack years: 0.00     Start date: 1980     Quit date: 2001     Years since quittin.7     Smokeless tobacco: Never Used   Vaping Use     Vaping Use: Never used   Substance and Sexual Activity     Alcohol use: Yes     Alcohol/week: 10.0 standard drinks     Drug use: No     Sexual activity: Yes     Partners: Male     Birth control/protection: Surgical     Comment: Hyst   Other Topics Concern     Parent/sibling w/ CABG, MI or angioplasty before 65F 55M? No   Social History Narrative     None     Social Determinants of Health     Financial Resource Strain:      Difficulty of Paying Living Expenses:    Food Insecurity:      Worried About Running Out of Food in the Last Year:      Ran Out of Food in the Last Year:    Transportation Needs:      Lack of Transportation (Medical):      Lack of Transportation (Non-Medical):    Physical Activity:      Days of Exercise per Week:      Minutes of Exercise per Session:    Stress:      Feeling of Stress :    Social Connections:      Frequency of Communication with Friends and Family:      Frequency of Social Gatherings with Friends and Family:      Attends [employer] Services:      Active Member of Clubs or Organizations:      Attends Club or Organization Meetings:      Marital Status:    Intimate Partner Violence:      Fear of Current or Ex-Partner:      Emotionally Abused:      Physically Abused:      Sexually Abused:        REVIEW OF SYSTEMS:    CONSTITUTIONAL:  NEGATIVE for fever, chills, change in weight  INTEGUMENTARY/SKIN:  NEGATIVE for  "worrisome rashes, moles or lesions  EYES:  NEGATIVE for vision changes or irritation  ENT/MOUTH:  NEGATIVE for ear, mouth and throat problems  RESP:  NEGATIVE for significant cough or SOB  BREAST:  NEGATIVE for masses, tenderness or discharge  CV:  NEGATIVE for chest pain, palpitations or peripheral edema  GI:  NEGATIVE for nausea, abdominal pain, heartburn, or change in bowel habits  :  Negative   MUSCULOSKELETAL:  See HPI above  NEURO:  NEGATIVE for weakness, dizziness or paresthesias  ENDOCRINE:  NEGATIVE for temperature intolerance, skin/hair changes  HEME/ALLERGY/IMMUNE:  NEGATIVE for bleeding problems  PSYCHIATRIC:  NEGATIVE for changes in mood or affect    BP (!) 172/93   Pulse 91   Ht 1.689 m (5' 6.5\")   Wt 81.6 kg (179 lb 12.8 oz)   SpO2 95%   BMI 28.59 kg/m      EXAM:  GENERAL APPEARANCE: healthy, alert and no distress   GAIT: NORMAL  SKIN: no suspicious lesions or rashes  NEURO: normal strength and tone, sentation intact, reflexes normal, DTR symmetrically normal in upper extremities and DTR symmetrically normal in lower extremities  PSYCH:  mentation appears normal and affect normal/bright  VASCULAR:  pulses intact, good cappillary refill  LYMPH:  no lymphadenopathy  RESP:  no overt rhonchi or weazes    MUSCULOSKELETAL:  LEFT HIP:  Palpation: Tender:   left greater trochanter, sciatic notch--severe without radiation.     Range of Motion:  Left Hip  Full flexion, no flexion contracture, IR 25, ER 30*, abduction 30*  Strength:  abduction: 5-/5  Special tests:  GREGORIA pain, FADIR painfree      Lumbar range of motion: flexion to touch mid shin  Toe stand: able.    Heel stand: Able  Seated SLR: negative  Supine SLR: equivocal  Sensation:normal  Motor: all normal      X-RAY INTERPRETATION:  Mild narrowing of both hip joints. Minimal hypertrophic changes.    Lumbar spine: 6/24/20  There are age indeterminant compression fractures of T12 in  the superior endplate of L2. These are new since 11/23/2010. " There is  loss of disc space height at L5-S1..      ASSESSMENT/PLAN:  Possible that this hip pain is referred from the lumbar spine, vs hip joint origin.  She hasn't responded to greater trochanteric bursal injections in the past.  She could have synovitis. She is being worked-up for hemachromatosis    Plan: she does not want another greater trochanteric bursa injection   A left hip MRI was ordered. We will proceed from there.  She may need work-up of the back, vs intra-articular corticosteroid injection vs ?    Follow up in the office / virtual visit/ MyChart for the results.      CECILIA Heaton MD  Dept. Orthopedic Surgery  [facility]

## 2021-09-07 DIAGNOSIS — S32.028D OTHER CLOSED FRACTURE OF SECOND LUMBAR VERTEBRA WITH ROUTINE HEALING, SUBSEQUENT ENCOUNTER: ICD-10-CM

## 2021-09-07 RX ORDER — OXYCODONE AND ACETAMINOPHEN 5; 325 MG/1; MG/1
TABLET ORAL
Qty: 45 TABLET | Refills: 0 | Status: SHIPPED | OUTPATIENT
Start: 2021-09-07 | End: 2021-10-11

## 2021-09-13 ENCOUNTER — ANCILLARY PROCEDURE (OUTPATIENT)
Dept: MRI IMAGING | Facility: CLINIC | Age: 66
End: 2021-09-13
Attending: ORTHOPAEDIC SURGERY
Payer: COMMERCIAL

## 2021-09-13 DIAGNOSIS — M25.852 LEFT HIP IMPINGEMENT SYNDROME: ICD-10-CM

## 2021-09-13 PROCEDURE — 73721 MRI JNT OF LWR EXTRE W/O DYE: CPT | Mod: LT | Performed by: RADIOLOGY

## 2021-09-14 DIAGNOSIS — M25.852 LEFT HIP IMPINGEMENT SYNDROME: Primary | ICD-10-CM

## 2021-09-14 NOTE — PROGRESS NOTES
"[facility] Consultation    Date of visit: 9/15/2021    Reason for consultation:    Ludy Ramos is a 65 year old female who is seen in consultation today at the request of her provider, Dr. Ace Steinberg ([facility]) re: patient's closed fracture of 1st lumbar vertebra, unspecified fracture morphology, sequela, compression fracture of T12 vertebra, sequela.      Primary Care Provider is Ace Steinberg  Pain medications are being prescribed by [place]  review shows patient has been on oxycodone/acetaminophen 5/325mg #45 per month from her Primary Care Provider Dr. Ace Steinberg    Please see the [facility] [place] health questionnaire which the patient completed and reviewed with me in detail.    Chief Complaint:  Low back pain  Chief Complaint   Patient presents with     Pain       Pain history:  Ludy Ramos is a 65 year old female who first started having problems with pain as follows:    Low back pain on the left side began about 2 years ago after a lumbar vertebral fracture after a fall. She has osteopenia. She has had a recent MRI yesterday and will be having a  cortisone injection in the left hip on 9/28/2021.         Pain rating: intensity ranges from 6/10 to 10/10, and Averages 8/10 on a 0-10 scale.  Describes pain as \"achy constant pain.\"  Pain is constant.    Home self care includes: she has stopped most of her activities    Aggravating factors include: walking  or laying on the left side    Relieving factors include: sitting down     Any bowel or bladder incontinence: none    Current pain-related medication treatments include:  -oxycodone/acetaminophen 5/325mg take 1 tab Q 6 hours PRN pain don't take within 6 hours of alprazolam (helpful, use once per day every day)    Other pertinent medications:  -alprazolam 0.25mg take 1 tab TID PRN anxiety, take 30-60 minutes prior to flight (uses rarely for flying)  -Celexa 20mg every day " (helpful)  -trazodone 100mg take 2 tabs at bedtime (helpful for sleep)        Previous medication treatments included:  OPIATES: Percocet (helpful)  NSAIDS: Nabumetone (not helpful), ibuprofen (not helpful), Aleve (not helpful)  MUSCLE RELAXANTS: cyclobenzaprine (not helpful)  ANTI-MIGRAINE MEDS: none  ANTI-DEPRESSANTS: Celexa (helpful), trazodone (helpful)  SLEEP AIDS: none  ANTI-CONVULSANTS: none  TOPICALS: Voltaren gel (not helpful), Lidocaine patch (not helpful)  Other meds: Tylenol (not helpful)  Medical Cannabis (not helpful)      Other treatments have included:  Ludy Ramos has been seen at a pain clinic in the past.  See here by me for neck pain in 2016  PT: tried for neck pain, not helpful  Chiropractic care: none  Acupuncture: none  TENs Unit: tried, not helpful for back pain    Injections:   Has had 2 previous injections in the left hip, many years ago. Neither were helpful      Past Medical History:  Past Medical History:   Diagnosis Date     Generalized anxiety disorder      Hypertension goal BP (blood pressure) < 140/90      Insomnia      Intermittent asthma      Menopausal state      Osteopenia      Past Surgical History:  Past Surgical History:   Procedure Laterality Date     COLONOSCOPY       COLONOSCOPY  5/20/2014    Procedure: COLONOSCOPY;  Surgeon: Jean Hastings MD;  Location: [facility] OR     COLONOSCOPY  5/20/2014    Procedure: COMBINED COLONOSCOPY, SINGLE BIOPSY/POLYPECTOMY BY BIOPSY;  Surgeon: Jean Hastings MD;  Location: [facility] OR     COLONOSCOPY N/A 5/5/2017    Procedure: COMBINED COLONOSCOPY, SINGLE OR MULTIPLE BIOPSY/POLYPECTOMY BY BIOPSY;;  Surgeon: Duane, William Charles, MD;  Location: [facility] OR     COLONOSCOPY WITH CO2 INSUFFLATION N/A 5/5/2017    Procedure: COLONOSCOPY WITH CO2 INSUFFLATION;  colonoscopy, History of colonic polyps  BMI 25.74  [employer], fax: [phone];  Surgeon: Duane, William Charles, MD;  Location:  OR     COSMETIC SURGERY       FINGER  SURGERY      Left 4th digit     HYSTERECTOMY, JONAS       SURGICAL HISTORY OF -       Right 2nd digit     TONSILLECTOMY       Medications:  Current Outpatient Medications   Medication Sig Dispense Refill     albuterol (PROAIR HFA/PROVENTIL HFA/VENTOLIN HFA) 108 (90 Base) MCG/ACT inhaler Inhale 2 puffs into the lungs every 4 hours as needed for shortness of breath / dyspnea 17 g 3     BREO ELLIPTA 200-25 MCG/INH Inhaler Inhale 1 puff by mouth once daily 3 each 0     citalopram (CELEXA) 20 MG tablet Take 1 tablet (20 mg) by mouth daily 90 tablet 1     losartan-hydrochlorothiazide (HYZAAR) 50-12.5 MG tablet Take 1 tablet by mouth daily 90 tablet 3     oxyCODONE-acetaminophen (PERCOCET) 5-325 MG tablet TAKE 1 TABLET BY MOUTH EVERY 6 HOURS AS NEEDED FOR PAIN. DO NOT TAKE WITHIN 6 HOURS OF TAKING ALPRAZOLAM. 45 tablet 0     RISEdronate (ACTONEL) 35 MG tablet Take 1 tablet (35 mg) by mouth every 7 days 12 tablet 3     traZODone (DESYREL) 100 MG tablet TAKE 2 TABLETS BY MOUTH AT BEDTIME 180 tablet 1     ALPRAZolam (XANAX) 0.25 MG tablet Take 1 tablet (0.25 mg) by mouth 3 times daily as needed for anxiety (1 tablet 30-60 minutes before your flight) (Patient not taking: Reported on 9/15/2021) 6 tablet 0     nystatin (MYCOSTATIN) 138360 UNIT/GM external cream Apply topically 2 times daily (Patient not taking: Reported on 9/15/2021) 30 g 4     Allergies:     Allergies   Allergen Reactions     Lisinopril      angioedema     Social History:  Home situation: , lives with her spouse of 40 years  Occupation/Schooling: she is retired, she used to be a PCA.   Tobacco use: quit smoking > 20 years ago  Alcohol use: has an alcoholic drink once per week.   Drug use: marijuana years ago  History of chemical dependency treatment: none    Family history:  Family History   Problem Relation Age of Onset     Respiratory Mother         copd     Respiratory Father         empysema     Melanoma No family hx of          Review of Systems:  The  14 system ROS was reviewed with the patient and is positive for:  Constitutional: fever/chills, fatigue, weight gain, weight loss  Eyes/Head: headache, dizziness  ENT: ringing in ears  Allergy/Immune: allergies  Skin: itching, rash, hives  Hematologic: easy bruising  Respiratory: cough, wheezing, shortness of breath  Cardiovascular: swelling in feet, fainting, palpitations, chest pain  GI: abdominal pain, nausea, vomiting, diarrhea, constipation  Endocrine: steroid use  Musculoskeletal:  joint pain, arthritis, stiffness, gout, back pain, neck pain  Urinary: frequency, urgency, incontinence, hesitancy  Neurologic: weakness, numbness/tingling, seizure, stroke, memory loss  Mental health: depression, anxiety, stress, suicidal ideation      Physical Exam:  Vitals:    09/15/21 1334 09/15/21 1336   BP: (!) 174/83 (!) 163/92   Pulse: 80 77     Exam:  Constitutional: healthy, alert and no distress  Head: normocephalic. Atraumatic.   Eyes: no redness or jaundice noted   ENT: oropharnx normal.  MMM.    Cardiovascular: RRR no m/g/r   Respiratory: clear to auscultation A/P. Respirations easy and unlabored. Able to speak in full sentences without SOB or cough noted.    Gastrointestinal: soft, non-tender.  : deferred  Skin: no suspicious lesions or rashes  Psychiatric: mentation appears normal and affect normal/bright    Musculoskeletal exam:  Gait/Station/Posture: fair posture. Normal gait. Able to rise onto toes and heels. Able to perform tandem gait.     Cervical spine:    Flex:  20 degrees   Ext: 20 degrees   Rotation to right: 80 degrees   Rotation to left: 80 degrees   Ext/rotation to the right pain free   Ext/rotation to the left pain free    Thoracic spine:  Normal     Lumbar spine:    Flex:  70 degrees   Ext: 20 degrees   Rotation/ext to right: pain free   Rotation/ext to left: pain free   SI joints: left sided tenderness   Piriformis: non-tender bilaterally   Greater trochanters: left sided tenderness    Myofascial  tenderness:  none  Straight leg exam: negative  Win's maneuver: positive on the left side  GREGORIA: positive on the left side    Neurologic exam:  CN:  Cranial nerves 2-12 are  Grossly normal  Motor:  5/5 UE and LE strength  Reflexes:     Biceps:     R:  1/4 L: 1/4   Brachioradialis   R:  1/4 L: 1/4      Patella:  R:  1/4 L: 1/4   Achilles:  R:  1/4 L: 1/4  Other reflexes:  Toes downgoing   Benites's negative  Sensory:  (upper and lower extremities):   Light touch: normal    Vibration: normal    Pin prick: normal    Allodynia: absent    Dysethesia: absent    Hyperalgesia: absent         Diagnostic tests:  LUMBAR SPINE TWO-THREE  VIEWS  6/24/2020 10:10 AM      HISTORY: lower vertebral tenderness on exam after trauma, hx of  osteopenia; Acute right-sided low back pain without sciatica     COMPARISON: Film dated 11/23/2010     FINDINGS: There are age indeterminant compression fractures of T12 in  the superior endplate of L2. These are new since 11/23/2010. There is  loss of disc space height at L5-S1..                                                                      IMPRESSION:   1. Age indeterminant compression fractures of T12 and L1. These are  new since 2010.  2. Degenerative change at L5-S1.     RADHA BROWN MD        PELVIS AND HIP BILATERAL TWO VIEWS   5/7/2021 12:16 PM      HISTORY:  Bilateral hip pain.     FINDINGS:  Mild lower lumbar spine degenerative change.                                                                      IMPRESSION: Moderate left hip joint space narrowing superomedially.  Unremarkable right hip. No fracture identified.      EUGENIO SALAZAR MD        Exam: MRI of the left hip dated 9/13/2021.     COMPARISON: Radiographs dated 5/7/2021.     CLINICAL HISTORY: Hip pain.     TECHNIQUE: Multiplanar, multisequence MR imaging of the left hip was  obtained using standard sequences in 3 orthogonal planes without the  use of intravenous or intra-articular gadolinium contrast.     FINDINGS:     No  significant fluid at the left hip joint.     No marrow signal abnormalities to suggest fracture, osteonecrosis, or  marrow infiltration.     Likely diffuse cartilage thinning which is not well evaluated. No  subchondral edema. The labrum is poorly evaluated.     The neurovascular structures are unremarkable. No lymphadenopathy. The  sciatic nerve is unremarkable. No full-thickness tendon tear or tendon  retraction. Tendinosis of the hamstring tendons at their origin on the  ischial tuberosity with insertional tendinopathy of the gluteus  minimus and gluteus and medius tendons. Diverticulosis without  evidence of diverticulitis.                                                                      IMPRESSION:  1. No significant left hip joint effusion.  2. No marrow signal abnormalities in the left hip to suggest fracture,  osteonecrosis, or marrow infiltration.  3. Likely diffuse cartilage thinning, not well visualized. No definite  full-thickness cartilage loss or subchondral edema.     PIEDAD MONROE MD              Other testing (labs, diagnostics):  5/13/2019  Cr. 0.80  Est GFR 77      Screening tools:     DIRE Score for ongoing opioid management is calculated as follows:    Diagnosis = 2    Intractability = 2    Risk: Psych = 2  Chem Hlth = 2  Reliability = 2  Social = 2    Efficacy = 2    Total DIRE Score = 14 (14 or higher predicts good candidate for ongoing opioid management; 13 or lower predicts poor candidate for opioid management)         Assessment:  1. Chronic left SI joint pain  2. SI joint dysfunction of left side  3. Greater trochanteric bursitis of the left hip  4. Chronic left sided low back pain without sciatica  5. Closed fracture of first lumbar vertebra, unspecified fracture morphology/sequela  6. Compression fracture of T12 vertebra, sequela    7. PMHx includes: menopausal state, insomnia, osteopenia, intermittent asthma, generalized anxiety disorder, HTN  8. PSHx includes: tonsillectomy,  hysterectomy, right 2nd digit surgery, left 4th digit finger surgery, colonoscopy x 5, cosmetic surgery        Plan:  Diagnosis reviewed, treatment option addressed, and risk/benefits discussed.  Self-care instructions given.  I am recommending a multidisciplinary treatment plan to help this patient better manage her pain.      1. Physical Therapy: not at present  2. I am in support of you joining [employer] at the gym  3. Clinical Health Psychologist to address issues of relaxation, behavioral change, coping style, and other factors important to improvement: none  4. Diagnostic Studies: none  5. Medication Management:   1. I am OK with you using oxycodone/acetminophen 1-2 tabs per day as you are. I will leave this with Dr. Steinberg.   2. Start gabapentin 300mg Take 300mg at bedtime for 7 days, then take 300mg twice daily for 7 days, then take 300mg three times daily for 7 days, then take 300mg in the morning and afternoon and 600mg (2 capsules) at bedtime. If side effects, reduce to last tolerable dosage.   6. Further procedures recommended: schedule left SI joint and left greater trochanteric bursal injection, our office will contact you to schedule  7. I recommend post-poning the left hip joint injection with Dr. Daniel Burnett of Sports Medicine   8. Acupuncture: none  9. Urine toxicology screen today: none   10. Recommendations/follow-up for PCP:  See above  11. Release of information: none  12. Follow up: 2-4 weeks after the left SI/GT injection. Please call [phone] to make your follow-up appointment with me.             Raisa URBINA, RN CNP, FNP  [facility]

## 2021-09-15 ENCOUNTER — OFFICE VISIT (OUTPATIENT)
Dept: PALLIATIVE MEDICINE | Facility: CLINIC | Age: 66
End: 2021-09-15
Attending: FAMILY MEDICINE
Payer: COMMERCIAL

## 2021-09-15 VITALS — DIASTOLIC BLOOD PRESSURE: 92 MMHG | HEART RATE: 77 BPM | SYSTOLIC BLOOD PRESSURE: 163 MMHG

## 2021-09-15 DIAGNOSIS — S32.019S CLOSED FRACTURE OF FIRST LUMBAR VERTEBRA, UNSPECIFIED FRACTURE MORPHOLOGY, SEQUELA: ICD-10-CM

## 2021-09-15 DIAGNOSIS — G89.29 CHRONIC LEFT SI JOINT PAIN: Primary | ICD-10-CM

## 2021-09-15 DIAGNOSIS — M53.3 CHRONIC LEFT SI JOINT PAIN: Primary | ICD-10-CM

## 2021-09-15 DIAGNOSIS — M70.62 GREATER TROCHANTERIC BURSITIS OF LEFT HIP: ICD-10-CM

## 2021-09-15 DIAGNOSIS — S22.080S COMPRESSION FRACTURE OF T12 VERTEBRA, SEQUELA: ICD-10-CM

## 2021-09-15 DIAGNOSIS — M54.50 CHRONIC LEFT-SIDED LOW BACK PAIN WITHOUT SCIATICA: ICD-10-CM

## 2021-09-15 DIAGNOSIS — G89.29 CHRONIC LEFT-SIDED LOW BACK PAIN WITHOUT SCIATICA: ICD-10-CM

## 2021-09-15 DIAGNOSIS — M53.3 SACROILIAC JOINT DYSFUNCTION OF LEFT SIDE: ICD-10-CM

## 2021-09-15 PROCEDURE — 99204 OFFICE O/P NEW MOD 45 MIN: CPT | Performed by: NURSE PRACTITIONER

## 2021-09-15 RX ORDER — GABAPENTIN 300 MG/1
CAPSULE ORAL
Qty: 120 CAPSULE | Refills: 0 | Status: SHIPPED | OUTPATIENT
Start: 2021-09-15 | End: 2021-11-16 | Stop reason: SINTOL

## 2021-09-15 ASSESSMENT — PAIN SCALES - GENERAL: PAINLEVEL: MODERATE PAIN (4)

## 2021-09-15 NOTE — PATIENT INSTRUCTIONS
1. Physical Therapy: not at present  2. I am in support of you joining [employer] at the gym  3. Clinical Health Psychologist to address issues of relaxation, behavioral change, coping style, and other factors important to improvement: none  4. Diagnostic Studies: none  5. Medication Management:   1. I am OK with you using oxycodone/acetminophen 1-2 tabs per day as you are. I will leave this with Dr. Steinberg.   2. Start gabapentin 300mg Take 300mg at bedtime for 7 days, then take 300mg twice daily for 7 days, then take 300mg three times daily for 7 days, then take 300mg in the morning and afternoon and 600mg (2 capsules) at bedtime. If side effects, reduce to last tolerable dosage.   6. Further procedures recommended: schedule left SI joint and left greater trochanteric bursal injection, our office will contact you to schedule  7. I recommend post-poning the left hip joint injection with Dr. Daniel Burnett of Sports Medicine   8. Acupuncture: none  9. Urine toxicology screen today: none   10. Recommendations/follow-up for PCP:  See above  11. Release of information: none  12. Follow up: 2-4 weeks after the left SI/GT injection. Please call [phone] to make your follow-up appointment with me.       ----------------------------------------------------------------  Clinic Number:  [phone]     Call with any questions about your care and for scheduling assistance.     Calls are returned Monday through Friday between 8 AM and 4:30 PM. We usually get back to you within 2 business days depending on the issue/request.    If we are prescribing your medications:    For opioid medication refills, call the clinic or send a [employer] message 7 days in advance.  Please include:    Name of requested medication    Name of the pharmacy.    For non-opioid medications, call your pharmacy directly to request a refill. Please allow 3-4 days to be processed.     Per [place] State Law:    All controlled substance prescriptions must  be filled within 30 days of being written.      For those controlled substances allowing refills, pickup must occur within 30 days of last fill.      We believe regular attendance is key to your success in our program!      Any time you are unable to keep your appointment we ask that you call us at least 24 hours in advance to cancel.This will allow us to offer the appointment time to another patient.     Multiple missed appointments may lead to dismissal from the clinic.

## 2021-09-26 ENCOUNTER — HEALTH MAINTENANCE LETTER (OUTPATIENT)
Age: 66
End: 2021-09-26

## 2021-10-10 DIAGNOSIS — S32.028D OTHER CLOSED FRACTURE OF SECOND LUMBAR VERTEBRA WITH ROUTINE HEALING, SUBSEQUENT ENCOUNTER: ICD-10-CM

## 2021-10-11 RX ORDER — OXYCODONE AND ACETAMINOPHEN 5; 325 MG/1; MG/1
TABLET ORAL
Qty: 45 TABLET | Refills: 0 | Status: SHIPPED | OUTPATIENT
Start: 2021-10-11 | End: 2021-11-11

## 2021-10-12 ENCOUNTER — TELEPHONE (OUTPATIENT)
Dept: PALLIATIVE MEDICINE | Facility: CLINIC | Age: 66
End: 2021-10-12

## 2021-10-12 NOTE — TELEPHONE ENCOUNTER
Call back to Pt    Pt stated that she had a reaction to the gabapentin.  Pt stated sever aching pain started in her shoulders and then went into her legs with sever pain.  Pt stated her pain was a bad aching pain in her shoulders at first and she could hardly lift anything, then the same ache started in her legs and it was hard for her to walk.     Pt stopped taking gabapentin last Thursday and now her pain is better.      Pt asking if Raisa Renettaalbin would want to prescribe something different.    Pt also stated that she has not gotten a call to schedule her injections ordered on 9/15/21  Further procedures recommended: schedule left SI joint and left greater trochanteric bursal injection, our office will contact you to schedule      Will forward to Raisa to review and advise as well as the  to schedule injection.    Gilbert Griffin RN  Patient Care Supervisor   [facility]

## 2021-10-12 NOTE — TELEPHONE ENCOUNTER
Patient called and stated she is having a reaction to the gabapentin (NEURONTIN) 300 MG capsule  In her joints            Adrienne Flynn    [facility]

## 2021-10-13 NOTE — TELEPHONE ENCOUNTER
Waiting for insurance to give us the okay to schedule injection         Adrienne Flynn    [facility]

## 2021-10-15 NOTE — TELEPHONE ENCOUNTER
Insurance has given the OK for injection and patient is on Dr. Chinchilla's schedule for October 18th.     Please ask patient to schedule a follow up appt with me 2-3 weeks after the injection. Since she had a reaction to the gabapentin, let's see how she does with the injection without added medication. It can take up to 2 weeks for full effect from the injection.     Thanks.  Raisa URBINA, RN CNP, FNP  [facility]

## 2021-10-15 NOTE — TELEPHONE ENCOUNTER
Able to give message below, as written by provider, scheduled follow-up visit with CARLITOS Kennedy, CNP for when patient returns from out of town 11/16/2021    Lynsey Almaguer RN, BSN, CMSRN  RN Care Coordinator  [facility]

## 2021-10-18 ENCOUNTER — RADIOLOGY INJECTION OFFICE VISIT (OUTPATIENT)
Dept: PALLIATIVE MEDICINE | Facility: CLINIC | Age: 66
End: 2021-10-18
Attending: NURSE PRACTITIONER
Payer: COMMERCIAL

## 2021-10-18 VITALS
OXYGEN SATURATION: 96 % | HEART RATE: 85 BPM | SYSTOLIC BLOOD PRESSURE: 170 MMHG | RESPIRATION RATE: 16 BRPM | DIASTOLIC BLOOD PRESSURE: 95 MMHG

## 2021-10-18 DIAGNOSIS — M53.3 SACROILIAC JOINT DYSFUNCTION OF LEFT SIDE: ICD-10-CM

## 2021-10-18 DIAGNOSIS — M70.62 GREATER TROCHANTERIC BURSITIS OF LEFT HIP: ICD-10-CM

## 2021-10-18 PROCEDURE — 27096 INJECT SACROILIAC JOINT: CPT | Mod: LT | Performed by: PSYCHIATRY & NEUROLOGY

## 2021-10-18 PROCEDURE — 20610 DRAIN/INJ JOINT/BURSA W/O US: CPT | Mod: LT | Performed by: PSYCHIATRY & NEUROLOGY

## 2021-10-18 RX ORDER — TRIAMCINOLONE ACETONIDE 40 MG/ML
40 INJECTION, SUSPENSION INTRA-ARTICULAR; INTRAMUSCULAR ONCE
Status: COMPLETED | OUTPATIENT
Start: 2021-10-18 | End: 2021-10-18

## 2021-10-18 RX ORDER — TRIAMCINOLONE ACETONIDE 40 MG/ML
20 INJECTION, SUSPENSION INTRA-ARTICULAR; INTRAMUSCULAR ONCE
Status: COMPLETED | OUTPATIENT
Start: 2021-10-18 | End: 2021-10-18

## 2021-10-18 RX ADMIN — TRIAMCINOLONE ACETONIDE 40 MG: 40 INJECTION, SUSPENSION INTRA-ARTICULAR; INTRAMUSCULAR at 15:37

## 2021-10-18 RX ADMIN — TRIAMCINOLONE ACETONIDE 20 MG: 40 INJECTION, SUSPENSION INTRA-ARTICULAR; INTRAMUSCULAR at 15:37

## 2021-10-18 ASSESSMENT — PAIN SCALES - GENERAL
PAINLEVEL: NO PAIN (0)
PAINLEVEL: MODERATE PAIN (5)

## 2021-10-18 NOTE — NURSING NOTE
Pre-procedure Intake    Have you been fasting? NA    If yes, for how long?     Are you taking a prescribed blood thinner such as coumadin, Plavix, Xarelto?    No     If yes, when did you take your last dose?  If not held inform nurse or , directly    Do you take aspirin?  No    If cervical procedure, have you held aspirin for 6 days?  If not held inform  or  Directly. If it's a cervical procedure     Do you have any allergies to contrast dye, iodine, steroid and/or numbing medications?  NO, if YES, inform  or  directly    Are you currently taking antibiotics or have an active infection?  NO, If YES, inform  or  directly    Have you had a fever/elevated temperature within the past week? NO, if YES, inform  or  directly    Are you currently taking oral steroids? NO, if YES, inform  or  directly    Do you have a ? Yes, If NO, inform  or  directly     Are you pregnant or breastfeeding?  No, if YES, inform  or  directly    Have you received the COVID-19 vaccine? Yes    If yes, was it your 1st, 2nd or only dose needed?     Date of most recent vaccine: 3/10/21    Notify provider and RNs if systolic BP >170, diastolic BP >100, P >100 or O2 sats < 90%, Notify Nurse or  Directly if out of range

## 2021-10-18 NOTE — PROGRESS NOTES
Pre procedure Diagnosis: SI joint dysfunction, left trochanteric bursitis  Post procedure Diagnosis: Same  Procedure performed: left SI joint injection and left trochanteric bursa injection   Anesthesia: none  Complications: none  Operators: Sara Chinchilla MD and Sander Valdez MD    Indications:   Lduy Ramos is a 65 year old female was sent by CARLITOS Ko CNP  for low back and hip pain.  Exam shows point tenderness of the left sacroiliac joint.  Also has left lateral hip point tenderness.  Additionally hip thrust was positive on the left. They have tried conservative treatment including medication management.    Options/alternatives, benefits and risks were discussed with the patient including bleeding, infection, tissue trauma, exposure to radiation, reaction to medications including seizure, nerve injury, weakness, and numbness.  Questions were answered to her satisfaction and she agrees to proceed. Voluntary informed consent was obtained and signed.     Vitals were reviewed: Yes  Allergies were reviewed:  Yes   Medications were reviewed:  Yes   Pre-procedure pain score: 5/10  Procedure:  After getting informed consent, patient was brought into the procedure suite and was placed in a prone position on the procedure table.   A Pause for the Cause was performed.  Patient was prepped and draped in sterile fashion.     After identifying the left SI joint, the C-arm was rotated to a obliquely to obtain the best view of the inferior angle of the joint.  A total of 3 ml of Lidocaine 1%  was used to anesthetize the skin at a skin entry site coaxial with the fluoroscopy beam at this location.  A 22gauge 3.5 inch needle was advanced under intermittent fluoroscopy until it was felt to enter the SI joint.  Pt had significant discomfort despite numbing outside of the joint.    A total of 1ml of Omnipaque-300 was injected, confirming appropriate position, with spread into the intraarticular space, with no  intravascular uptake noted.  9ml was wasted  1.5 ml of 0.5% ropivacaine with 40mg of kenalog was injected.  The needle was flushed with lidocaine and removed.    Without fluoroscopy:  The area over the left trochanter was palpated, and the tender area was identified, corresponding to the area of the trochanteric bursa.  The area was cleaned with Chloroprep.  A 25 G 3.5 inch needle was inserted, aiming towards the trochanter.  When bone was encountered, the needle was slightly drawn.  A solution containing local anesthesic and steroid was injected.  The needle was removed.  Hemostasis was achieved. Bandaids were placed as appropriate.      In total, 2ml of 0.2% ropivacaine, 2ml of 1% lidocaine, and 20mg of kenalog was injected.       Hemostasis was achieved, the area was cleaned, and bandaids were placed when appropriate.  The patient tolerated the procedure well, and was taken to the recovery room.    Images were saved to PACS.    Post-procedure pain score: 0/10  Follow-up includes:   -f/u with referring provider  -if hip pain seems to continue, would consider doing the left intraarticular hip injection- which was already ordered by Dr. Heaton.    Sara Chinchilla MD  [facility]

## 2021-10-18 NOTE — PATIENT INSTRUCTIONS
If this injection isn't helpful, a hip joint injection could be considered.  This was previously ordered by Dr. Heaton, and it looks like it was scheduled and then cancelled.  So that would be the next step I would recommend.        [facility]   Procedure Discharge Instructions    Today you saw:  Dr. Hortensia Chinchilla      You had an:  Left sacroiliac joint injection, left trochanteric bursa injection      Medications used:  Lidocaine   Omnipaque  Ropivicaine   Kenalog              Be cautious when walking. Numbness and/or weakness in the lower extremities may occur for up to 6-8 hours after the procedure due to effect of the local anesthetic    Do not drive for 6 hours. The effect of the local anesthetic could slow your reflexes.     You may resume your regular activities after 24 hours    Avoid strenuous activity for the first 24 hours    You may shower, however avoid swimming, tub baths or hot tubs for 24 hours following your procedure    You may have a mild to moderate increase in pain for several days following the injection.    It may take up to 14 days for the steroid medication to start working although you may feel the effect as early as a few days after the procedure.       You may use ice packs for 10-15 minutes, 3 to 4 times a day at the injection site for comfort    Do not use heat to painful areas for 6 to 8 hours. This will give the local anesthetic time to wear off and prevent you from accidentally burning your skin.     Unless you have been directed to avoid the use of anti-inflammatory medications (NSAIDS), you may use medications such as ibuprofen, Aleve or Tylenol for pain control if needed.     If you were fasting, you may resume your normal diet and medications after the procedure    If you have diabetes, check your blood sugar more frequently than usual as your blood sugar may be higher than normal for 10-14 days following a steroid injection. Contact your doctor  who manages your diabetes if your blood sugar is higher than usual    Possible side effects of steroids that you may experience include flushing, elevated blood pressure, increased appetite, mild headaches and restlessness.  All of these symptoms will get better with time.    If you experience any of the following, call the Pain Clinic during work hours (Mon-Friday 8-4:30 pm) at [phone] or the Provider Line after hours at [phone]:  -Fever over 100 degree F  -Swelling, bleeding, redness, drainage, warmth at the injection site  -Progressive weakness or numbness in your legs   -Loss of bowel or bladder function  -Unusual headache that is not relieved by Tylenol or other pain reliever  -Unusual new onset of pain that is not improving

## 2021-10-18 NOTE — NURSING NOTE
Discharge Information    IV Discontiued Time:  NA    Amount of Fluid Infused:  NA    Discharge Criteria = When patient returns to baseline or as per MD order    Consciousness:  Pt is fully awake    Circulation:  BP +/- 20% of pre-procedure level    Respiration:  Patient is able to breathe deeply    O2 Sat:  Patient is able to maintain O2 Sat >92% on room air    Activity:  Moves 4 extremities on command    Ambulation:  Patient is able to stand and walk or stand and pivot into wheelchair    Dressing:  Clean/dry or No Dressing    Notes:   Discharge instructions and AVS given to patient    Patient meets criteria for discharge?  YES    Admitted to PCU?  No    Responsible adult present to accompany patient home?  Yes    Signature/Title:    Lynsey Leiva RN  RN Care Coordinator  [facility]

## 2021-10-25 DIAGNOSIS — J45.40 MODERATE PERSISTENT ASTHMA, UNCOMPLICATED: ICD-10-CM

## 2021-10-26 NOTE — TELEPHONE ENCOUNTER
"Routing refill request to provider for review/approval because:  Requested Prescriptions   Pending Prescriptions Disp Refills    BREO ELLIPTA 200-25 MCG/INH Inhaler [Pharmacy Med Name: Breo Ellipta Inhalation Aerosol Powder Breath Activated 200-25 MCG/INH] 180 each 0     Sig: Inhale 1 puff by mouth once daily        Inhaled Steroids Protocol Failed - 10/25/2021  9:57 AM        Failed - Asthma control assessment score within normal limits in last 6 months     Please review ACT score.           Passed - Patient is age 12 or older        Passed - Medication is active on med list        Passed - Recent (6 mo) or future (30 days) visit within the authorizing provider's specialty     Patient had office visit in the last 6 months or has a visit in the next 30 days with authorizing provider or within the authorizing provider's specialty.  See \"Patient Info\" tab in inbasket, or \"Choose Columns\" in Meds & Orders section of the refill encounter.                ACT Total Scores 10/12/2020 2/25/2021 8/18/2021   ACT TOTAL SCORE - - -   ASTHMA ER VISITS - - -   ASTHMA HOSPITALIZATIONS - - -   ACT TOTAL SCORE (Goal Greater than or Equal to 20) 17 20 16   In the past 12 months, how many times did you visit the emergency room for your asthma without being admitted to the hospital? 0 0 0   In the past 12 months, how many times were you hospitalized overnight because of your asthma? 0 0 0         Tegan TURNER, RN        "

## 2021-11-11 DIAGNOSIS — S32.028D OTHER CLOSED FRACTURE OF SECOND LUMBAR VERTEBRA WITH ROUTINE HEALING, SUBSEQUENT ENCOUNTER: ICD-10-CM

## 2021-11-11 RX ORDER — OXYCODONE AND ACETAMINOPHEN 5; 325 MG/1; MG/1
TABLET ORAL
Qty: 45 TABLET | Refills: 0 | Status: SHIPPED | OUTPATIENT
Start: 2021-11-11 | End: 2021-12-15

## 2021-11-16 ENCOUNTER — OFFICE VISIT (OUTPATIENT)
Dept: PALLIATIVE MEDICINE | Facility: CLINIC | Age: 66
End: 2021-11-16
Payer: COMMERCIAL

## 2021-11-16 ENCOUNTER — LAB (OUTPATIENT)
Dept: LAB | Facility: CLINIC | Age: 66
End: 2021-11-16
Payer: COMMERCIAL

## 2021-11-16 VITALS — HEART RATE: 89 BPM | DIASTOLIC BLOOD PRESSURE: 101 MMHG | SYSTOLIC BLOOD PRESSURE: 177 MMHG

## 2021-11-16 DIAGNOSIS — Z79.891 ENCOUNTER FOR LONG-TERM USE OF OPIATE ANALGESIC: ICD-10-CM

## 2021-11-16 DIAGNOSIS — F11.90 CHRONIC, CONTINUOUS USE OF OPIOIDS: ICD-10-CM

## 2021-11-16 DIAGNOSIS — M53.3 CHRONIC LEFT SI JOINT PAIN: ICD-10-CM

## 2021-11-16 DIAGNOSIS — M70.62 GREATER TROCHANTERIC BURSITIS OF LEFT HIP: ICD-10-CM

## 2021-11-16 DIAGNOSIS — M54.50 CHRONIC LEFT-SIDED LOW BACK PAIN WITHOUT SCIATICA: Primary | ICD-10-CM

## 2021-11-16 DIAGNOSIS — G89.29 CHRONIC LEFT-SIDED LOW BACK PAIN WITHOUT SCIATICA: Primary | ICD-10-CM

## 2021-11-16 DIAGNOSIS — G89.29 CHRONIC LEFT SI JOINT PAIN: ICD-10-CM

## 2021-11-16 DIAGNOSIS — G89.4 CHRONIC PAIN SYNDROME: ICD-10-CM

## 2021-11-16 LAB
CANNABINOIDS UR QL SCN: ABNORMAL
CREAT UR-MCNC: 56 MG/DL

## 2021-11-16 PROCEDURE — 80307 DRUG TEST PRSMV CHEM ANLYZR: CPT

## 2021-11-16 PROCEDURE — 80307 DRUG TEST PRSMV CHEM ANLYZR: CPT | Mod: 59

## 2021-11-16 PROCEDURE — 99214 OFFICE O/P EST MOD 30 MIN: CPT | Performed by: NURSE PRACTITIONER

## 2021-11-16 RX ORDER — BUPRENORPHINE 5 UG/H
1 PATCH TRANSDERMAL
Qty: 4 PATCH | Refills: 0 | Status: SHIPPED | OUTPATIENT
Start: 2021-11-16 | End: 2021-12-03

## 2021-11-16 ASSESSMENT — PAIN SCALES - GENERAL: PAINLEVEL: MODERATE PAIN (5)

## 2021-11-16 NOTE — LETTER
Opioid / Opioid Plus Controlled Substance Agreement    This is an agreement between you and your provider about the safe and appropriate use of controlled substance/opioids prescribed by your care team. Controlled substances are medicines that can cause physical and mental dependence (abuse).    There are strict laws about having and using these medicines. We here at [facility] are committing to working with you in your efforts to get better. To support you in this work, we ll help you schedule regular office appointments for medicine refills. If we must cancel or change your appointment for any reason, we ll make sure you have enough medicine to last until your next appointment.     As a Provider, I will:    Listen carefully to your concerns and treat you with respect.     Recommend a treatment plan that I believe is in your best interest. This plan may involve therapies other than opioid pain medication.     Talk with you often about the possible benefits, and the risk of harm of any medicine that we prescribe for you.     Provide a plan on how to taper (discontinue or go off) using this medicine if the decision is made to stop its use.    As a Patient, I understand that opioid(s):     Are a controlled substance prescribed by my care team to help me function or work and manage my condition(s).     Are strong medicines and can cause serious side effects such as:    Drowsiness, which can seriously affect my driving ability    A lower breathing rate, enough to cause death    Harm to my thinking ability     Depression     Abuse of and addiction to this medicine    Need to be taken exactly as prescribed. Combining opioids with certain medicines or chemicals (such as illegal drugs, sedatives, sleeping pills, and benzodiazepines) can be dangerous or even fatal. If I stop opioids suddenly, I may have severe withdrawal symptoms.    Do not work for all types of pain nor for all patients. If they re not helpful, I may  be asked to stop them.        The risks, benefits and side effects of these medicine(s) were explained to me. I agree that:  1. I will take part in other treatments as advised by my care team. This may be psychiatry or counseling, physical therapy, behavioral therapy, group treatment or a referral to a specialist.     2. I will keep all my appointments. I understand that this is part of the monitoring of opioids. My care team may require an office visit for EVERY opioid/controlled substance refill. If I miss appointments or don t follow instructions, my care team may stop my medicine.    3. I will take my medicines as prescribed. I will not change the dose or schedule unless my care team tells me to. There will be no refills if I run out early.     4. I may be asked to come to the clinic and complete a urine drug test or complete a pill count at any time. If I don t give a urine sample or participate in a pill count, the care team may stop my medicine.    5. I will only receive prescriptions from this clinic for chronic pain. If I am treated by another provider for acute pain issues, I will tell them that I am taking opioid pain medication for chronic pain and that I have a treatment agreement with this provider. I will inform my [facility] care team within one business day if I am given a prescription for any pain medication by another healthcare provider. My [facility] care team can contact other providers and pharmacists about my use of any medicines.    6. It is up to me to make sure that I don t run out of my medicines on weekends or holidays. If my care team is willing to refill my opioid prescription without a visit, I must request refills only during office hours. Refills may take up to 3 business days to process. I will use one pharmacy to fill all my opioid and other controlled substance prescriptions. I will notify the clinic about any changes to my insurance or medication  availability.    7. I am responsible for my prescriptions. If the medicine/prescription is lost, stolen or destroyed, it will not be replaced. I also agree not to share controlled substance medicines with anyone.    8. I am aware I should not use any illegal or recreational drugs. I agree not to drink alcohol unless my care team says I can.       9. If I enroll in the [place] [facility] program, I will tell my care team prior to my next refill.     10. I will tell my care team right away if I become pregnant, have a new medical problem treated outside of my regular clinic, or have a change in my medications.    11. I understand that this medicine can affect my thinking, judgment and reaction time. Alcohol and drugs affect the brain and body, which can affect the safety of my driving. Being under the influence of alcohol or drugs can affect my decision-making, behaviors, personal safety, and the safety of others. Driving while impaired (DWI) can occur if a person is driving, operating, or in physical control of a car, motorcycle, boat, snowmobile, ATV, motorbike, off-road vehicle, or any other motor vehicle ([place] Statute 169A.20). I understand the risk if I choose to drive or operate any vehicle or machinery.    I understand that if I do not follow any of the conditions above, my prescriptions or treatment may be stopped or changed.          Opioids  What You Need to Know    What are opioids?   Opioids are pain medicines that must be prescribed by a doctor. They are also known as narcotics.     Examples are:   1. morphine (MS Contin, Ajnny)  2. oxycodone (Oxycontin)  3. oxycodone and acetaminophen (Percocet)  4. hydrocodone and acetaminophen (Vicodin, Norco)   5. fentanyl patch (Duragesic)   6. hydromorphone (Dilaudid)   7. methadone  8. codeine (Tylenol #3)     What do opioids do well?   Opioids are best for severe short-term pain such as after a surgery or injury. They may work well for cancer pain. They may  help some people with long-lasting (chronic) pain.     What do opioids NOT do well?   Opioids never get rid of pain entirely, and they don t work well for most patients with chronic pain. Opioids don t reduce swelling, one of the causes of pain.                                    Other ways to manage chronic pain and improve function include:       Treat the health problem that may be causing pain    Anti-inflammation medicines, which reduce swelling and tenderness, such as ibuprofen (Advil, Motrin) or naproxen (Aleve)    Acetaminophen (Tylenol)    Antidepressants and anti-seizure medicines, especially for nerve pain    Topical treatments such as patches or creams    Injections or nerve blocks    Chiropractic or osteopathic treatment    Acupuncture, massage, deep breathing, meditation, visual imagery, aromatherapy    Use heat or ice at the pain site    Physical therapy     Exercise    Stop smoking    Take part in therapy       Risks and side effects     Talk to your doctor before you start or decide to keep taking opioids. Possible side effects include:      Lowering your breathing rate enough to cause death    Overdose, including death, especially if taking higher than prescribed doses    Worse depression symptoms; less pleasure in things you usually enjoy    Feeling tired or sluggish    Slower thoughts or cloudy thinking    Being more sensitive to pain over time; pain is harder to control    Trouble sleeping or restless sleep    Changes in hormone levels (for example, less testosterone)    Changes in sex drive or ability to have sex    Constipation    Unsafe driving    Itching and sweating    Dizziness    Nausea, throwing up and dry mouth    What else should I know about opioids?    Opioids may lead to dependence, tolerance, or addiction.      Dependence means that if you stop or reduce the medicine too quickly, you will have withdrawal symptoms. These include loose poop (diarrhea), jitters, flu-like symptoms,  nervousness and tremors. Dependence is not the same as addiction.                       Tolerance means needing higher doses over time to get the same effect. This may increase the chance of serious side effects.      Addiction is when people improperly use a substance that harms their body, their mind or their relations with others. Use of opiates can cause a relapse of addiction if you have a history of drug or alcohol abuse.      People who have used opioids for a long time may have a lower quality of life, worse depression, higher levels of pain and more visits to doctors.    You can overdose on opioids. Take these steps to lower your risk of overdose:    1. Recognize the signs:  Signs of overdose include decrease or loss of consciousness (blackout), slowed breathing, trouble waking up and blue lips. If someone is worried about overdose, they should call 911.    2. Talk to your doctor about Narcan (naloxone).   If you are at risk for overdose, you may be given a prescription for Narcan. This medicine very quickly reverses the effects of opioids.   If you overdose, a friend or family member can give you Narcan while waiting for the ambulance. They need to know the signs of overdose and how to give Narcan.     3. Don't use alcohol or street drugs.   Taking them with opioids can cause death.    4. Do not take any of these medicines unless your doctor says it s OK. Taking these with opioids can cause death:    Benzodiazepines, such as lorazepam (Ativan), alprazolam (Xanax) or diazepam (Valium)    Muscle relaxers, such as cyclobenzaprine (Flexeril)    Sleeping pills like zolpidem (Ambien)     Other opioids      How to keep you and other people safe while taking opioids:    1. Never share your opioids with others.  Opioid medicines are regulated by the Drug Enforcement Agency ([place]). Selling or sharing medications is a criminal act.    2. Be sure to store opioids in a secure place, locked up if possible. Young children  can easily swallow them and overdose.    3. When you are traveling with your medicines, keep them in the original bottles. If you use a pill box, be sure you also carry a copy of your medicine list from your clinic or pharmacy.    4. Safe disposal of opioids    Most pharmacies have places to get rid of medicine, called disposal kiosks. Medicine disposal options are also available in every [facility]. Search your county and  medication disposal  to find more options. You can find more details at:  [email or web address]     I agree that my provider, clinic care team, and pharmacy may work with any city, state or federal law enforcement agency that investigates the misuse, sale, or other diversion of my controlled medicine. I will allow my provider to discuss my care with, or share a copy of, this agreement with any other treating provider, pharmacy or emergency room where I receive care.    I have read this agreement and have asked questions about anything I did not understand.    _______________________________________________________  Patient Signature - Ludy Ramos _____________________                   Date     _______________________________________________________  Provider Signature - CARLITOS Lange CNP   _____________________                   Date     _______________________________________________________  Witness Signature (required if provider not present while patient signing)   _____________________                   Date

## 2021-11-16 NOTE — LETTER
Opioid / Opioid Plus Controlled Substance Agreement    This is an agreement between you and your provider about the safe and appropriate use of controlled substance/opioids prescribed by your care team. Controlled substances are medicines that can cause physical and mental dependence (abuse).    There are strict laws about having and using these medicines. We here at [facility] are committing to working with you in your efforts to get better. To support you in this work, we ll help you schedule regular office appointments for medicine refills. If we must cancel or change your appointment for any reason, we ll make sure you have enough medicine to last until your next appointment.     As a Provider, I will:    Listen carefully to your concerns and treat you with respect.     Recommend a treatment plan that I believe is in your best interest. This plan may involve therapies other than opioid pain medication.     Talk with you often about the possible benefits, and the risk of harm of any medicine that we prescribe for you.     Provide a plan on how to taper (discontinue or go off) using this medicine if the decision is made to stop its use.    As a Patient, I understand that opioid(s):     Are a controlled substance prescribed by my care team to help me function or work and manage my condition(s).     Are strong medicines and can cause serious side effects such as:    Drowsiness, which can seriously affect my driving ability    A lower breathing rate, enough to cause death    Harm to my thinking ability     Depression     Abuse of and addiction to this medicine    Need to be taken exactly as prescribed. Combining opioids with certain medicines or chemicals (such as illegal drugs, sedatives, sleeping pills, and benzodiazepines) can be dangerous or even fatal. If I stop opioids suddenly, I may have severe withdrawal symptoms.    Do not work for all types of pain nor for all patients. If they re not helpful, I may  be asked to stop them.        The risks, benefits and side effects of these medicine(s) were explained to me. I agree that:  1. I will take part in other treatments as advised by my care team. This may be psychiatry or counseling, physical therapy, behavioral therapy, group treatment or a referral to a specialist.     2. I will keep all my appointments. I understand that this is part of the monitoring of opioids. My care team may require an office visit for EVERY opioid/controlled substance refill. If I miss appointments or don t follow instructions, my care team may stop my medicine.    3. I will take my medicines as prescribed. I will not change the dose or schedule unless my care team tells me to. There will be no refills if I run out early.     4. I may be asked to come to the clinic and complete a urine drug test or complete a pill count at any time. If I don t give a urine sample or participate in a pill count, the care team may stop my medicine.    5. I will only receive prescriptions from this clinic for chronic pain. If I am treated by another provider for acute pain issues, I will tell them that I am taking opioid pain medication for chronic pain and that I have a treatment agreement with this provider. I will inform my [facility] care team within one business day if I am given a prescription for any pain medication by another healthcare provider. My [facility] care team can contact other providers and pharmacists about my use of any medicines.    6. It is up to me to make sure that I don t run out of my medicines on weekends or holidays. If my care team is willing to refill my opioid prescription without a visit, I must request refills only during office hours. Refills may take up to 3 business days to process. I will use one pharmacy to fill all my opioid and other controlled substance prescriptions. I will notify the clinic about any changes to my insurance or medication  availability.    7. I am responsible for my prescriptions. If the medicine/prescription is lost, stolen or destroyed, it will not be replaced. I also agree not to share controlled substance medicines with anyone.    8. I am aware I should not use any illegal or recreational drugs. I agree not to drink alcohol unless my care team says I can.       9. If I enroll in the [place] Medical Cannabis program, I will tell my care team prior to my next refill.     10. I will tell my care team right away if I become pregnant, have a new medical problem treated outside of my regular clinic, or have a change in my medications.    11. I understand that this medicine can affect my thinking, judgment and reaction time. Alcohol and drugs affect the brain and body, which can affect the safety of my driving. Being under the influence of alcohol or drugs can affect my decision-making, behaviors, personal safety, and the safety of others. Driving while impaired (DWI) can occur if a person is driving, operating, or in physical control of a car, motorcycle, boat, snowmobile, ATV, motorbike, off-road vehicle, or any other motor vehicle ([place] Statute 169A.20). I understand the risk if I choose to drive or operate any vehicle or machinery.    I understand that if I do not follow any of the conditions above, my prescriptions or treatment may be stopped or changed.          Opioids  What You Need to Know    What are opioids?   Opioids are pain medicines that must be prescribed by a doctor. They are also known as narcotics.     Examples are:   1. morphine (MS Contin, Janny)  2. oxycodone (Oxycontin)  3. oxycodone and acetaminophen (Percocet)  4. hydrocodone and acetaminophen (Vicodin, Norco)   5. fentanyl patch (Duragesic)   6. hydromorphone (Dilaudid)   7. methadone  8. codeine (Tylenol #3)     What do opioids do well?   Opioids are best for severe short-term pain such as after a surgery or injury. They may work well for cancer pain. They may  help some people with long-lasting (chronic) pain.     What do opioids NOT do well?   Opioids never get rid of pain entirely, and they don t work well for most patients with chronic pain. Opioids don t reduce swelling, one of the causes of pain.                                    Other ways to manage chronic pain and improve function include:       Treat the health problem that may be causing pain    Anti-inflammation medicines, which reduce swelling and tenderness, such as ibuprofen (Advil, Motrin) or naproxen (Aleve)    Acetaminophen (Tylenol)    Antidepressants and anti-seizure medicines, especially for nerve pain    Topical treatments such as patches or creams    Injections or nerve blocks    Chiropractic or osteopathic treatment    Acupuncture, massage, deep breathing, meditation, visual imagery, aromatherapy    Use heat or ice at the pain site    Physical therapy     Exercise    Stop smoking    Take part in therapy       Risks and side effects     Talk to your doctor before you start or decide to keep taking opioids. Possible side effects include:      Lowering your breathing rate enough to cause death    Overdose, including death, especially if taking higher than prescribed doses    Worse depression symptoms; less pleasure in things you usually enjoy    Feeling tired or sluggish    Slower thoughts or cloudy thinking    Being more sensitive to pain over time; pain is harder to control    Trouble sleeping or restless sleep    Changes in hormone levels (for example, less testosterone)    Changes in sex drive or ability to have sex    Constipation    Unsafe driving    Itching and sweating    Dizziness    Nausea, throwing up and dry mouth    What else should I know about opioids?    Opioids may lead to dependence, tolerance, or addiction.      Dependence means that if you stop or reduce the medicine too quickly, you will have withdrawal symptoms. These include loose poop (diarrhea), jitters, flu-like symptoms,  nervousness and tremors. Dependence is not the same as addiction.                       Tolerance means needing higher doses over time to get the same effect. This may increase the chance of serious side effects.      Addiction is when people improperly use a substance that harms their body, their mind or their relations with others. Use of opiates can cause a relapse of addiction if you have a history of drug or alcohol abuse.      People who have used opioids for a long time may have a lower quality of life, worse depression, higher levels of pain and more visits to doctors.    You can overdose on opioids. Take these steps to lower your risk of overdose:    1. Recognize the signs:  Signs of overdose include decrease or loss of consciousness (blackout), slowed breathing, trouble waking up and blue lips. If someone is worried about overdose, they should call 911.    2. Talk to your doctor about Narcan (naloxone).   If you are at risk for overdose, you may be given a prescription for Narcan. This medicine very quickly reverses the effects of opioids.   If you overdose, a friend or family member can give you Narcan while waiting for the ambulance. They need to know the signs of overdose and how to give Narcan.     3. Don't use alcohol or street drugs.   Taking them with opioids can cause death.    4. Do not take any of these medicines unless your doctor says it s OK. Taking these with opioids can cause death:    Benzodiazepines, such as lorazepam (Ativan), alprazolam (Xanax) or diazepam (Valium)    Muscle relaxers, such as cyclobenzaprine (Flexeril)    Sleeping pills like zolpidem (Ambien)     Other opioids      How to keep you and other people safe while taking opioids:    1. Never share your opioids with others.  Opioid medicines are regulated by the Drug Enforcement Agency ([place]). Selling or sharing medications is a criminal act.    2. Be sure to store opioids in a secure place, locked up if possible. Young children  can easily swallow them and overdose.    3. When you are traveling with your medicines, keep them in the original bottles. If you use a pill box, be sure you also carry a copy of your medicine list from your clinic or pharmacy.    4. Safe disposal of opioids    Most pharmacies have places to get rid of medicine, called disposal kiosks. Medicine disposal options are also available in every [facility]. Search your county and  medication disposal  to find more options. You can find more details at:  [email or web address]-[email or web address]-medications     I agree that my provider, clinic care team, and pharmacy may work with any city, state or federal law enforcement agency that investigates the misuse, sale, or other diversion of my controlled medicine. I will allow my provider to discuss my care with, or share a copy of, this agreement with any other treating provider, pharmacy or emergency room where I receive care.    I have read this agreement and have asked questions about anything I did not understand.    _______________________________________________________  Patient Signature - Ludy Ramos _____________________                   Date     _______________________________________________________  Provider Signature - CARLITOS Lange CNP   _____________________                   Date     _______________________________________________________  Witness Signature (required if provider not present while patient signing)   _____________________                   Date

## 2021-11-16 NOTE — PATIENT INSTRUCTIONS
Plan:   1. Physical Therapy: not at present  2. I am in support of you joining [employer] at the gym  3. Clinical Health Psychologist to address issues of relaxation, behavioral change, coping style, and other factors important to improvement: none  4. Diagnostic Studies: none  5. Medication Management:   1. I am OK with you using oxycodone/acetminophen 1-2 tabs per day as you are.I will take over Percocet in December  2. START Butrans 5mcg/hr transdermal patch, change every 7 days.   6. Further procedures recommended: none   7. Acupuncture: none  8. Urine toxicology screen today: yes, last used percocet yesterday and marijuana yesterday  9. Sign CSA today  10. Recommendations/follow-up for PCP:  See above  11. Release of information: none  12. Follow up: 3-4 weeks of starting Butrans. Please call [phone] to make your follow-up appointment with me.  13. .you should see your Primary Care Provider to re-check your blood pressure       Administration of Butrans      Butrans is for transdermal use (on intact skin) only.     Each Butrans patch is intended to be worn for 7 days    Do not to use Butrans if the pouch seal is broken or the patch is cut, damaged, or changed in any way    Do not to cut the Butrans patch    Apply immediately after removal from the individually sealed pouch    Apply Butrans to the upper outer arm, upper chest, upper back or the side of the chest. These 4 sites (each present on both sides of the body) provide 8 possible application sites. Rotate Butrans among the 8 described skin sites. After Butrans removal, wait a minimum of 21 days before reapplying to the same skin site            For the use of 2 patches, remove your current patch, and apply the 2 new patches adjacent to one another at a different application site    Apply Butrans to a hairless or nearly hairless skin site.     If none are available, the hair at the site should be clipped, not shaven.     Do not apply Butrans to  irritated skin.     If the application site must be cleaned, clean the site with water only. Do not use soaps, alcohol, oils, lotions, or abrasive devices.     Allow the skin to dry before applying Butrans    It is OK to bathe or shower with the patch on. Lightly pat it dry.     If problems with adhesion of Butrans occur, the edges may be taped with first aid tape.     If problems with lack of adhesion continue, the patch may be covered with waterproof or semipermeable adhesive dressings (Tegaderm) suitable for 7 days of wear.     If Butrans falls off during the 7-day dosing interval, dispose of the transdermal system properly and place a new Butrans patch on at a different skin site.     Dispose of used patches by folding the adhesive side of the patch to itself, then flushing the patch down the toilet immediately upon removal.     Unused patches should be removed from their pouches, the protective liners removed, the patches folded so that the adhesive side of the patch adheres to itself, and immediately flushed down the toilet    Dispose of any patches remaining from a prescription as soon as they are no longer needed    Butrans is supplied in cartons containing 4 individually packaged systems and   a pouch containing 4 Patch-Disposal Units         ----------------------------------------------------------------  [facility] Number:  [phone]     Call with any questions about your care and for scheduling assistance.     Calls are returned Monday through Friday between 8 AM and 4:30 PM. We usually get back to you within 2 business days depending on the issue/request.    If we are prescribing your medications:    For opioid medication refills, call the clinic or send a GetSnippy message 7 days in advance.  Please include:    Name of requested medication    Name of the pharmacy.    For non-opioid medications, call your pharmacy directly to request a refill. Please allow 3-4 days to be processed.     Per [place] State  Law:    All controlled substance prescriptions must be filled within 30 days of being written.      For those controlled substances allowing refills, pickup must occur within 30 days of last fill.      We believe regular attendance is key to your success in our program!      Any time you are unable to keep your appointment we ask that you call us at least 24 hours in advance to cancel.This will allow us to offer the appointment time to another patient.     Multiple missed appointments may lead to dismissal from the clinic.

## 2021-11-17 ENCOUNTER — TELEPHONE (OUTPATIENT)
Dept: PALLIATIVE MEDICINE | Facility: CLINIC | Age: 66
End: 2021-11-17
Payer: COMMERCIAL

## 2021-11-17 DIAGNOSIS — F11.90 CHRONIC, CONTINUOUS USE OF OPIOIDS: ICD-10-CM

## 2021-11-17 DIAGNOSIS — G89.29 CHRONIC LEFT SI JOINT PAIN: ICD-10-CM

## 2021-11-17 DIAGNOSIS — M70.62 GREATER TROCHANTERIC BURSITIS OF LEFT HIP: ICD-10-CM

## 2021-11-17 DIAGNOSIS — M54.50 CHRONIC LEFT-SIDED LOW BACK PAIN WITHOUT SCIATICA: ICD-10-CM

## 2021-11-17 DIAGNOSIS — M53.3 CHRONIC LEFT SI JOINT PAIN: ICD-10-CM

## 2021-11-17 DIAGNOSIS — G89.29 CHRONIC LEFT-SIDED LOW BACK PAIN WITHOUT SCIATICA: ICD-10-CM

## 2021-11-17 DIAGNOSIS — G89.4 CHRONIC PAIN SYNDROME: ICD-10-CM

## 2021-11-17 NOTE — LETTER
December 3, 2021      To Whom It May Concern:    I am writing to appeal the decision not to cover Butrans transdermal patches for Ludy Ramos  1955. The medication that was recommended as a substitute for Butrans transdermal patch was Tramadol ER.   Ludy Ramos  1955 has been under my care for chronic pain since 9/15/2021. She struggles with chronic pain from Chronic left SI joint pain, SI joint dysfunction of left side, Greater trochanteric bursitis of the left hip, Chronic left sided low back pain without sciatica, Closed fracture of first lumbar vertebra, unspecified fracture morphology/sequela, and Compression fracture of T 12 vertebra, sequela.     Her current medication regimen is   Current pain relevant medications:   -oxycodone/acetaminophen 5/325 mg take 1 tab Q 6 hours PRN pain don't take within 6 hours of alprazolam (helpful, use once per day every day)     Other pertinent medications:  -alprazolam 0.25 mg take 1 tab TID PRN anxiety, take 30-60 minutes prior to flight (uses rarely for flying)  -Celexa 20 mg every day (helpful)  -trazodone 100 mg take 2 tabs at bedtime (helpful for sleep)    She has tried and failed gabapentin which caused her to have more pain and this subsided when she stopped taking the gabapentin.     The insurer has recommended Tramadol ER as an alternative to Butrans transdermal patch. Tramadol ER is not a good fit for Ludy Ramos as she is also on citalopram and trazodone as well as Percocet. There are already risks for serotonin syndrome by using trazodone, Percocet and citalopram together. It is not a safe practice to introduce another medication that further increases Ludy Ramos's risk for serotonin syndrome. The low dose Percocet that she is on now does not adequately manage her pain when she is standing and walking as her pain can increase from a 0/10 while sitting up to a 10/10 while walking. Therefore, addition of a long-acting opiate  medication seems prudent for improved pain management. Butrans is an excellent agent as it is a mu agonist/antagonist and as such, it works on multiple pain receptors including mu-receptors, Kappa and Delta receptors. It often gives excellent around the clock pain relief and only requires 1 patch for 7 days coverage. There is less constipation, less sedation, less effects on the hormonal axis and, most importantly, it has markedly less respiratory depression than other opiate analgesics and there is a ceiling to the respiratory depression, unlike other opiate medication. These qualities makes it a much safer choice, especially in elderly patients.   Please cover Butrans transdermal patches for Ludy Ramos JARRED 1955 and allow me to titrate this medication as required to achieve improved pain control and better quality of life for this pleasant 66 year old woman who desires to be active in her later years.       Respectfully,      Raisa URBINA RN CNP, FNP  [facility]  [facility]

## 2021-11-17 NOTE — TELEPHONE ENCOUNTER
Prior Authorization Specialty Medication Request    Medication/Dose: buprenorphine (BUTRANS) 5 MCG/HR WK patch  ICD code (if different than what is on RX):   Chronic left-sided low back pain without sciatica [M54.50, G89.29]  - Primary       Chronic left SI joint pain [M53.3, G89.29]       Greater trochanteric bursitis of left hip [M70.62]       Chronic pain syndrome [G89.4]       Chronic, continuous use of opioids [F11.90]         Previously Tried and Failed:     Important Lab Values:  Rationale:      Member ID: [id number]     Payor: [employer] Ph: [phone]     Benefit plan: [facility] MEDICARE ADVANTAGE Ph: [phone]

## 2021-11-17 NOTE — TELEPHONE ENCOUNTER
Central Prior Authorization Team   Phone: [phone]      PA Initiation    Medication: buprenorphine (BUTRANS) 5 MCG/HR WK patch - INITIATED  Insurance Company: [employer] [place] - Phone [phone] Fax [phone]  Pharmacy Filling the Rx: [id number] - [facility] [place], [place] - [address]  Filling Pharmacy Phone: [phone]  Filling Pharmacy Fax: [phone]  Start Date: 11/17/2021

## 2021-11-18 LAB
OXYCODONE UR CFM-MCNC: 131 NG/ML
OXYCODONE/CREAT UR: 234 NG/MG {CREAT}
OXYMORPHONE UR CFM-MCNC: 85 NG/ML
OXYMORPHONE/CREAT UR: 152 NG/MG {CREAT}

## 2021-11-19 NOTE — TELEPHONE ENCOUNTER
PRIOR AUTHORIZATION DENIED     Medication: buprenorphine (BUTRANS) 5 MCG/HR WK patch - DENIED    Routed to Raisa

## 2021-11-19 NOTE — TELEPHONE ENCOUNTER
Central Prior Authorization Team   Phone: [phone]      PRIOR AUTHORIZATION DENIED    Medication: buprenorphine (BUTRANS) 5 MCG/HR WK patch - DENIED    Denial Date: 11/17/2021    Denial Rational:       Appeal Information:

## 2021-12-01 ENCOUNTER — MYC MEDICAL ADVICE (OUTPATIENT)
Dept: PALLIATIVE MEDICINE | Facility: CLINIC | Age: 66
End: 2021-12-01
Payer: COMMERCIAL

## 2021-12-01 DIAGNOSIS — G89.29 CHRONIC LEFT SI JOINT PAIN: ICD-10-CM

## 2021-12-01 DIAGNOSIS — G89.4 CHRONIC PAIN SYNDROME: ICD-10-CM

## 2021-12-01 DIAGNOSIS — M70.62 GREATER TROCHANTERIC BURSITIS OF LEFT HIP: ICD-10-CM

## 2021-12-01 DIAGNOSIS — M53.3 CHRONIC LEFT SI JOINT PAIN: ICD-10-CM

## 2021-12-01 DIAGNOSIS — G89.29 CHRONIC LEFT-SIDED LOW BACK PAIN WITHOUT SCIATICA: ICD-10-CM

## 2021-12-01 DIAGNOSIS — M54.50 CHRONIC LEFT-SIDED LOW BACK PAIN WITHOUT SCIATICA: ICD-10-CM

## 2021-12-01 DIAGNOSIS — F11.90 CHRONIC, CONTINUOUS USE OF OPIOIDS: ICD-10-CM

## 2021-12-01 NOTE — TELEPHONE ENCOUNTER
From: Ludy Ramos      Created: 12/1/2021 12:27 PM        *-*-*This message has not been handled.*-*-*    As you suspected, my insurance denied the pain patch that you had suggested. Do we have another plan of action? Thanks Ludy        Will forward to Raisa Peña.      11/17/21 there is a TE that says the PA for patch was denied    Gilbert Griffin, JAIME  Patient Care Supervisor   [facility] [place]

## 2021-12-03 NOTE — TELEPHONE ENCOUNTER
Central Prior Authorization Team   Phone: [phone]      Medication Appeal Initiation    We have initiated an appeal for the requested medication:  Medication: buprenorphine (BUTRANS) 5 MCG/HR WK patch - APPEAL INITIATED  Appeal Start Date:  12/3/2021  Insurance Company: [employer] [place] - Phone [phone] Fax [phone]  Comments:  Appeal initiated with letter of medical necessity.    Manually faxed to insurance @ [phone].

## 2021-12-03 NOTE — TELEPHONE ENCOUNTER
I have written an appeal letter re: the denial of Butrans. Please send to the insurance company.     Raisa URBINA RN CNP, FNP  [facility]

## 2021-12-07 NOTE — TELEPHONE ENCOUNTER
[facility] called and stated prescription is approved and they will fax a approval letter      Adrienne Flynn    [facility]

## 2021-12-08 ENCOUNTER — MYC MEDICAL ADVICE (OUTPATIENT)
Dept: PALLIATIVE MEDICINE | Facility: CLINIC | Age: 66
End: 2021-12-08
Payer: COMMERCIAL

## 2021-12-08 RX ORDER — BUPRENORPHINE 5 UG/H
1 PATCH TRANSDERMAL
Qty: 4 PATCH | Refills: 0 | COMMUNITY
Start: 2021-11-16 | End: 2022-01-14 | Stop reason: SINTOL

## 2021-12-08 RX ORDER — BUPRENORPHINE 5 UG/H
1 PATCH TRANSDERMAL
Qty: 4 PATCH | Refills: 0 | Status: SHIPPED | OUTPATIENT
Start: 2021-12-08 | End: 2022-01-14 | Stop reason: SINTOL

## 2021-12-08 NOTE — TELEPHONE ENCOUNTER
[employer] message sent with Rx approval from provider.  Cyril  Pain Clinic Management Team

## 2021-12-08 NOTE — TELEPHONE ENCOUNTER
Meek sent to pt:  From: Rubina Montgomery RN      Created: 12/8/2021 1:52 PM      Raisa Howell APRN CNP appealed the butrans patch denial and this medication has now been APPROVED.    I have sent this on to Raisa thomas/chavez a new script will be needed.  We will let you know when it is signed and sent on to your pharmacy.          The original script was dc'd so a new one is needed.    A script was prepped for CARLITOS Kennedy CNP to review and sign.    JAIME Cespedes-BSN  [facility]

## 2021-12-08 NOTE — TELEPHONE ENCOUNTER
Signed Prescriptions:                        Disp   Refills    buprenorphine (BUTRANS) 5 MCG/HR WK patch  4 patch0        Sig: Place 1 patch onto the skin every 7 days Fill/begin           12/8/2021. Change every 7 days. 28 day script.  Authorizing Provider: RAISA ARRIAGA    Reviewed [place]  December 8, 2021- no concerning fills.    Raisa URBINA, RN CNP, FNP  [facility]  [facility]

## 2021-12-14 ENCOUNTER — MYC MEDICAL ADVICE (OUTPATIENT)
Dept: PALLIATIVE MEDICINE | Facility: CLINIC | Age: 66
End: 2021-12-14
Payer: COMMERCIAL

## 2021-12-14 DIAGNOSIS — S32.028D OTHER CLOSED FRACTURE OF SECOND LUMBAR VERTEBRA WITH ROUTINE HEALING, SUBSEQUENT ENCOUNTER: ICD-10-CM

## 2021-12-14 NOTE — TELEPHONE ENCOUNTER
Routed to the nursing pool and to the MA pool to process refill(s).    Rubina Montgomery, RN-BSN  [facility]

## 2021-12-14 NOTE — TELEPHONE ENCOUNTER
Received call from patient requesting refill(s) of oxyCODONE-acetaminophen (PERCOCET) 5-325 MG tablet    Last dispensed from pharmacy on 11/12/21    Patient's last office/virtual visit by prescribing provider on 11/16/21  Next office/virtual appointment scheduled for : none    Last urine drug screen date 11/16/21  Current opioid agreement on file (completed within the last year) Yes Date of opioid agreement: 11/16/21    E-prescribe to pharmacy-[id number] - [place], [place] - [address]     Will route to nursing Truchas for review and preparation of prescription(s).

## 2021-12-15 RX ORDER — OXYCODONE AND ACETAMINOPHEN 5; 325 MG/1; MG/1
1 TABLET ORAL EVERY 6 HOURS PRN
Qty: 45 TABLET | Refills: 0 | Status: SHIPPED | OUTPATIENT
Start: 2021-12-15 | End: 2022-01-14

## 2021-12-15 NOTE — TELEPHONE ENCOUNTER
Signed Prescriptions:                        Disp   Refills    oxyCODONE-acetaminophen (PERCOCET) 5-325 M*45 tab*0        Sig: Take 1 tablet by mouth every 6 hours as needed for           severe pain Fill/Start 12/15/21  Authorizing Provider: RAISA ARRIAGA    Reviewed [facility] December 15, 2021- no concerning fills.    Raisa URBINA, RN CNP, FNP  [facility]  [facility]

## 2021-12-15 NOTE — TELEPHONE ENCOUNTER
[email or web address] message sent with Rx approval from provider.  Cyril  Pain Clinic Management Team

## 2021-12-15 NOTE — TELEPHONE ENCOUNTER
Medication refill information reviewed.     Due date for oxyCODONE-acetaminophen (PERCOCET) 5-325 MG tablet     Last dispensed from pharmacy on 11/12/21 is 12/15/21     Prescriptions prepped for review.     Will route to provider.       Lynsey Almaguer RN, BSN, CMSRN  RN Care Coordinator  [facility]

## 2021-12-16 ENCOUNTER — ONCOLOGY VISIT (OUTPATIENT)
Dept: ONCOLOGY | Facility: CLINIC | Age: 66
End: 2021-12-16
Attending: FAMILY MEDICINE
Payer: COMMERCIAL

## 2021-12-16 ENCOUNTER — PRE VISIT (OUTPATIENT)
Dept: ONCOLOGY | Facility: CLINIC | Age: 66
End: 2021-12-16

## 2021-12-16 VITALS
DIASTOLIC BLOOD PRESSURE: 87 MMHG | HEIGHT: 67 IN | WEIGHT: 178 LBS | OXYGEN SATURATION: 92 % | SYSTOLIC BLOOD PRESSURE: 150 MMHG | RESPIRATION RATE: 16 BRPM | TEMPERATURE: 98.5 F | HEART RATE: 91 BPM | BODY MASS INDEX: 27.94 KG/M2

## 2021-12-16 DIAGNOSIS — R79.89 ELEVATED FERRITIN: ICD-10-CM

## 2021-12-16 DIAGNOSIS — E83.10 DISORDER OF IRON METABOLISM, UNSPECIFIED: ICD-10-CM

## 2021-12-16 LAB
ALBUMIN SERPL-MCNC: 3.3 G/DL (ref 3.4–5)
ALP SERPL-CCNC: 61 U/L (ref 40–150)
ALT SERPL W P-5'-P-CCNC: 25 U/L (ref 0–50)
ANION GAP SERPL CALCULATED.3IONS-SCNC: 4 MMOL/L (ref 3–14)
AST SERPL W P-5'-P-CCNC: 13 U/L (ref 0–45)
BASOPHILS # BLD AUTO: 0 10E3/UL (ref 0–0.2)
BASOPHILS NFR BLD AUTO: 0 %
BILIRUB SERPL-MCNC: 0.4 MG/DL (ref 0.2–1.3)
BUN SERPL-MCNC: 19 MG/DL (ref 7–30)
CALCIUM SERPL-MCNC: 9.3 MG/DL (ref 8.5–10.1)
CHLORIDE BLD-SCNC: 101 MMOL/L (ref 94–109)
CO2 SERPL-SCNC: 30 MMOL/L (ref 20–32)
CREAT SERPL-MCNC: 0.8 MG/DL (ref 0.52–1.04)
EOSINOPHIL # BLD AUTO: 0.1 10E3/UL (ref 0–0.7)
EOSINOPHIL NFR BLD AUTO: 1 %
ERYTHROCYTE [DISTWIDTH] IN BLOOD BY AUTOMATED COUNT: 13 % (ref 10–15)
FERRITIN SERPL-MCNC: 232 NG/ML (ref 8–252)
GFR SERPL CREATININE-BSD FRML MDRD: 77 ML/MIN/1.73M2
GLUCOSE BLD-MCNC: 89 MG/DL (ref 70–99)
HCT VFR BLD AUTO: 41.6 % (ref 35–47)
HGB BLD-MCNC: 14.5 G/DL (ref 11.7–15.7)
IMM GRANULOCYTES # BLD: 0 10E3/UL
IMM GRANULOCYTES NFR BLD: 0 %
IRON SATN MFR SERPL: 35 % (ref 15–46)
IRON SERPL-MCNC: 82 UG/DL (ref 35–180)
LAB DIRECTOR COMMENTS: NORMAL
LAB DIRECTOR DISCLAIMER: NORMAL
LAB DIRECTOR INTERPRETATION: NORMAL
LAB DIRECTOR METHODOLOGY: NORMAL
LAB DIRECTOR RESULTS: NORMAL
LDH SERPL L TO P-CCNC: 141 U/L (ref 81–234)
LYMPHOCYTES # BLD AUTO: 2.1 10E3/UL (ref 0.8–5.3)
LYMPHOCYTES NFR BLD AUTO: 30 %
MCH RBC QN AUTO: 35.5 PG (ref 26.5–33)
MCHC RBC AUTO-ENTMCNC: 34.9 G/DL (ref 31.5–36.5)
MCV RBC AUTO: 102 FL (ref 78–100)
MONOCYTES # BLD AUTO: 1 10E3/UL (ref 0–1.3)
MONOCYTES NFR BLD AUTO: 13 %
NEUTROPHILS # BLD AUTO: 3.9 10E3/UL (ref 1.6–8.3)
NEUTROPHILS NFR BLD AUTO: 56 %
NRBC # BLD AUTO: 0 10E3/UL
NRBC BLD AUTO-RTO: 0 /100
PLATELET # BLD AUTO: 191 10E3/UL (ref 150–450)
POTASSIUM BLD-SCNC: 4.1 MMOL/L (ref 3.4–5.3)
PROT SERPL-MCNC: 6.6 G/DL (ref 6.8–8.8)
RBC # BLD AUTO: 4.09 10E6/UL (ref 3.8–5.2)
SODIUM SERPL-SCNC: 135 MMOL/L (ref 133–144)
SPECIMEN DESCRIPTION: NORMAL
TIBC SERPL-MCNC: 237 UG/DL (ref 240–430)
WBC # BLD AUTO: 7.1 10E3/UL (ref 4–11)

## 2021-12-16 PROCEDURE — 81256 HFE GENE: CPT | Performed by: INTERNAL MEDICINE

## 2021-12-16 PROCEDURE — 82728 ASSAY OF FERRITIN: CPT | Performed by: INTERNAL MEDICINE

## 2021-12-16 PROCEDURE — 80053 COMPREHEN METABOLIC PANEL: CPT | Performed by: INTERNAL MEDICINE

## 2021-12-16 PROCEDURE — 83615 LACTATE (LD) (LDH) ENZYME: CPT | Performed by: INTERNAL MEDICINE

## 2021-12-16 PROCEDURE — 83550 IRON BINDING TEST: CPT | Performed by: INTERNAL MEDICINE

## 2021-12-16 PROCEDURE — 99215 OFFICE O/P EST HI 40 MIN: CPT | Performed by: INTERNAL MEDICINE

## 2021-12-16 PROCEDURE — G0452 MOLECULAR PATHOLOGY INTERPR: HCPCS | Mod: 26 | Performed by: INTERNAL MEDICINE

## 2021-12-16 PROCEDURE — 36415 COLL VENOUS BLD VENIPUNCTURE: CPT | Performed by: INTERNAL MEDICINE

## 2021-12-16 PROCEDURE — 85025 COMPLETE CBC W/AUTO DIFF WBC: CPT | Performed by: INTERNAL MEDICINE

## 2021-12-16 ASSESSMENT — PAIN SCALES - GENERAL: PAINLEVEL: SEVERE PAIN (7)

## 2021-12-16 ASSESSMENT — MIFFLIN-ST. JEOR: SCORE: 1367.09

## 2021-12-16 NOTE — LETTER
12/16/2021         RE: Ludy Ramos  [address]  [place] [address]        Dear Colleague,    Thank you for referring your patient, Ludy Ramos, to the [facility]. Please see a copy of my visit note below.    [facility]    NEW PATIENT VISIT NOTE    PATIENT NAME: Ludy Ramos MRN # [id number]  DATE OF VISIT: December 16, 2021 YOB: 1955    REFERRING PROVIDER: Ace Steinberg MD  [address] [place] [place] [address]         HISTORY OF PRESENT ILLNESS   Ludy Ramos is 66 year old female who has been referred for evaluation of hemochromatosis     Ludy has been doing well until recently when she received a letter from her brother who has been diagnosed with hemochromatosis - homozygous for C282Y mutation. She has brought his up with her PCP and has been referred to hematology clinic.     She has pain in several joints. She does not have tanning of skin, no jaundice, no LFT abnormalities, no diabetes, no CHF.     She has hypertension. She hurt her back which got her to pain doctor. She has arthritis in her hips.     PAST MEDICAL HISTORY     Past Medical History:   Diagnosis Date     Generalized anxiety disorder      Hypertension goal BP (blood pressure) < 140/90      Insomnia      Intermittent asthma      Menopausal state      Osteopenia           CURRENT OUTPATIENT MEDICATIONS     Current Outpatient Medications   Medication Sig     albuterol (PROAIR HFA/PROVENTIL HFA/VENTOLIN HFA) 108 (90 Base) MCG/ACT inhaler Inhale 2 puffs into the lungs every 4 hours as needed for shortness of breath / dyspnea     BREO ELLIPTA 200-25 MCG/INH Inhaler Inhale 1 puff by mouth once daily     buprenorphine (BUTRANS) 5 MCG/HR WK patch Place 1 patch onto the skin every 7 days Fill/begin 11/16/2021 and change every 7 days. 28 day script.     buprenorphine (BUTRANS) 5 MCG/HR WK patch Place 1 patch onto the skin every 7 days  Fill/begin 12/8/2021. Change every 7 days. 28 day script.     citalopram (CELEXA) 20 MG tablet Take 1 tablet (20 mg) by mouth daily     losartan-hydrochlorothiazide (HYZAAR) 50-12.5 MG tablet Take 1 tablet by mouth daily     oxyCODONE-acetaminophen (PERCOCET) 5-325 MG tablet Take 1 tablet by mouth every 6 hours as needed for severe pain Fill/Start 12/15/21     RISEdronate (ACTONEL) 35 MG tablet Take 1 tablet (35 mg) by mouth every 7 days     traZODone (DESYREL) 100 MG tablet TAKE 2 TABLETS BY MOUTH AT BEDTIME     ALPRAZolam (XANAX) 0.25 MG tablet Take 1 tablet (0.25 mg) by mouth 3 times daily as needed for anxiety (1 tablet 30-60 minutes before your flight) (Patient not taking: Reported on 9/15/2021)     nystatin (MYCOSTATIN) 360244 UNIT/GM external cream Apply topically 2 times daily (Patient not taking: Reported on 9/15/2021)     No current facility-administered medications for this visit.     Facility-Administered Medications Ordered in Other Visits   Medication     DOBUTamine 500 mg in dextrose 5% 250 mL (adult std conc) premix     metoprolol (LOPRESSOR) injection 5 mg        ALLERGIES      Allergies   Allergen Reactions     Lisinopril      angioedema        SOCIAL HISTORY   She is  and lives with her . She is retired - [employer] as her last job.     She does not smoke. She quit 22 yr ago. She smoked pack a day for 30+ yrs. She drinks daily. She denies problems with drinking. She does not have to drink daily and can go without alcohol.     FAMILY HISTORY   - Hemochromatosis C282Y in her brother  - denies any cancer in any family members     REVIEW OF SYSTEMS   As above in the HPI, o/w complete 12-point ROS was negative.     PHYSICAL EXAM   B/P: 150/87, T: 98.5, P: 91, R: 16  @LASTSAO2(4)@  Wt Readings from Last 3 Encounters:   12/16/21 80.7 kg (178 lb)   09/02/21 81.6 kg (179 lb 12.8 oz)   08/18/21 80 kg (176 lb 6.4 oz)     GEN: NAD  HEENT: PERRL, EOMI, no icterus, injection or pallor. Oropharynx is  clear.  NECK: no cervical or supraclavicular lymphadenopathy  LUNGS: clear bilaterally  CV: regular, no murmurs, rubs, or gallops  ABDOMEN: soft, non-tender, non-distended, normal bowel sounds, no hepatosplenomegaly by percussion or palpation  EXT: warm, well perfused, no edema  NEURO: alert  SKIN: no rashes     LABORATORY AND IMAGING STUDIES     Recent Labs   Lab Test 05/13/19  0958 09/25/17  0858 09/20/17  1233 12/07/16  1456 03/24/16  0946    135 134 135 133   POTASSIUM 4.3 3.7 4.1 3.6 4.1   CHLORIDE 102 97 94 94 97   CO2 30 28 25 33* 32   ANIONGAP 7 10 15* 8 4   BUN 13 11 12 16 12   CR 0.80 0.76 0.88 0.88 0.75   GLC 87 107* 100* 86 95   CAMILLA 8.6 8.7 8.9 8.9 8.9     No results for input(s): MAG, PHOS in the last 17803 hours.  Recent Labs   Lab Test 02/25/21  1726 09/03/20  1443 09/20/17  1233 12/07/16  1456 03/24/16  0946 09/05/14  0842   WBC 7.1 8.2 6.5 6.5 8.5 5.9   HGB 14.6 14.5 15.0 14.1 15.0 14.3    229 239 236 249 197   * 101* 104* 102* 100 105*   NEUTROPHIL  --  62.4 55.5 56.7 61.7 47.5     Recent Labs   Lab Test 12/07/16  1456 03/24/16  0946   BILITOTAL 0.3 0.4   ALKPHOS 67 64   ALT 27 26   AST 20 25   ALBUMIN 3.5 3.4     TSH   Date Value Ref Range Status   09/20/2017 0.58 0.40 - 4.00 mU/L Final   03/21/2012 0.49 0.4 - 5.0 mU/L Final   01/27/2011 1.60 0.4 - 5.0 mU/L Final     No results for input(s): CEA in the last 77468 hours.  Results for orders placed or performed in visit on 10/18/21   PAIN Joint Injection Sacroiliac Joint Left    Narrative    Pre procedure Diagnosis: SI joint dysfunction, left trochanteric bursitis  Post procedure Diagnosis: Same  Procedure performed: left SI joint injection and left trochanteric bursa   injection   Anesthesia: none  Complications: none  Operators: Sara Chinchilla MD and Sander Valdez MD    Indications:   Ludy Ramos is a 65 year old female was sent by CARLITOS Ko CNP  for low back and hip pain.  Exam shows point   tenderness  of the left sacroiliac joint.  Also has left lateral hip point   tenderness.  Additionally hip thrust was positive on the left. They have   tried conservative treatment including medication management.    Options/alternatives, benefits and risks were discussed with the patient   including bleeding, infection, tissue trauma, exposure to radiation,   reaction to medications including seizure, nerve injury, weakness, and   numbness.  Questions were answered to her satisfaction and she agrees to   proceed. Voluntary informed consent was obtained and signed.     Vitals were reviewed: Yes  Allergies were reviewed:  Yes   Medications were reviewed:  Yes   Pre-procedure pain score: 5/10  Procedure:  After getting informed consent, patient was brought into the procedure   suite and was placed in a prone position on the procedure table.   A Pause   for the Cause was performed.  Patient was prepped and draped in sterile   fashion.     After identifying the left SI joint, the C-arm was rotated to a obliquely   to obtain the best view of the inferior angle of the joint.  A total of 3   ml of Lidocaine 1%  was used to anesthetize the skin at a skin entry site   coaxial with the fluoroscopy beam at this location.  A 22gauge 3.5 inch   needle was advanced under intermittent fluoroscopy until it was felt to   enter the SI joint.  Pt had significant discomfort despite numbing outside   of the joint.    A total of 1ml of Omnipaque-300 was injected, confirming appropriate   position, with spread into the intraarticular space, with no intravascular   uptake noted.  9ml was wasted  1.5 ml of 0.5% ropivacaine with 40mg of kenalog was injected.  The needle   was flushed with lidocaine and removed.    Without fluoroscopy:  The area over the left trochanter was palpated, and the tender area was   identified, corresponding to the area of the trochanteric bursa.  The area   was cleaned with Chloroprep.  A 25 G 3.5 inch needle was inserted,  aiming   towards the trochanter.  When bone was encountered, the needle was   slightly drawn.  A solution containing local anesthesic and steroid was   injected.  The needle was removed.  Hemostasis was achieved. Bandaids were   placed as appropriate.      In total, 2ml of 0.2% ropivacaine, 2ml of 1% lidocaine, and 20mg of   kenalog was injected.       Hemostasis was achieved, the area was cleaned, and bandaids were placed   when appropriate.  The patient tolerated the procedure well, and was taken to the recovery   room.    Images were saved to PACS.    Post-procedure pain score: 0/10  Follow-up includes:   -f/u with referring provider  -if hip pain seems to continue, would consider doing the left   intraarticular hip injection- which was already ordered by Dr. Heaton.    Sara Chinchilla MD  [facility]              Lab Results   Component Value Date    PATH  05/05/2017     Patient Name: BRYNN VICTOR  MR#: [id number]  Specimen #: [id number]  Collected: 5/5/2017  Received: 5/8/2017  Reported: 5/9/2017 11:45  Ordering Phy(s): WILLIAM CHARLES DUANE    For improved result formatting, select 'View Enhanced Report Format'  under Linked Documents section.    SPECIMEN(S):  A: Splenic flexure polyp  B: Hepatic flexure polyp    FINAL DIAGNOSIS:  A. COLON, SPLENIC FLEXURE POLYP, POLYPECTOMY:  - Tubular adenoma, fragmented  - Negative for high-grade dysplasia and invasive malignancy    B. COLON, HEPATIC FLEXURE POLYP x 2, POLYPECTOMIES:  - Tubular adenoma, fragmented  - Negative for high-grade dysplasia and invasive malignancy    I have personally reviewed all specimens and or slides, including the  listed special stains, and used them with my medical judgement to  determine the final diagnosis.    Electronically signed out by:    Javan Bertrand M.D    CLINICAL HISTORY:  High risk colon cancer surveillance: Personal history of colonic polyps    GROSS:  A. The specimen is received in formalin with  "proper patient's  identification, labeled \"splenic flexure polyp\" and consists of three  tan soft tissue fragments measuring 0.3 cm, 0.2 cm and 0.1 cm in maximum  dimension. Entirely submitted in one cassette.    B. The specimen is received in formalin with proper patient's  identification, labeled \"hepatic flexure polyp\" and consists of two  brown tan soft tissue fragments measuring 0.4 cm and 0.3 cm in maximal  dimension. Entirely submitted in one cassette. (Dictated by: Alfonso Mcconnell 5/8/2017 08:42 AM)    MICROSCOPIC:  Microscopic examination was performed.    CPT Codes:  A: 48383-BJ7  B: 08343-PX5    TESTING LAB LOCATION:  [facility], [address]   [address] [phone]    COLLECTION SITE[facility]  [facility]  Location: [facility] (B[facility]       Recent Labs   Lab Test 08/18/21  1358   LUIGI 334*   IRON 152   *   IRONSAT 85*       ASSESSMENT    Elevated ferritin and iron saturation  Osteoarthritis  Chronic low back pain after fall  Hypertension, generalized anxiety disorder    DISCUSSION   Ludy is accompanied by her  to this clinic visit.  Her brother has been recently notified of having hemochromatosis with homozygous C282Y mutation.     Ludy has been healthy so far and has been dealing with osteoarthritis and chronic low back pain in addition to hypertension and generalized anxiety disorder. I had a lengthy discussion with Ludy. I reviewed that iron levels are maintained in a relatively tight range in the body. While most commonly patients struggle with low levels of iron in the presence or absence of anemia, some have the reverse problem of excess iron. Hemochromatosis is a disease in which too much iron builds up in the body due to impaired iron absorption where the body absorbs more iron than is needed. Body has no natural way to get rid of the extra iron and this gets deposited " in tissues, especially the liver, heart, and pancreas. The extra iron can damage these organs.     In her particular case her iron levels are only minimally higher than normal at 334 ng/ml with normal range up to 252 ng/ml. For most patients with hemochromatosis, I have seen much higher ferritin levels. Ferritin is an acute phase reactant and levels rise in any acute inflammatory state. I do suspect that she does at least have one abnormal copy given the very high percent saturation of iron.  It is also possible that she actually has the mutation and has not manifested overt iron overload.  It does manifest later in life in females due to chronic blood loss with menstrual cycles.    Hereditary hemochromatosis is well established and studied in  population and one of the mutations involving the hemochromatosis gene (referred as HFE gene) leads to hemochromatosis. We will assess for the presence of that mutation. Patients with this mutations need serial phlebotomies (blood letting) to keep iron levels within normal range. Excess iron can end up depositing underneath skin giving it a tan color, in liver leading to hepatitis all the way to failure, pancreas leading to pancreatitis, heart leading to congestive heart failure and so on.    I reviewed the dietary sources of iron besides from dietary supplements that can have iron. She should not take iron supplements or vitamins with iron in them, vitamin C supplements. Other food to avoid is red meat, raw shellfish and decrease alcohol consumption.      Dietary sources of iron - per Uptodate  Food Approximate measure Iron (mg)   High iron sources    Cream of Wheat (quick or instant)* 1/2 cup 7.8   Kidney, beef  2 oz (60 g) 5.3   Liver, beef  2 oz (60 g) 5.8   Liver, calf  2 oz (60 g) 9   Liver, chicken  2 oz (60 g) 6   Liverwurst  2 oz (60 g) 3.6   Prune juice 1/2 cup 5.1   Spinach 1/2 cup 3.2       Once we determine that she does not have hemochromatosis,  she  could merely choose to donate blood to [place] or at other blood drives few times a year. This would help others and also address her iron excess. We would have to follow ferritin levels annually. Heterozygous patients with hemochromatosis do not end up having excess iron depositing in tissues and thereby do not need any additional care - though occasional phlebotomy could keep the iron levels closer to normal range.      PLAN   I will get CBC with diff, CMP including hepatic panel  I will get her checked for hemochromatosis mutations   She would like to follow results over mychart    45 minutes spent on the date of the encounter doing chart review, history and exam, documentation and further activities as noted above       Fitz Laureano  Adj ,  Division of Hematology, Oncology & Transplantation  [facility].         Again, thank you for allowing me to participate in the care of your patient.        Sincerely,        Fitz Laureano MD

## 2021-12-16 NOTE — PROGRESS NOTES
[facility] CANCER CLINIC    NEW PATIENT VISIT NOTE    PATIENT NAME: Ludy Ramos MRN # [id number]  DATE OF VISIT: December 16, 2021 YOB: 1955    REFERRING PROVIDER: Ace Steinberg MD  [id number] [place] [address]         HISTORY OF PRESENT ILLNESS   Ludy Ramos is 66 year old female who has been referred for evaluation of hemochromatosis     Ludy has been doing well until recently when she received a letter from her brother who has been diagnosed with hemochromatosis - homozygous for C282Y mutation. She has brought his up with her PCP and has been referred to hematology clinic.     She has pain in several joints. She does not have tanning of skin, no jaundice, no LFT abnormalities, no diabetes, no CHF.     She has hypertension. She hurt her back which got her to pain doctor. She has arthritis in her hips.     PAST MEDICAL HISTORY     Past Medical History:   Diagnosis Date     Generalized anxiety disorder      Hypertension goal BP (blood pressure) < 140/90      Insomnia      Intermittent asthma      Menopausal state      Osteopenia           CURRENT OUTPATIENT MEDICATIONS     Current Outpatient Medications   Medication Sig     albuterol (PROAIR HFA/PROVENTIL HFA/VENTOLIN HFA) 108 (90 Base) MCG/ACT inhaler Inhale 2 puffs into the lungs every 4 hours as needed for shortness of breath / dyspnea     BREO ELLIPTA 200-25 MCG/INH Inhaler Inhale 1 puff by mouth once daily     buprenorphine (BUTRANS) 5 MCG/HR WK patch Place 1 patch onto the skin every 7 days Fill/begin 11/16/2021 and change every 7 days. 28 day script.     buprenorphine (BUTRANS) 5 MCG/HR WK patch Place 1 patch onto the skin every 7 days Fill/begin 12/8/2021. Change every 7 days. 28 day script.     citalopram (CELEXA) 20 MG tablet Take 1 tablet (20 mg) by mouth daily     losartan-hydrochlorothiazide (HYZAAR) 50-12.5 MG tablet Take 1 tablet by mouth daily     oxyCODONE-acetaminophen (PERCOCET) 5-325  MG tablet Take 1 tablet by mouth every 6 hours as needed for severe pain Fill/Start 12/15/21     RISEdronate (ACTONEL) 35 MG tablet Take 1 tablet (35 mg) by mouth every 7 days     traZODone (DESYREL) 100 MG tablet TAKE 2 TABLETS BY MOUTH AT BEDTIME     ALPRAZolam (XANAX) 0.25 MG tablet Take 1 tablet (0.25 mg) by mouth 3 times daily as needed for anxiety (1 tablet 30-60 minutes before your flight) (Patient not taking: Reported on 9/15/2021)     nystatin (MYCOSTATIN) 830325 UNIT/GM external cream Apply topically 2 times daily (Patient not taking: Reported on 9/15/2021)     No current facility-administered medications for this visit.     Facility-Administered Medications Ordered in Other Visits   Medication     DOBUTamine 500 mg in dextrose 5% 250 mL (adult std conc) premix     metoprolol (LOPRESSOR) injection 5 mg        ALLERGIES      Allergies   Allergen Reactions     Lisinopril      angioedema        SOCIAL HISTORY   She is  and lives with her . She is retired - [employer] as her last job.     She does not smoke. She quit 22 yr ago. She smoked pack a day for 30+ yrs. She drinks daily. She denies problems with drinking. She does not have to drink daily and can go without alcohol.     FAMILY HISTORY   - Hemochromatosis C282Y in her brother  - denies any cancer in any family members     REVIEW OF SYSTEMS   As above in the HPI, o/w complete 12-point ROS was negative.     PHYSICAL EXAM   B/P: 150/87, T: 98.5, P: 91, R: 16  @LASTSAO2(4)@  Wt Readings from Last 3 Encounters:   12/16/21 80.7 kg (178 lb)   09/02/21 81.6 kg (179 lb 12.8 oz)   08/18/21 80 kg (176 lb 6.4 oz)     GEN: NAD  HEENT: PERRL, EOMI, no icterus, injection or pallor. Oropharynx is clear.  NECK: no cervical or supraclavicular lymphadenopathy  LUNGS: clear bilaterally  CV: regular, no murmurs, rubs, or gallops  ABDOMEN: soft, non-tender, non-distended, normal bowel sounds, no hepatosplenomegaly by percussion or palpation  EXT: warm, well perfused,  no edema  NEURO: alert  SKIN: no rashes     LABORATORY AND IMAGING STUDIES     Recent Labs   Lab Test 05/13/19  0958 09/25/17  0858 09/20/17  1233 12/07/16  1456 03/24/16  0946    135 134 135 133   POTASSIUM 4.3 3.7 4.1 3.6 4.1   CHLORIDE 102 97 94 94 97   CO2 30 28 25 33* 32   ANIONGAP 7 10 15* 8 4   BUN 13 11 12 16 12   CR 0.80 0.76 0.88 0.88 0.75   GLC 87 107* 100* 86 95   CAMILLA 8.6 8.7 8.9 8.9 8.9     No results for input(s): MAG, PHOS in the last 63699 hours.  Recent Labs   Lab Test 02/25/21  1726 09/03/20  1443 09/20/17  1233 12/07/16  1456 03/24/16  0946 09/05/14  0842   WBC 7.1 8.2 6.5 6.5 8.5 5.9   HGB 14.6 14.5 15.0 14.1 15.0 14.3    229 239 236 249 197   * 101* 104* 102* 100 105*   NEUTROPHIL  --  62.4 55.5 56.7 61.7 47.5     Recent Labs   Lab Test 12/07/16  1456 03/24/16  0946   BILITOTAL 0.3 0.4   ALKPHOS 67 64   ALT 27 26   AST 20 25   ALBUMIN 3.5 3.4     TSH   Date Value Ref Range Status   09/20/2017 0.58 0.40 - 4.00 mU/L Final   03/21/2012 0.49 0.4 - 5.0 mU/L Final   01/27/2011 1.60 0.4 - 5.0 mU/L Final     No results for input(s): CEA in the last 42459 hours.  Results for orders placed or performed in visit on 10/18/21   PAIN Joint Injection Sacroiliac Joint Left    Narrative    Pre procedure Diagnosis: SI joint dysfunction, left trochanteric bursitis  Post procedure Diagnosis: Same  Procedure performed: left SI joint injection and left trochanteric bursa   injection   Anesthesia: none  Complications: none  Operators: Sara Chinchilla MD and Sander Valdez MD    Indications:   Ludy Ramos is a 65 year old female was sent by CARLITOS Ko CNP  for low back and hip pain.  Exam shows point   tenderness of the left sacroiliac joint.  Also has left lateral hip point   tenderness.  Additionally hip thrust was positive on the left. They have   tried conservative treatment including medication management.    Options/alternatives, benefits and risks were discussed with the  patient   including bleeding, infection, tissue trauma, exposure to radiation,   reaction to medications including seizure, nerve injury, weakness, and   numbness.  Questions were answered to her satisfaction and she agrees to   proceed. Voluntary informed consent was obtained and signed.     Vitals were reviewed: Yes  Allergies were reviewed:  Yes   Medications were reviewed:  Yes   Pre-procedure pain score: 5/10  Procedure:  After getting informed consent, patient was brought into the procedure   suite and was placed in a prone position on the procedure table.   A Pause   for the Cause was performed.  Patient was prepped and draped in sterile   fashion.     After identifying the left SI joint, the C-arm was rotated to a obliquely   to obtain the best view of the inferior angle of the joint.  A total of 3   ml of Lidocaine 1%  was used to anesthetize the skin at a skin entry site   coaxial with the fluoroscopy beam at this location.  A 22gauge 3.5 inch   needle was advanced under intermittent fluoroscopy until it was felt to   enter the SI joint.  Pt had significant discomfort despite numbing outside   of the joint.    A total of 1ml of Omnipaque-300 was injected, confirming appropriate   position, with spread into the intraarticular space, with no intravascular   uptake noted.  9ml was wasted  1.5 ml of 0.5% ropivacaine with 40mg of kenalog was injected.  The needle   was flushed with lidocaine and removed.    Without fluoroscopy:  The area over the left trochanter was palpated, and the tender area was   identified, corresponding to the area of the trochanteric bursa.  The area   was cleaned with Chloroprep.  A 25 G 3.5 inch needle was inserted, aiming   towards the trochanter.  When bone was encountered, the needle was   slightly drawn.  A solution containing local anesthesic and steroid was   injected.  The needle was removed.  Hemostasis was achieved. Bandaids were   placed as appropriate.      In total, 2ml of  "0.2% ropivacaine, 2ml of 1% lidocaine, and 20mg of   kenalog was injected.       Hemostasis was achieved, the area was cleaned, and bandaids were placed   when appropriate.  The patient tolerated the procedure well, and was taken to the recovery   room.    Images were saved to PACS.    Post-procedure pain score: 0/10  Follow-up includes:   -f/u with referring provider  -if hip pain seems to continue, would consider doing the left   intraarticular hip injection- which was already ordered by Dr. Heaton.    Sara Chinchilla MD  [facility]              Lab Results   Component Value Date    PATH  05/05/2017     Patient Name: BRYNN VICTOR  MR#: [id number]  Specimen #: [id number]  Collected: 5/5/2017  Received: 5/8/2017  Reported: 5/9/2017 11:45  Ordering Phy(s): WILLIAM CHARLES DUANE    For improved result formatting, select 'View Enhanced Report Format'  under Linked Documents section.    SPECIMEN(S):  A: Splenic flexure polyp  B: Hepatic flexure polyp    FINAL DIAGNOSIS:  A. COLON, SPLENIC FLEXURE POLYP, POLYPECTOMY:  - Tubular adenoma, fragmented  - Negative for high-grade dysplasia and invasive malignancy    B. COLON, HEPATIC FLEXURE POLYP x 2, POLYPECTOMIES:  - Tubular adenoma, fragmented  - Negative for high-grade dysplasia and invasive malignancy    I have personally reviewed all specimens and or slides, including the  listed special stains, and used them with my medical judgement to  determine the final diagnosis.    Electronically signed out by:    Javan Bertrand M.D    CLINICAL HISTORY:  High risk colon cancer surveillance: Personal history of colonic polyps    GROSS:  A. The specimen is received in formalin with proper patient's  identification, labeled \"splenic flexure polyp\" and consists of three  tan soft tissue fragments measuring 0.3 cm, 0.2 cm and 0.1 cm in maximum  dimension. Entirely submitted in one cassette.    B. The specimen is received in formalin with proper " "patient's  identification, labeled \"hepatic flexure polyp\" and consists of two  brown tan soft tissue fragments measuring 0.4 cm and 0.3 cm in maximal  dimension. Entirely submitted in one cassette. (Dictated by: Alfonso Mcconnell 5/8/2017 08:42 AM)    MICROSCOPIC:  Microscopic examination was performed.    CPT Codes:  A: 51481-KS3  B: 92933-[place]8    TESTING LAB LOCATION:  [facility], [address]   [id number] [phone]    COLLECTION SITE:  Client: [facility]  Location: [facility] (B)       Recent Labs   Lab Test 08/18/21  1358   LUIGI 334*   IRON 152   *   IRONSAT 85*       ASSESSMENT    Elevated ferritin and iron saturation  Osteoarthritis  Chronic low back pain after fall  Hypertension, generalized anxiety disorder    DISCUSSION   Ludy is accompanied by her  to this clinic visit.  Her brother has been recently notified of having hemochromatosis with homozygous C282Y mutation.     Ludy has been healthy so far and has been dealing with osteoarthritis and chronic low back pain in addition to hypertension and generalized anxiety disorder. I had a lengthy discussion with Ludy. I reviewed that iron levels are maintained in a relatively tight range in the body. While most commonly patients struggle with low levels of iron in the presence or absence of anemia, some have the reverse problem of excess iron. Hemochromatosis is a disease in which too much iron builds up in the body due to impaired iron absorption where the body absorbs more iron than is needed. Body has no natural way to get rid of the extra iron and this gets deposited in tissues, especially the liver, heart, and pancreas. The extra iron can damage these organs.     In her particular case her iron levels are only minimally higher than normal at 334 ng/ml with normal range up to 252 ng/ml. For most patients with hemochromatosis, I " have seen much higher ferritin levels. Ferritin is an acute phase reactant and levels rise in any acute inflammatory state. I do suspect that she does at least have one abnormal copy given the very high percent saturation of iron.  It is also possible that she actually has the mutation and has not manifested overt iron overload.  It does manifest later in life in females due to chronic blood loss with menstrual cycles.    Hereditary hemochromatosis is well established and studied in  population and one of the mutations involving the hemochromatosis gene (referred as HFE gene) leads to hemochromatosis. We will assess for the presence of that mutation. Patients with this mutations need serial phlebotomies (blood letting) to keep iron levels within normal range. Excess iron can end up depositing underneath skin giving it a tan color, in liver leading to hepatitis all the way to failure, pancreas leading to pancreatitis, heart leading to congestive heart failure and so on.    I reviewed the dietary sources of iron besides from dietary supplements that can have iron. She should not take iron supplements or vitamins with iron in them, vitamin C supplements. Other food to avoid is red meat, raw shellfish and decrease alcohol consumption.      Dietary sources of iron - per Uptodate  Food Approximate measure Iron (mg)   High iron sources    Cream of Wheat (quick or instant)* 1/2 cup 7.8   Kidney, beef  2 oz (60 g) 5.3   Liver, beef  2 oz (60 g) 5.8   Liver, calf  2 oz (60 g) 9   Liver, chicken  2 oz (60 g) 6   Liverwurst  2 oz (60 g) 3.6   Prune juice 1/2 cup 5.1   Spinach 1/2 cup 3.2       Once we determine that she does not have hemochromatosis,  she could merely choose to donate blood to [employer] or at other blood drives few times a year. This would help others and also address her iron excess. We would have to follow ferritin levels annually. Heterozygous patients with hemochromatosis do not end up having  excess iron depositing in tissues and thereby do not need any additional care - though occasional phlebotomy could keep the iron levels closer to normal range.      PLAN   I will get CBC with diff, CMP including hepatic panel  I will get her checked for hemochromatosis mutations   She would like to follow results over mychart    45 minutes spent on the date of the encounter doing chart review, history and exam, documentation and further activities as noted above       Fitz Cruz ,  Division of Hematology, Oncology & Transplantation  [facility].

## 2021-12-16 NOTE — NURSING NOTE
"Oncology Rooming Note    December 16, 2021 2:04 PM   Ludy Ramos is a 66 year old female who presents for:    Chief Complaint   Patient presents with     Oncology Clinic Visit     New Patient     Initial Vitals: BP (!) 150/87 (BP Location: Left arm)   Pulse 91   Temp 98.5  F (36.9  C) (Oral)   Resp 16   Ht 1.689 m (5' 6.5\")   Wt 80.7 kg (178 lb)   SpO2 92%   BMI 28.30 kg/m   Estimated body mass index is 28.3 kg/m  as calculated from the following:    Height as of this encounter: 1.689 m (5' 6.5\").    Weight as of this encounter: 80.7 kg (178 lb). Body surface area is 1.95 meters squared.  Severe Pain (7) Comment: Data Unavailable   No LMP recorded. Patient has had a hysterectomy.  Allergies reviewed: Yes  Medications reviewed: Yes    Medications: Medication refills not needed today.  Pharmacy name entered into [facility]:    [id number] [place] [facility] [address] [id number] - [place], [place] - [address]    Clinical concerns: New Patient       April ANNITA Walker            " Statement Selected

## 2021-12-29 ENCOUNTER — TELEPHONE (OUTPATIENT)
Dept: PALLIATIVE MEDICINE | Facility: CLINIC | Age: 66
End: 2021-12-29
Payer: COMMERCIAL

## 2021-12-29 NOTE — TELEPHONE ENCOUNTER
Agree with nursing recommendations.  I do not think this is a medical emergency at this time.     Butrans does not cause shortness of breath so if this continues she should go to the UC or ER immediately.      Recommend that she keep her follow up appointment with Raisa to discuss this further.     Thalia Benoit MD

## 2021-12-29 NOTE — TELEPHONE ENCOUNTER
Ludy called in reporting trouble breathing from Butrans Patch, attributing trouble breathing d/t rib cage sore   of note Ludy able to keep conversation, not SOB on phone call. Ludy does not think this is currently a medical emergency.    Reason for sore ribs- cough, injury etc? No  Reporting Loss of appetite, Anxiety increased    Ludy started Butrans Patch- 14 days ago   symptoms started after started 2nd patch last Saturday slowing getting worse she states    Still on percocet? Yes, no changes, no reported side effects takes 1 tablet a day she states for many months     Of note uLdy does not want to restart patch, notes [place] going [place] of [place] & [place] be in [place]    Ludy instructed to remove patch immediately, if symptoms worsen or do not improve please seek medical attention.     Appointment with CARLITOS Kennedy, CNP on 01/14/22     Routing to advising provider Dr. Hortensia Chinchilla as well as CARLITOS Kennedy, CARYL Almaguer RN, BSN, CMSRN  RN Care Coordinator  [facility]

## 2022-01-14 ENCOUNTER — OFFICE VISIT (OUTPATIENT)
Dept: PALLIATIVE MEDICINE | Facility: CLINIC | Age: 67
End: 2022-01-14
Payer: COMMERCIAL

## 2022-01-14 VITALS — SYSTOLIC BLOOD PRESSURE: 137 MMHG | DIASTOLIC BLOOD PRESSURE: 91 MMHG | HEART RATE: 91 BPM

## 2022-01-14 DIAGNOSIS — S32.028D OTHER CLOSED FRACTURE OF SECOND LUMBAR VERTEBRA WITH ROUTINE HEALING, SUBSEQUENT ENCOUNTER: ICD-10-CM

## 2022-01-14 DIAGNOSIS — F11.90 CHRONIC, CONTINUOUS USE OF OPIOIDS: ICD-10-CM

## 2022-01-14 DIAGNOSIS — M70.62 TROCHANTERIC BURSITIS OF LEFT HIP: Primary | ICD-10-CM

## 2022-01-14 DIAGNOSIS — G89.4 CHRONIC PAIN SYNDROME: ICD-10-CM

## 2022-01-14 DIAGNOSIS — M54.50 CHRONIC LEFT-SIDED LOW BACK PAIN WITHOUT SCIATICA: ICD-10-CM

## 2022-01-14 DIAGNOSIS — G89.29 CHRONIC LEFT-SIDED LOW BACK PAIN WITHOUT SCIATICA: ICD-10-CM

## 2022-01-14 PROCEDURE — 99213 OFFICE O/P EST LOW 20 MIN: CPT | Performed by: NURSE PRACTITIONER

## 2022-01-14 RX ORDER — OXYCODONE AND ACETAMINOPHEN 5; 325 MG/1; MG/1
1 TABLET ORAL EVERY 6 HOURS PRN
Qty: 45 TABLET | Refills: 0 | Status: SHIPPED | OUTPATIENT
Start: 2022-01-14 | End: 2022-03-01

## 2022-01-14 ASSESSMENT — PAIN SCALES - GENERAL: PAINLEVEL: SEVERE PAIN (6)

## 2022-01-14 NOTE — PROGRESS NOTES
[facility]    1/14/2022    Chief complaint:   left sided low back pain, left SI joint pain (this is slightly improved after injection 10/18/2021) and left lateral hip pain  -her lateral left hip pain is the most bothersome    Interval history:  Ludy Ramos is a 66 year old female is known to me for:  -Chronic left SI joint pain  -SI joint dysfunction of left side  -Greater trochanteric bursitis of the left hip  -Chronic left sided low back pain without sciatica  -Closed fracture of first lumbar vertebra, unspecified fracture morphology/sequela  -Compression fracture of T12 vertebra, sequela  -PMHx includes: menopausal state, insomnia, osteopenia, intermittent asthma, generalized anxiety disorder, HTN  -PSHx includes: tonsillectomy, hysterectomy, right 2nd digit surgery, left 4th digit finger surgery, colonoscopy x 5, cosmetic surgery  .    Recommendations/plan at the last visit on 11/16/2021 included:  1. Physical Therapy: not at present  2. I am in support of you joining [employer] at the gym  3. Clinical Health Psychologist to address issues of relaxation, behavioral change, coping style, and other factors important to improvement: none  4. Diagnostic Studies: none  5. Medication Management:   1. I am OK with you using oxycodone/acetminophen 1-2 tabs per day as you are.I will take over Percocet in December  2. START Butrans 5mcg/hr transdermal patch, change every 7 days. Discussed use at length.   6. Further procedures recommended: none   7. Acupuncture: none  8. Urine toxicology screen today: yes, last used percocet yesterday and marijuana yesterday  9. Sign CSA today  10. Recommendations/follow-up for PCP:  See above  11. Release of information: none  12. Follow up: 3-4 weeks of starting Butrans. Please call [phone] to make your follow-up appointment with me.   .you should see your Primary Care Provider to re-check your blood pressure         Since her last visit,  "Ludy Ramos reports:    Interval history January 14, 2022  -her left lateral hip pain is the most bothersome  -still has pain on the left lower back over SI joint, still improved some after injection in October.   -she did not tolerate Butrans due to side effects, caused increased rib pain, nausea and issues with breathing that went away after removal of the patch.          Interval history November 16, 2021  -she did not feel well in [place] as it really bothered her left lateral hip. She had to bring out her chair to sit and rest.   -If she is walking on treadmill it seems better but she has not been able to do any elevation on her treadmill.  -she has no anterior hip joint pain.   -she started having more pain when she was taking gabapentin, it went away when she stopped taking gabapentin.       Pain history collected at initial consultation on 9/15/2021  Low back pain on the left side began about 2 years ago after a lumbar vertebral fracture after a fall. She has osteopenia. She has had a recent MRI yesterday and will be having a  cortisone injection in the left hip on 9/28/2021.         At this point, the patient's participation with our multidisciplinary team includes:  The patient has been compliant with the program.  PT - has not helped  Health Psych - none      Pain scores:  Pain intensity on average is 6-7 on a scale of 0-10.    Range is 5-9/10.  Pain right now is 6-7/10.   Pain is described as \"achy constant pain.\"    Pain is intermittent in nature    Current pain relevant medications:   -oxycodone/acetaminophen 5/325mg take 1 tab Q 6 hours PRN pain don't take within 6 hours of alprazolam (helpful, use once per day every day)       Other pertinent medications:  -alprazolam 0.25mg take 1 tab TID PRN anxiety, take 30-60 minutes prior to flight (uses rarely for flying)  -Celexa 20mg every day (helpful)  -trazodone 100mg take 2 tabs at bedtime (helpful for sleep)           Previous medication treatments " included:  OPIATES: Percocet (helpful), butrans (had significant side effects with 5mcg/hr, anxiety, worse pain, issues with breathing)  NSAIDS: Nabumetone (not helpful), ibuprofen (not helpful), Aleve (not helpful)  MUSCLE RELAXANTS: cyclobenzaprine (not helpful)  ANTI-MIGRAINE MEDS: none  ANTI-DEPRESSANTS: Celexa (helpful), trazodone (helpful)  SLEEP AIDS: none  ANTI-CONVULSANTS: gabapentin (side effects, nausea, felt she had breathing issues)  TOPICALS: Voltaren gel (not helpful), Lidocaine patch (not helpful)  Other meds: Tylenol (not helpful)  Medical Cannabis (not helpful)        Other treatments have included:  Ludy LUANA Ramos has been seen at a pain clinic in the past.  See here by me for neck pain in 2016  PT: tried for neck pain, not helpful  Chiropractic care: none  Acupuncture: none  TENs Unit: tried, not helpful for back pain       Injections:   Has had 2 previous injections in the left hip, many years ago. Neither were helpful  -10/18/2021 left SI joint injection and left trochanteric bursal injection with Dr. Sara Chinchilla  (SI joint injection was somewhat helpful, the left greater trochanteric bursal injection did not help very much. She felt a lot of pressure during the injections and does not wish to do them again)        Side Effects: Gabapentin increased her pain, went away when she stopped taking it  Patient is using the medication as prescribed: NO    Medications:  Current Outpatient Medications   Medication Sig Dispense Refill     albuterol (PROAIR HFA/PROVENTIL HFA/VENTOLIN HFA) 108 (90 Base) MCG/ACT inhaler Inhale 2 puffs into the lungs every 4 hours as needed for shortness of breath / dyspnea 17 g 3     BREO ELLIPTA 200-25 MCG/INH Inhaler Inhale 1 puff by mouth once daily 180 each 0     citalopram (CELEXA) 20 MG tablet Take 1 tablet (20 mg) by mouth daily 90 tablet 2     losartan-hydrochlorothiazide (HYZAAR) 50-12.5 MG tablet Take 1 tablet by mouth daily 90 tablet 3      oxyCODONE-acetaminophen (PERCOCET) 5-325 MG tablet Take 1 tablet by mouth every 6 hours as needed for severe pain Fill/Start 1/14/2022. 30 day script 45 tablet 0     RISEdronate (ACTONEL) 35 MG tablet Take 1 tablet (35 mg) by mouth every 7 days 12 tablet 3     traZODone (DESYREL) 100 MG tablet TAKE 2 TABLETS BY MOUTH AT BEDTIME 180 tablet 2     ALPRAZolam (XANAX) 0.25 MG tablet Take 1 tablet (0.25 mg) by mouth 3 times daily as needed for anxiety (1 tablet 30-60 minutes before your flight) (Patient not taking: Reported on 9/15/2021) 6 tablet 0     minocycline (MINOCIN) 100 MG capsule Take 1 capsule (100 mg) by mouth 2 times daily 60 capsule 2     nystatin (MYCOSTATIN) 655696 UNIT/GM external cream Apply topically 2 times daily (Patient not taking: Reported on 9/15/2021) 30 g 4       Medical History: any changes in medical history since they were last seen? 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   Tobacco use: quit smoking > 20 years ago  Alcohol use: has an alcoholic drink once per week.   Drug use: marijuana years ago  History of chemical dependency treatment: none       Is patient a current smoker or tobacco user?  no  If yes, was cessation counseling offered?  no          Physical Exam:  Vital signs: Blood pressure (!) 137/91, pulse 91, not currently breastfeeding.    Behavioral observations:  Awake, alert, cooperative    Gait:  Slow    Musculoskeletal exam:    Lumbar flexion 70 degrees  Lumbar extension 20 degrees  Lumbar ext/rot to the right pain-free  Lumbar ext/rot to the left pain-free  SI joints non-tender bilaterally  Piriformis are non-tender bilaterally  GTs are tender on the left side    Neuro exam:  SILT in all extremities    Skin/vascular/autonomic:  No suspicious lesions on exposed skin.     Other:  na    Is the patient hypertensive today? yes  Hypertensive on recheck of BP?   Very mild  If yes,  was patient recommended to see Primary Care Provider in follow up for management of HTN?  yes        Diagnostic tests:  LUMBAR SPINE TWO-THREE  VIEWS  6/24/2020 10:10 AM      HISTORY: lower vertebral tenderness on exam after trauma, hx of  osteopenia; Acute right-sided low back pain without sciatica     COMPARISON: Film dated 11/23/2010     FINDINGS: There are age indeterminant compression fractures of T12 in  the superior endplate of L2. These are new since 11/23/2010. There is  loss of disc space height at L5-S1..                                                                      IMPRESSION:   1. Age indeterminant compression fractures of T12 and L1. These are  new since 2010.  2. Degenerative change at L5-S1.     RADHA BROWN MD           PELVIS AND HIP BILATERAL TWO VIEWS   5/7/2021 12:16 PM      HISTORY:  Bilateral hip pain.     FINDINGS:  Mild lower lumbar spine degenerative change.                                                                      IMPRESSION: Moderate left hip joint space narrowing superomedially.  Unremarkable right hip. No fracture identified.      EUGENIO SALAZAR MD           Exam: MRI of the left hip dated 9/13/2021.     COMPARISON: Radiographs dated 5/7/2021.     CLINICAL HISTORY: Hip pain.     TECHNIQUE: Multiplanar, multisequence MR imaging of the left hip was  obtained using standard sequences in 3 orthogonal planes without the  use of intravenous or intra-articular gadolinium contrast.     FINDINGS:     No significant fluid at the left hip joint.     No marrow signal abnormalities to suggest fracture, osteonecrosis, or  marrow infiltration.     Likely diffuse cartilage thinning which is not well evaluated. No  subchondral edema. The labrum is poorly evaluated.     The neurovascular structures are unremarkable. No lymphadenopathy. The  sciatic nerve is unremarkable. No full-thickness tendon tear or tendon  retraction. Tendinosis of the hamstring tendons at their origin on the  ischial  tuberosity with insertional tendinopathy of the gluteus  minimus and gluteus and medius tendons. Diverticulosis without  evidence of diverticulitis.                                                                      IMPRESSION:  1. No significant left hip joint effusion.  2. No marrow signal abnormalities in the left hip to suggest fracture,  osteonecrosis, or marrow infiltration.  3. Likely diffuse cartilage thinning, not well visualized. No definite  full-thickness cartilage loss or subchondral edema.     PIEDAD MONROE MD                  Other testing (labs, diagnostics):  5/13/2019  Cr. 0.80  Est GFR 77        Screening tools:      DIRE Score for ongoing opioid management is calculated as follows:     Diagnosis = 2     Intractability = 2     Risk: Psych = 2  Chem Hlth = 2  Reliability = 2  Social = 2     Efficacy = 2     Total DIRE Score = 14 (14 or higher predicts good candidate for ongoing opioid management; 13 or lower predicts poor candidate for opioid management)            [place] Prescription Monitoring Program:  Reviewed [facility] 1/14/2022- no concerning fills.  Raisa URBINA RN CNP, FNP  [facility]  [place] location          Assessment:   1. Left greater trochanteric bursitis  2. Chronic left sided low back pain without sciatica  3. Other closed fracture of 2nd lumbar vertebra with routine  healing, subsequent encounter  4. Chronic pain syndrome  5. Chronic continuous use of opioids    6. Compression fracture of T12 vertebra, sequela  7. PMHx includes: menopausal state, insomnia, osteopenia, intermittent asthma, generalized anxiety disorder, HTN  8. PSHx includes: tonsillectomy, hysterectomy, right 2nd digit surgery, left 4th digit finger surgery, colonoscopy x 5, cosmetic surgery        Plan:   1. Physical Therapy: not at present  2. I am in support of you joining [employer] at the gym  3. Clinical Health Psychologist to address issues of relaxation,  behavioral change, coping style, and other factors important to improvement: none  4. Diagnostic Studies: none  5. Medication Management:   1. I am OK with you using oxycodone/acetminophen 1-2 tabs per day as you are.# 45/month. Fill/begin 1/14/2022 to last 30 days  2. Trial Voltaren gel 5% over-the-counter. May use 4 grams up to 4 times daily for lateral left hip pain   6. Further procedures recommended: none   1. Call me if you wish to try a left hip bursal injection  7. Acupuncture: none  8. Recommendations/follow-up for PCP:  See above  9. Release of information: none  10. Follow up: 8-12 weeks this can be video or in-person, your choice. Please call [phone] to make your follow-up appointment with me.                  Raisa URBINA, RN CNP, FNP  [facility]  [facility]

## 2022-01-14 NOTE — PATIENT INSTRUCTIONS
Plan:   1. Physical Therapy: not at present  2. I am in support of you joining [employer] at the gym  3. Clinical Health Psychologist to address issues of relaxation, behavioral change, coping style, and other factors important to improvement: none  4. Diagnostic Studies: none  5. Medication Management:   1. I am OK with you using oxycodone/acetminophen 1-2 tabs per day as you are.# 45/month. Fill/begin 1/14/2022 to last 30 days  2. Trial Voltaren gel 5% over-the-counter. May use 4 grams up to 4 times daily for lateral left hip pain   6. Further procedures recommended: none   1. Call me if you wish to try a left hip bursal injection  7. Acupuncture: none  8. Recommendations/follow-up for PCP:  See above  9. Release of information: none  10. Follow up: 8-12 weeks this can be video or in-person, your choice. Please call [phone] to make your follow-up appointment with me.     ----------------------------------------------------------------  Clinic Number:  [phone]     Call with any questions about your care and for scheduling assistance.     Calls are returned Monday through Friday between 8 AM and 4:30 PM. We usually get back to you within 2 business days depending on the issue/request.    If we are prescribing your medications:    For opioid medication refills, call the clinic or send a WebVet message 7 days in advance.  Please include:    Name of requested medication    Name of the pharmacy.    For non-opioid medications, call your pharmacy directly to request a refill. Please allow 3-4 days to be processed.     Per [place] State Law:    All controlled substance prescriptions must be filled within 30 days of being written.      For those controlled substances allowing refills, pickup must occur within 30 days of last fill.      We believe regular attendance is key to your success in our program!      Any time you are unable to keep your appointment we ask that you call us at least 24 hours in advance to  cancel.This will allow us to offer the appointment time to another patient.     Multiple missed appointments may lead to dismissal from the clinic.

## 2022-01-16 ENCOUNTER — MYC MEDICAL ADVICE (OUTPATIENT)
Dept: FAMILY MEDICINE | Facility: CLINIC | Age: 67
End: 2022-01-16
Payer: COMMERCIAL

## 2022-01-16 DIAGNOSIS — L71.0 DERMATITIS, PERIORAL: ICD-10-CM

## 2022-01-17 RX ORDER — MINOCYCLINE HYDROCHLORIDE 100 MG/1
100 CAPSULE ORAL 2 TIMES DAILY
Qty: 60 CAPSULE | Refills: 2 | Status: SHIPPED | OUTPATIENT
Start: 2022-01-17

## 2022-02-21 ENCOUNTER — LAB (OUTPATIENT)
Dept: URGENT CARE | Facility: URGENT CARE | Age: 67
End: 2022-02-21
Payer: COMMERCIAL

## 2022-02-21 ENCOUNTER — TELEPHONE (OUTPATIENT)
Dept: URGENT CARE | Facility: URGENT CARE | Age: 67
End: 2022-02-21

## 2022-02-21 DIAGNOSIS — Z20.822 SUSPECTED COVID-19 VIRUS INFECTION: ICD-10-CM

## 2022-02-21 PROCEDURE — U0003 INFECTIOUS AGENT DETECTION BY NUCLEIC ACID (DNA OR RNA); SEVERE ACUTE RESPIRATORY SYNDROME CORONAVIRUS 2 (SARS-COV-2) (CORONAVIRUS DISEASE [COVID-19]), AMPLIFIED PROBE TECHNIQUE, MAKING USE OF HIGH THROUGHPUT TECHNOLOGIES AS DESCRIBED BY CMS-2020-01-R: HCPCS

## 2022-02-21 PROCEDURE — U0005 INFEC AGEN DETEC AMPLI PROBE: HCPCS

## 2022-02-21 NOTE — TELEPHONE ENCOUNTER
Coronavirus (COVID-19) Notification     Reason for call  Patient requesting results     Lab Result    Lab test 2019-nCoV rRt-PCR in process     RN Recommendations/Instructions per [facility]  Continue to quarantine and follow the instructions given at your testing visit until you receive the results.     Please Contact your PCP clinic or return to the Emergency department if your:    Symptoms worsen or other concerning symptom's.     Patient informed that if test for COVID19 is POSITIVE,  you will receive a call typically within 48 hours from the test date (date lab collected).  If NEGATIVE result, you will receive a letter in the mail or [employer].      Ronna Dominguez LPN

## 2022-02-22 ENCOUNTER — TELEPHONE (OUTPATIENT)
Dept: NURSING | Facility: CLINIC | Age: 67
End: 2022-02-22
Payer: COMMERCIAL

## 2022-02-22 LAB — SARS-COV-2 RNA RESP QL NAA+PROBE: POSITIVE

## 2022-02-22 NOTE — TELEPHONE ENCOUNTER
Coronavirus (COVID-19) Notification    Caller Name (Patient, parent, daughter/son, grandparent, etc)  patient    Reason for call  Notify of Positive Coronavirus (COVID-19) lab results, assess symptoms,  review [facility] recommendations    Lab Result    Lab test:  2019-nCoV rRt-PCR or SARS-CoV-2 PCR    Oropharyngeal AND/OR nasopharyngeal swabs is POSITIVE for 2019-nCoV RNA/SARS-COV-2 PCR (COVID-19 virus)      Gather patient reported symptoms   Assessment   Current Symptoms at time of phone call, reported by patient: (if no symptoms, document: No symptoms] yes   Date of symptom(s) onset (if applicable) Sore throat, shortness of breath, headaches, body sweats, and cough- refused triage nurse     If at time of call, Patients symptoms have worsened, the Patient should contact 911 or have someone drive them to Emergency Dept promptly:      If Patient calling 911, inform 911 personal that you have tested positive for the Coronavirus (COVID-19).  Place mask on and await 911 to arrive.    If Emergency Dept, If possible, please have another adult drive you to the Emergency Dept but you need to wear mask when in contact with other people.      Treatment Options:   Patient classified as COVID treatment eligible by Epic high risk algorithm: Yes  Is the patient symptomatic at the time of result notification? Yes. Was the onset of symptoms within the last 5 days? Yes.   There are now oral medications available for the treatment of COVID-19.  Taking one of these medications within the first five days of symptoms (when people may not yet feel severely ill) has been shown to make people feel better, prevent them from getting sicker, and preventing hospitalization and death.   Does the patient agree to have a visit with a provider to discuss treatment options? No.  Reason patient declined:  Other: refused  You may be eligible to receive a new treatment with a monoclonal antibody for preventing hospitalization in patients at  high risk for complications from COVID-19.   This medication is still experimental and available on a limited basis; it is given through an IV and must be given at an infusion center. Please note that not all people who are eligible will receive the medication since it is in limited supply. Are you interested in being considered for this medication?   No.  Reason patient declined:  Other: refused   Started symptoms on 2/19/22    Review information with Patient    Your result was positive. This means you have COVID-19 (coronavirus).    How can I protect others?    These guidelines are for isolating before returning to work, school or .    If you DO have symptoms    Stay home and away from others     For at least 5 days after your symptoms started, AND    You are fever free for 24 hours (with no medicine that reduces fever), AND    Your other symptoms are better    Wear a mask for 10 full days anytime you are around others    If you DON'T have symptoms    Stay home and away from others for at least 5 days after your positive test    Wear a mask for 10 full days anytime you are around others    There may be different guidelines for healthcare facilities.  Please check with the specific sites before arriving.    If you have been told by a doctor that you were severely ill with COVID-19 or are immunocompromised, you should isolate for at least 10 days.    You should not go back to work until you meet the guidelines above for ending your home isolation. You don't need to be retested for COVID-19 before going back to work--studies show that you won't spread the virus if it's been at least 10 days since your symptoms started (or 20 days, if you have a weak immune system).    Employers, schools, and daycares: This is an official notice for this person's medical guidelines for returning in-person.  They must meet the above guidelines before going back to work, school or  in person.    You will receive a positive  COVID-19 letter via [email or web address] or the mail soon with additional self-care information (exception, no letters will be sent to presurgical/preprocedure patients).    Would you like me to review some of that information with you now?  Yes    How can I take care of myself?      Get lots of rest. Drink extra fluids (unless a doctor has told you not to).      Take Tylenol (acetaminophen) for fever or pain. If you have liver or kidney problems, ask your family doctor if it's okay to take Tylenol.     Take either:     650 mg (two 325 mg pills) every 4 to 6 hours, or     1,000 mg (two 500 mg pills) every 8 hours as needed.     Note: Do not take more than 3,000 mg in one day. Acetaminophen is found in many medicines (both prescribed and over-the-counter medicines). Read all labels to be sure you don't take too much.    For children, check the Tylenol bottle for the right dose (based on their age or weight).      If you have other health problems (like cancer, heart failure, an organ transplant or severe kidney disease): Call your specialty clinic if you don't feel better in the next 2 days.      Know when to call 911: Emergency warning signs include:    Trouble breathing or shortness of breath    Pain or pressure in the chest that doesn't go away    Feeling confused like you haven't felt before, or not being able to wake up    Bluish-colored lips or face        If you were tested for an upcoming procedure, please contact your provider for next steps.    Sarah Galarza

## 2022-02-26 ENCOUNTER — MYC MEDICAL ADVICE (OUTPATIENT)
Dept: PALLIATIVE MEDICINE | Facility: CLINIC | Age: 67
End: 2022-02-26
Payer: COMMERCIAL

## 2022-02-26 DIAGNOSIS — M54.50 CHRONIC LEFT-SIDED LOW BACK PAIN WITHOUT SCIATICA: ICD-10-CM

## 2022-02-26 DIAGNOSIS — G89.29 CHRONIC LEFT-SIDED LOW BACK PAIN WITHOUT SCIATICA: ICD-10-CM

## 2022-02-26 DIAGNOSIS — S32.028D OTHER CLOSED FRACTURE OF SECOND LUMBAR VERTEBRA WITH ROUTINE HEALING, SUBSEQUENT ENCOUNTER: ICD-10-CM

## 2022-02-26 DIAGNOSIS — M70.62 TROCHANTERIC BURSITIS OF LEFT HIP: ICD-10-CM

## 2022-03-01 NOTE — TELEPHONE ENCOUNTER
Medication refill information reviewed.     Last due:  Fill/Start 1/14/2022. 30 day script  Due date:  anytime      Prescriptions prepped for review.     Rubina RN-BSN  [facility]

## 2022-03-01 NOTE — TELEPHONE ENCOUNTER
Received call from patient requesting refill(s) of oxyCODONE-acetaminophen (PERCOCET) 5-325 MG tablet    Last dispensed from pharmacy on 01/16/22    Patient's last office/virtual visit by prescribing provider on 01/14/22  Next office/virtual appointment scheduled for : None    Last urine drug screen date 11/16/21  Current opioid agreement on file (completed within the last year) Yes Date of opioid agreement: 11/16/21    E-prescribe to pharmacy-[id number] - [place], [place] - [address]     Will route to nursing [place] for review and preparation of prescription(s).

## 2022-03-01 NOTE — TELEPHONE ENCOUNTER
Routed to the nursing pool and to the MA pool to process refill(s).    Rubina Montgomery, RN-BSN  [facility]

## 2022-03-02 RX ORDER — OXYCODONE AND ACETAMINOPHEN 5; 325 MG/1; MG/1
1 TABLET ORAL EVERY 6 HOURS PRN
Qty: 45 TABLET | Refills: 0 | Status: SHIPPED | OUTPATIENT
Start: 2022-03-02 | End: 2022-04-01

## 2022-03-02 NOTE — TELEPHONE ENCOUNTER
Spoke to patient, notified that prescription was sent to preferred pharmacy.    Able to fill  and start 03/02/22      Allyson Berumen MA  [facility] [place]

## 2022-03-08 ENCOUNTER — TELEPHONE (OUTPATIENT)
Dept: FAMILY MEDICINE | Facility: CLINIC | Age: 67
End: 2022-03-08
Payer: COMMERCIAL

## 2022-03-08 NOTE — PROGRESS NOTES
"  Assessment & Plan     Left foot pain    - XR Foot Left G/E 3 Views; Future    Closed nondisplaced fracture of second metatarsal bone of left foot, initial encounter  Refer to ortho/podiatry  - Orthopedic  Referral; Future    Closed nondisplaced fracture of third metatarsal bone of left foot, initial encounter    - Orthopedic  Referral; Future    Closed displaced fracture of fourth metatarsal bone of left foot, initial encounter    - Orthopedic  Referral; Future             BMI:   Estimated body mass index is 29.41 kg/m  as calculated from the following:    Height as of this encounter: 1.689 m (5' 6.5\").    Weight as of this encounter: 83.9 kg (185 lb).           No follow-ups on file.    Izabella Kemp MD  [facility]   Ludy is a 66 year old who presents for the following health issues  accompanied by her friend.    HPI     Fell and hurt left foot on Monday.   Painful and swelling, unable to walk on it.  Elevation, ice, rest  Twisted between 2 doorways  Pain more over the metarsals            Review of Systems   Constitutional, HEENT, cardiovascular, pulmonary, gi and gu systems are negative, except as otherwise noted.      Objective    BP (!) 155/78   Pulse 96   Temp 98.4  F (36.9  C) (Oral)   Resp 18   Ht 1.689 m (5' 6.5\")   Wt 83.9 kg (185 lb)   SpO2 95%   BMI 29.41 kg/m    Body mass index is 29.41 kg/m .  Physical Exam   GENERAL: healthy, alert and no distress  MS: pain to palpation, erythema and swelling over metatarsals of left foot. Toes are warm. Able to bend toes    Xray - Reviewed and interpreted by me.  Left foot; fractures of 2,3 and 4th metatarsals, 4th one slightly displaced            "

## 2022-03-08 NOTE — TELEPHONE ENCOUNTER
Pt called because she is trying to make appointment with PCP.  She fell last light and hurt her foot.  It is painful when she puts pressure on foot.    Is able to move foot and toes fine.    No skin discoloration. Foot is a little swollen.  Feels the pain at the top of her foot.    Wants to be seen in clinic.    No openings for today.    Scheduled visit for tomorrow.    Advised to be seen sooner in UC if pain or symptoms worsen.      Anna Quintanilla RN  [facility]

## 2022-03-09 ENCOUNTER — OFFICE VISIT (OUTPATIENT)
Dept: FAMILY MEDICINE | Facility: CLINIC | Age: 67
End: 2022-03-09
Payer: COMMERCIAL

## 2022-03-09 ENCOUNTER — ANCILLARY PROCEDURE (OUTPATIENT)
Dept: GENERAL RADIOLOGY | Facility: CLINIC | Age: 67
End: 2022-03-09
Attending: FAMILY MEDICINE
Payer: COMMERCIAL

## 2022-03-09 VITALS
OXYGEN SATURATION: 95 % | BODY MASS INDEX: 29.03 KG/M2 | HEART RATE: 96 BPM | RESPIRATION RATE: 18 BRPM | DIASTOLIC BLOOD PRESSURE: 78 MMHG | TEMPERATURE: 98.4 F | SYSTOLIC BLOOD PRESSURE: 155 MMHG | HEIGHT: 67 IN | WEIGHT: 185 LBS

## 2022-03-09 DIAGNOSIS — M79.672 LEFT FOOT PAIN: Primary | ICD-10-CM

## 2022-03-09 DIAGNOSIS — M79.672 LEFT FOOT PAIN: ICD-10-CM

## 2022-03-09 DIAGNOSIS — S92.342A CLOSED DISPLACED FRACTURE OF FOURTH METATARSAL BONE OF LEFT FOOT, INITIAL ENCOUNTER: ICD-10-CM

## 2022-03-09 DIAGNOSIS — S92.325A CLOSED NONDISPLACED FRACTURE OF SECOND METATARSAL BONE OF LEFT FOOT, INITIAL ENCOUNTER: ICD-10-CM

## 2022-03-09 DIAGNOSIS — S92.335A CLOSED NONDISPLACED FRACTURE OF THIRD METATARSAL BONE OF LEFT FOOT, INITIAL ENCOUNTER: ICD-10-CM

## 2022-03-09 PROCEDURE — 73630 X-RAY EXAM OF FOOT: CPT | Mod: LT | Performed by: RADIOLOGY

## 2022-03-09 PROCEDURE — 99214 OFFICE O/P EST MOD 30 MIN: CPT | Performed by: FAMILY MEDICINE

## 2022-03-09 ASSESSMENT — ASTHMA QUESTIONNAIRES: ACT_TOTALSCORE: 24

## 2022-03-09 ASSESSMENT — PAIN SCALES - GENERAL: PAINLEVEL: WORST PAIN (10)

## 2022-03-14 ENCOUNTER — OFFICE VISIT (OUTPATIENT)
Dept: PODIATRY | Facility: CLINIC | Age: 67
End: 2022-03-14
Attending: FAMILY MEDICINE
Payer: COMMERCIAL

## 2022-03-14 VITALS — BODY MASS INDEX: 29.73 KG/M2 | HEIGHT: 66 IN | WEIGHT: 185 LBS | RESPIRATION RATE: 16 BRPM

## 2022-03-14 DIAGNOSIS — S92.325A CLOSED NONDISPLACED FRACTURE OF SECOND METATARSAL BONE OF LEFT FOOT, INITIAL ENCOUNTER: ICD-10-CM

## 2022-03-14 DIAGNOSIS — S92.335A CLOSED NONDISPLACED FRACTURE OF THIRD METATARSAL BONE OF LEFT FOOT, INITIAL ENCOUNTER: ICD-10-CM

## 2022-03-14 DIAGNOSIS — S92.342A CLOSED DISPLACED FRACTURE OF FOURTH METATARSAL BONE OF LEFT FOOT, INITIAL ENCOUNTER: ICD-10-CM

## 2022-03-14 PROCEDURE — 99213 OFFICE O/P EST LOW 20 MIN: CPT | Mod: 57 | Performed by: PODIATRIST

## 2022-03-14 PROCEDURE — 28470 CLTX METATARSAL FX WO MNP EA: CPT | Mod: LT | Performed by: PODIATRIST

## 2022-03-14 NOTE — LETTER
3/14/2022         RE: Ludy Ramos  [address]  [place] [address]        Dear Colleague,    Thank you for referring your patient, Ludy Ramos, to the [facility]. Please see a copy of my visit note below.    Subjective:    Pt is seen today in consult from Dr. Kemp for left metatarsal fractures.  On March 8, 2021 patient fell and hurt her left foot.  Had swelling bruising and pain and not able to walk on this.  Was seen in clinic and instructed to wear a walking boot.  She has been wearing this and its helping.  Pain swelling slightly better.  Denies weakness or increased deformity.      ROS: See above         Allergies   Allergen Reactions     Butrans [Buprenorphine]      Felt angry, increased rib pain, trouble breathing and nausea     Gabapentin      Nausea, issues breathing     Lisinopril      angioedema       Current Outpatient Medications   Medication Sig Dispense Refill     albuterol (PROAIR HFA/PROVENTIL HFA/VENTOLIN HFA) 108 (90 Base) MCG/ACT inhaler Inhale 2 puffs into the lungs every 4 hours as needed for shortness of breath / dyspnea 17 g 3     ALPRAZolam (XANAX) 0.25 MG tablet Take 1 tablet (0.25 mg) by mouth 3 times daily as needed for anxiety (1 tablet 30-60 minutes before your flight) 6 tablet 0     BREO ELLIPTA 200-25 MCG/INH Inhaler Inhale 1 puff by mouth once daily 180 each 0     citalopram (CELEXA) 20 MG tablet Take 1 tablet (20 mg) by mouth daily 90 tablet 2     losartan-hydrochlorothiazide (HYZAAR) 50-12.5 MG tablet Take 1 tablet by mouth daily 90 tablet 3     minocycline (MINOCIN) 100 MG capsule Take 1 capsule (100 mg) by mouth 2 times daily 60 capsule 2     nystatin (MYCOSTATIN) 368387 UNIT/GM external cream Apply topically 2 times daily 30 g 4     oxyCODONE-acetaminophen (PERCOCET) 5-325 MG tablet Take 1 tablet by mouth every 6 hours as needed for severe pain Fill/begin 3/2/2022. 30 day script 45 tablet 0     RISEdronate (ACTONEL) 35 MG  tablet Take 1 tablet (35 mg) by mouth every 7 days 12 tablet 3     traZODone (DESYREL) 100 MG tablet TAKE 2 TABLETS BY MOUTH AT BEDTIME 180 tablet 2       Patient Active Problem List   Diagnosis     Insomnia     CARDIOVASCULAR SCREENING; LDL GOAL LESS THAN 130     Hypertension goal BP (blood pressure) < 140/90     Anxiety     Severe persistent asthma without complication     History of colonic polyps     Clavicle fracture     Closed displaced fracture of proximal phalanx of left great toe     Chest wall pain     Thoracic back pain     Advanced directives, counseling/discussion     Pulmonary nodules       Past Medical History:   Diagnosis Date     Generalized anxiety disorder      Hypertension goal BP (blood pressure) < 140/90      Insomnia      Intermittent asthma      Menopausal state      Osteopenia        Past Surgical History:   Procedure Laterality Date     COLONOSCOPY       COLONOSCOPY  5/20/2014    Procedure: COLONOSCOPY;  Surgeon: Jean Hastings MD;  Location: [facility]     COLONOSCOPY  5/20/2014    Procedure: COMBINED COLONOSCOPY, SINGLE BIOPSY/POLYPECTOMY BY BIOPSY;  Surgeon: Jean Hastings MD;  [facility]: [facility]     COLONOSCOPY N/A 5/5/2017    Procedure: COMBINED COLONOSCOPY, SINGLE OR MULTIPLE BIOPSY/POLYPECTOMY BY BIOPSY;;  Surgeon: Duane, William Charles, MD;  Location: [facility]     COLONOSCOPY WITH CO2 INSUFFLATION N/A 5/5/2017    Procedure: COLONOSCOPY WITH CO2 INSUFFLATION;  colonoscopy, History of colonic polyps  BMI 25.74  [facility], fax: [phone];  Surgeon: Duane, William Charles, MD;  Location: [facility] OR     COSMETIC SURGERY       FINGER SURGERY      Left 4th digit     HYSTERECTOMY, JONAS       SURGICAL HISTORY OF -       Right 2nd digit     TONSILLECTOMY         Family History   Problem Relation Age of Onset     Respiratory Mother         copd     Respiratory Father         empysema     Melanoma No family hx of        Social History     Tobacco Use     Smoking status:  "Former Smoker     Packs/day: 0.00     Years: 20.00     Pack years: 0.00     Start date: 1980     Quit date: 2001     Years since quittin.2     Smokeless tobacco: Never Used   Substance Use Topics     Alcohol use: Yes     Alcohol/week: 10.0 standard drinks         Exam:    Vitals: Resp 16   Ht 1.676 m (5' 6\")   Wt 83.9 kg (185 lb)   BMI 29.86 kg/m    BMI: Body mass index is 29.86 kg/m .  Height: 5' 6\"    Constitutional/ general:  Pt is in no apparent distress, appears well-nourished.  Cooperative with history and physical exam.     Psych:  The patient answered questions appropriately.  Normal affect.  Seems to have reasonable expectations, in terms of treatment.     Lungs:  Non labored breathing, non labored speech. No cough.  No audible wheezing. Even, quiet breathing.       Vascular:  positive pedal pulses bilaterally for both the DP and PT arteries.  CFT < 3 sec.  positive ankle edema.  positive pedal hair growth.    Neuro:  Alert and oriented x 3. Coordinated gait.  Light touch sensation is intact     Derm: Normal texture and turgor.  No erythema, ecchymosis, or cyanosis.      Musculoskeletal:    Lower extremity muscle strength is normal.  Patient is ambulatory without an assistive device or brace.  No gross deformities.  Normal arch.  Pain upon palpation left second third and fourth metatarsal necks.  Edema and slight ecchymosis noted.  No erythema.  Toes in good alignment.  No pain at the tarsometatarsal joint.  No pain on the fifth or first rays.    Radiographic Exam:  X-Ray Findings:  I personally reviewed the films.    LEFT FOOT THREE OR MORE VIEWS  3/9/2022 4:00 PM      HISTORY:  Left foot pain.     COMPARISON: Radiographs from 2015.                                                                      IMPRESSION:  1. There is a fracture in the distal metadiaphysis of the second,  third and fourth metatarsals. There is up to 3 mm of displacement.  There is a mild degree of apex medial " angulation at the fracture  sites.  2. The proximal phalanx great toe fracture has healed into near  anatomic position and alignment.  3. Mild osteoarthrosis of the first MTP joint.        Assessment: Left second third and fourth metatarsal necks fractures    Plan:  X-rays personally reviewed.  Discussed etiology and treatment options with the patient in detail.  Discussed we will treat conservatively.  Continue with removable walking boot.  Patient to wear this at all times while walking.  When not walking patient will take this off and do ROM to prevent blood clot and joint stiffness.  Patient will not sleep with this on.  If displacement or signs of slow healing pt may require surgery.  We will try to limit activities.  We wrapped this with an Ace bandage to the area and she will try to elevate this to decrease the edema.  F/U 2 wks for repeat x-ray.  Fracture care.  Thank you for allowing me participate in the care of this patient.        Kayden Bravo DPM, FACFAS        Again, thank you for allowing me to participate in the care of your patient.        Sincerely,        Kayden Bravo DPM

## 2022-03-14 NOTE — PATIENT INSTRUCTIONS
We wish you continued good healing. If you have any questions or concerns, please do not hesitate to contact us at  [phone]    [facility] (secure e-mail communication and access to your chart) to send a message or to make an appointment.    Please remember to call and schedule a follow up appointment if one was recommended at your earliest convenience.     PODIATRY CLINIC HOURS  TELEPHONE NUMBER    Dr. Kayden IRWINPLEILANI [facility]        Clinics:  Luis Angel Reyes CMA   Tuesday 1PM-6PM  [place]  Wednesday 745AM-330PM  [place]  Thursday/Friday 745AM-230PM  [facility] SIMON/[facility] APPOINTMENTS  [phone]    [place] APPOINTMENTS  [phone]          If you need a medication refill, please contact us you may need lab work and/or a follow up visit prior to your refill (i.e. Antifungal medications).    If MRI needed please call Imaging at [phone] or [phone]    HOW DO I GET MY KNEE SCOOTER? Knee scooters can be picked up at ANY Medical Supply stores with your knee scooter Prescription.  OR    Bring your signed prescription to an [facility] showroom.

## 2022-03-15 NOTE — PROGRESS NOTES
Subjective:    Pt is seen today in consult from Dr. Kemp for left metatarsal fractures.  On March 8, 2021 patient fell and hurt her left foot.  Had swelling bruising and pain and not able to walk on this.  Was seen in clinic and instructed to wear a walking boot.  She has been wearing this and its helping.  Pain swelling slightly better.  Denies weakness or increased deformity.      ROS: See above         Allergies   Allergen Reactions     Butrans [Buprenorphine]      Felt angry, increased rib pain, trouble breathing and nausea     Gabapentin      Nausea, issues breathing     Lisinopril      angioedema       Current Outpatient Medications   Medication Sig Dispense Refill     albuterol (PROAIR HFA/PROVENTIL HFA/VENTOLIN HFA) 108 (90 Base) MCG/ACT inhaler Inhale 2 puffs into the lungs every 4 hours as needed for shortness of breath / dyspnea 17 g 3     ALPRAZolam (XANAX) 0.25 MG tablet Take 1 tablet (0.25 mg) by mouth 3 times daily as needed for anxiety (1 tablet 30-60 minutes before your flight) 6 tablet 0     BREO ELLIPTA 200-25 MCG/INH Inhaler Inhale 1 puff by mouth once daily 180 each 0     citalopram (CELEXA) 20 MG tablet Take 1 tablet (20 mg) by mouth daily 90 tablet 2     losartan-hydrochlorothiazide (HYZAAR) 50-12.5 MG tablet Take 1 tablet by mouth daily 90 tablet 3     minocycline (MINOCIN) 100 MG capsule Take 1 capsule (100 mg) by mouth 2 times daily 60 capsule 2     nystatin (MYCOSTATIN) 637968 UNIT/GM external cream Apply topically 2 times daily 30 g 4     oxyCODONE-acetaminophen (PERCOCET) 5-325 MG tablet Take 1 tablet by mouth every 6 hours as needed for severe pain Fill/begin 3/2/2022. 30 day script 45 tablet 0     RISEdronate (ACTONEL) 35 MG tablet Take 1 tablet (35 mg) by mouth every 7 days 12 tablet 3     traZODone (DESYREL) 100 MG tablet TAKE 2 TABLETS BY MOUTH AT BEDTIME 180 tablet 2       Patient Active Problem List   Diagnosis     Insomnia     CARDIOVASCULAR SCREENING; LDL GOAL LESS  THAN 130     Hypertension goal BP (blood pressure) < 140/90     Anxiety     Severe persistent asthma without complication     History of colonic polyps     Clavicle fracture     Closed displaced fracture of proximal phalanx of left great toe     Chest wall pain     Thoracic back pain     Advanced directives, counseling/discussion     Pulmonary nodules       Past Medical History:   Diagnosis Date     Generalized anxiety disorder      Hypertension goal BP (blood pressure) < 140/90      Insomnia      Intermittent asthma      Menopausal state      Osteopenia        Past Surgical History:   Procedure Laterality Date     COLONOSCOPY       COLONOSCOPY  2014    Procedure: COLONOSCOPY;  Surgeon: Jean Hastings MD;  [facility]: [facility]     COLONOSCOPY  2014    Procedure: COMBINED COLONOSCOPY, SINGLE BIOPSY/POLYPECTOMY BY BIOPSY;  Surgeon: Jean Hastings MD;  [facility]: [facility]     COLONOSCOPY N/A 2017    Procedure: COMBINED COLONOSCOPY, SINGLE OR MULTIPLE BIOPSY/POLYPECTOMY BY BIOPSY;;  Surgeon: Duane, William Charles, MD;  Location: [facility]     COLONOSCOPY WITH CO2 INSUFFLATION N/A 2017    Procedure: COLONOSCOPY WITH CO2 INSUFFLATION;  colonoscopy, History of colonic polyps  BMI 25.74  [facility], fax: [phone];  Surgeon: Duane, William Charles, MD;  Location: [facility]     COSMETIC SURGERY       FINGER SURGERY      Left 4th digit     HYSTERECTOMY, JONAS       SURGICAL HISTORY OF -       Right 2nd digit     TONSILLECTOMY         Family History   Problem Relation Age of Onset     Respiratory Mother         copd     Respiratory Father         empysema     Melanoma No family hx of        Social History     Tobacco Use     Smoking status: Former Smoker     Packs/day: 0.00     Years: 20.00     Pack years: 0.00     Start date: 1980     Quit date: 2001     Years since quittin.2     Smokeless tobacco: Never Used   Substance Use Topics     Alcohol use: Yes     Alcohol/week: 10.0  "standard drinks         Exam:    Vitals: Resp 16   Ht 1.676 m (5' 6\")   Wt 83.9 kg (185 lb)   BMI 29.86 kg/m    BMI: Body mass index is 29.86 kg/m .  Height: 5' 6\"    Constitutional/ general:  Pt is in no apparent distress, appears well-nourished.  Cooperative with history and physical exam.     Psych:  The patient answered questions appropriately.  Normal affect.  Seems to have reasonable expectations, in terms of treatment.     Lungs:  Non labored breathing, non labored speech. No cough.  No audible wheezing. Even, quiet breathing.       Vascular:  positive pedal pulses bilaterally for both the DP and PT arteries.  CFT < 3 sec.  positive ankle edema.  positive pedal hair growth.    Neuro:  Alert and oriented x 3. Coordinated gait.  Light touch sensation is intact     Derm: Normal texture and turgor.  No erythema, ecchymosis, or cyanosis.      Musculoskeletal:    Lower extremity muscle strength is normal.  Patient is ambulatory without an assistive device or brace.  No gross deformities.  Normal arch.  Pain upon palpation left second third and fourth metatarsal necks.  Edema and slight ecchymosis noted.  No erythema.  Toes in good alignment.  No pain at the tarsometatarsal joint.  No pain on the fifth or first rays.    Radiographic Exam:  X-Ray Findings:  I personally reviewed the films.    LEFT FOOT THREE OR MORE VIEWS  3/9/2022 4:00 PM      HISTORY:  Left foot pain.     COMPARISON: Radiographs from 2/2/2015.                                                                      IMPRESSION:  1. There is a fracture in the distal metadiaphysis of the second,  third and fourth metatarsals. There is up to 3 mm of displacement.  There is a mild degree of apex medial angulation at the fracture  sites.  2. The proximal phalanx great toe fracture has healed into near  anatomic position and alignment.  3. Mild osteoarthrosis of the first MTP joint.        Assessment: Left second third and fourth metatarsal necks " fractures    Plan:  X-rays personally reviewed.  Discussed etiology and treatment options with the patient in detail.  Discussed we will treat conservatively.  Continue with removable walking boot.  Patient to wear this at all times while walking.  When not walking patient will take this off and do ROM to prevent blood clot and joint stiffness.  Patient will not sleep with this on.  If displacement or signs of slow healing pt may require surgery.  We will try to limit activities.  We wrapped this with an Ace bandage to the area and she will try to elevate this to decrease the edema.  F/U 2 wks for repeat x-ray.  Fracture care.  Thank you for allowing me participate in the care of this patient.        Kayden Bravo, MARJORIE, FACFAS

## 2022-03-24 ENCOUNTER — OFFICE VISIT (OUTPATIENT)
Dept: PODIATRY | Facility: CLINIC | Age: 67
End: 2022-03-24
Payer: COMMERCIAL

## 2022-03-24 ENCOUNTER — ANCILLARY PROCEDURE (OUTPATIENT)
Dept: GENERAL RADIOLOGY | Facility: CLINIC | Age: 67
End: 2022-03-24
Attending: PODIATRIST
Payer: COMMERCIAL

## 2022-03-24 VITALS
DIASTOLIC BLOOD PRESSURE: 78 MMHG | BODY MASS INDEX: 29.86 KG/M2 | WEIGHT: 185 LBS | SYSTOLIC BLOOD PRESSURE: 148 MMHG | HEART RATE: 74 BPM

## 2022-03-24 DIAGNOSIS — S92.325A CLOSED NONDISPLACED FRACTURE OF SECOND METATARSAL BONE OF LEFT FOOT, INITIAL ENCOUNTER: Primary | ICD-10-CM

## 2022-03-24 DIAGNOSIS — S92.335A CLOSED NONDISPLACED FRACTURE OF THIRD METATARSAL BONE OF LEFT FOOT, INITIAL ENCOUNTER: ICD-10-CM

## 2022-03-24 DIAGNOSIS — S92.342A CLOSED DISPLACED FRACTURE OF FOURTH METATARSAL BONE OF LEFT FOOT, INITIAL ENCOUNTER: ICD-10-CM

## 2022-03-24 DIAGNOSIS — S92.325A CLOSED NONDISPLACED FRACTURE OF SECOND METATARSAL BONE OF LEFT FOOT, INITIAL ENCOUNTER: ICD-10-CM

## 2022-03-24 PROCEDURE — 73630 X-RAY EXAM OF FOOT: CPT | Mod: LT | Performed by: RADIOLOGY

## 2022-03-24 PROCEDURE — 99207 PR FRACTURE CARE IN GLOBAL PERIOD: CPT | Performed by: PODIATRIST

## 2022-03-24 NOTE — LETTER
3/24/2022         RE: Ludy Ramos  [address]  [place] [address]        Dear Colleague,    Thank you for referring your patient, Ludy Ramos, to the [facility]. Please see a copy of my visit note below.    Subjective:    3/14/22   Pt is seen today in consult from Dr. Kemp for left metatarsal fractures.  On March 8, 2022 patient fell and hurt her left foot.  Had swelling bruising and pain and not able to walk on this.  Was seen in clinic and instructed to wear a walking boot.  She has been wearing this and its helping.  Pain swelling slightly better.  Denies weakness or increased deformity.    3/24/22 patient is 2 weeks 2 days status post left second third fourth metatarsal neck fractures on March 8, 2022.   Patient states swelling decreased.  Still has pain when walking.  Sadly her  passed away March 17, 2022.  Patient has been walking more because of this.      ROS: See above         Allergies   Allergen Reactions     Butrans [Buprenorphine]      Felt angry, increased rib pain, trouble breathing and nausea     Gabapentin      Nausea, issues breathing     Lisinopril      angioedema       Current Outpatient Medications   Medication Sig Dispense Refill     albuterol (PROAIR HFA/PROVENTIL HFA/VENTOLIN HFA) 108 (90 Base) MCG/ACT inhaler Inhale 2 puffs into the lungs every 4 hours as needed for shortness of breath / dyspnea 17 g 3     ALPRAZolam (XANAX) 0.25 MG tablet Take 1 tablet (0.25 mg) by mouth 3 times daily as needed for anxiety (1 tablet 30-60 minutes before your flight) 6 tablet 0     BREO ELLIPTA 200-25 MCG/INH Inhaler Inhale 1 puff by mouth once daily 180 each 0     citalopram (CELEXA) 20 MG tablet Take 1 tablet (20 mg) by mouth daily 90 tablet 2     losartan-hydrochlorothiazide (HYZAAR) 50-12.5 MG tablet Take 1 tablet by mouth daily 90 tablet 3     minocycline (MINOCIN) 100 MG capsule Take 1 capsule (100 mg) by mouth 2 times daily 60 capsule 2      nystatin (MYCOSTATIN) 962715 UNIT/GM external cream Apply topically 2 times daily 30 g 4     oxyCODONE-acetaminophen (PERCOCET) 5-325 MG tablet Take 1 tablet by mouth every 6 hours as needed for severe pain Fill/begin 3/2/2022. 30 day script 45 tablet 0     RISEdronate (ACTONEL) 35 MG tablet Take 1 tablet (35 mg) by mouth every 7 days 12 tablet 3     traZODone (DESYREL) 100 MG tablet TAKE 2 TABLETS BY MOUTH AT BEDTIME 180 tablet 2       Patient Active Problem List   Diagnosis     Insomnia     CARDIOVASCULAR SCREENING; LDL GOAL LESS THAN 130     Hypertension goal BP (blood pressure) < 140/90     Anxiety     Severe persistent asthma without complication     History of colonic polyps     Clavicle fracture     Closed displaced fracture of proximal phalanx of left great toe     Chest wall pain     Thoracic back pain     Advanced directives, counseling/discussion     Pulmonary nodules       Past Medical History:   Diagnosis Date     Generalized anxiety disorder      Hypertension goal BP (blood pressure) < 140/90      Insomnia      Intermittent asthma      Menopausal state      Osteopenia        Past Surgical History:   Procedure Laterality Date     COLONOSCOPY       COLONOSCOPY  5/20/2014    Procedure: COLONOSCOPY;  Surgeon: Jean Hastings MD;  [facility]: [facility]     COLONOSCOPY  5/20/2014    Procedure: COMBINED COLONOSCOPY, SINGLE BIOPSY/POLYPECTOMY BY BIOPSY;  Surgeon: Jean Hastings MD;  [facility]     COLONOSCOPY N/A 5/5/2017    Procedure: COMBINED COLONOSCOPY, SINGLE OR MULTIPLE BIOPSY/POLYPECTOMY BY BIOPSY;;  Surgeon: Duane, William Charles, MD;  [facility]: [facility]     COLONOSCOPY WITH CO2 INSUFFLATION N/A 5/5/2017    Procedure: COLONOSCOPY WITH CO2 INSUFFLATION;  colonoscopy, History of colonic polyps  BMI 25.74  [facility], fax: [phone];  Surgeon: Duane, William Charles, MD;  Location: [facility]     COSMETIC SURGERY       FINGER SURGERY      Left 4th digit     HYSTERECTOMY, JONAS        SURGICAL HISTORY OF -       Right 2nd digit     TONSILLECTOMY         Family History   Problem Relation Age of Onset     Respiratory Mother         copd     Respiratory Father         empysema     Melanoma No family hx of        Social History     Tobacco Use     Smoking status: Former Smoker     Packs/day: 0.00     Years: 20.00     Pack years: 0.00     Start date: 1980     Quit date: 2001     Years since quittin.3     Smokeless tobacco: Never Used   Substance Use Topics     Alcohol use: Yes     Alcohol/week: 10.0 standard drinks         Exam:    Vitals: There were no vitals taken for this visit.  BMI: There is no height or weight on file to calculate BMI.  Height: Data Unavailable    Constitutional/ general:  Pt is in no apparent distress, appears well-nourished.  Cooperative with history and physical exam.     Psych:  The patient answered questions appropriately.  Normal affect.  Seems to have reasonable expectations, in terms of treatment.     Lungs:  Non labored breathing, non labored speech. No cough.  No audible wheezing. Even, quiet breathing.       Vascular:  positive pedal pulses bilaterally for both the DP and PT arteries.  CFT < 3 sec.  positive ankle edema.  positive pedal hair growth.    Neuro:  Alert and oriented x 3. Coordinated gait.  Light touch sensation is intact     Derm: Normal texture and turgor.  No erythema, ecchymosis, or cyanosis.      Musculoskeletal:    Lower extremity muscle strength is normal.  Patient is ambulatory without an assistive device or brace.  No gross deformities.  Normal arch.  Pain upon palpation left second third and fourth metatarsal necks.  No ecchymosis.  Edema present but decreased.  No erythema.  Toes in good alignment.  No pain at the tarsometatarsal joint.  No pain on the fifth or first rays.    Radiographic Exam: X-rays show perhaps a slight increase in lateral deviation of metatarsal heads.    Assessment: Left second third and fourth  metatarsal necks fractures    Plan:  X-rays taken today of left foot.  Discussed offloading metatarsal heads more.  We gave her a plantarflexed Cam walker.  Again when not walking will take it off and do range of motion ankle massage calf to prevent blood clot.  She will try to minimize her activities.  Return to the clinic in 3 weeks.  We gave her sympathies regarding the death of her .  Fracture care.        Kyaden Bravo DPM, FACFAS        Again, thank you for allowing me to participate in the care of your patient.        Sincerely,        Kayden Bravo DPM

## 2022-03-24 NOTE — PROGRESS NOTES
Subjective:    3/14/22   Pt is seen today in consult from Dr. Kemp for left metatarsal fractures.  On March 8, 2022 patient fell and hurt her left foot.  Had swelling bruising and pain and not able to walk on this.  Was seen in clinic and instructed to wear a walking boot.  She has been wearing this and its helping.  Pain swelling slightly better.  Denies weakness or increased deformity.    3/24/22 patient is 2 weeks 2 days status post left second third fourth metatarsal neck fractures on March 8, 2022.   Patient states swelling decreased.  Still has pain when walking.  Sadly her  passed away March 17, 2022.  Patient has been walking more because of this.      ROS: See above         Allergies   Allergen Reactions     Butrans [Buprenorphine]      Felt angry, increased rib pain, trouble breathing and nausea     Gabapentin      Nausea, issues breathing     Lisinopril      angioedema       Current Outpatient Medications   Medication Sig Dispense Refill     albuterol (PROAIR HFA/PROVENTIL HFA/VENTOLIN HFA) 108 (90 Base) MCG/ACT inhaler Inhale 2 puffs into the lungs every 4 hours as needed for shortness of breath / dyspnea 17 g 3     ALPRAZolam (XANAX) 0.25 MG tablet Take 1 tablet (0.25 mg) by mouth 3 times daily as needed for anxiety (1 tablet 30-60 minutes before your flight) 6 tablet 0     BREO ELLIPTA 200-25 MCG/INH Inhaler Inhale 1 puff by mouth once daily 180 each 0     citalopram (CELEXA) 20 MG tablet Take 1 tablet (20 mg) by mouth daily 90 tablet 2     losartan-hydrochlorothiazide (HYZAAR) 50-12.5 MG tablet Take 1 tablet by mouth daily 90 tablet 3     minocycline (MINOCIN) 100 MG capsule Take 1 capsule (100 mg) by mouth 2 times daily 60 capsule 2     nystatin (MYCOSTATIN) 052201 UNIT/GM external cream Apply topically 2 times daily 30 g 4     oxyCODONE-acetaminophen (PERCOCET) 5-325 MG tablet Take 1 tablet by mouth every 6 hours as needed for severe pain Fill/begin 3/2/2022. 30 day script 45 tablet  0     RISEdronate (ACTONEL) 35 MG tablet Take 1 tablet (35 mg) by mouth every 7 days 12 tablet 3     traZODone (DESYREL) 100 MG tablet TAKE 2 TABLETS BY MOUTH AT BEDTIME 180 tablet 2       Patient Active Problem List   Diagnosis     Insomnia     CARDIOVASCULAR SCREENING; LDL GOAL LESS THAN 130     Hypertension goal BP (blood pressure) < 140/90     Anxiety     Severe persistent asthma without complication     History of colonic polyps     Clavicle fracture     Closed displaced fracture of proximal phalanx of left great toe     Chest wall pain     Thoracic back pain     Advanced directives, counseling/discussion     Pulmonary nodules       Past Medical History:   Diagnosis Date     Generalized anxiety disorder      Hypertension goal BP (blood pressure) < 140/90      Insomnia      Intermittent asthma      Menopausal state      Osteopenia        Past Surgical History:   Procedure Laterality Date     COLONOSCOPY       COLONOSCOPY  5/20/2014    Procedure: COLONOSCOPY;  Surgeon: Jean Hastings MD;  Location: [facility]     COLONOSCOPY  5/20/2014    Procedure: COMBINED COLONOSCOPY, SINGLE BIOPSY/POLYPECTOMY BY BIOPSY;  Surgeon: Jean Hastings MD;  Location: [facility]     COLONOSCOPY N/A 5/5/2017    Procedure: COMBINED COLONOSCOPY, SINGLE OR MULTIPLE BIOPSY/POLYPECTOMY BY BIOPSY;;  Surgeon: Duane, William Charles, MD;  Location: [facility]     COLONOSCOPY WITH CO2 INSUFFLATION N/A 5/5/2017    Procedure: COLONOSCOPY WITH CO2 INSUFFLATION;  colonoscopy, History of colonic polyps  BMI 25.74  [facility] [employer], fax: [phone];  Surgeon: Duane, William Charles, MD;  Location: [facility] OR     COSMETIC SURGERY       FINGER SURGERY      Left 4th digit     HYSTERECTOMY, JONAS       SURGICAL HISTORY OF -       Right 2nd digit     TONSILLECTOMY         Family History   Problem Relation Age of Onset     Respiratory Mother         copd     Respiratory Father         empysema     Melanoma No family hx of        Social History      Tobacco Use     Smoking status: Former Smoker     Packs/day: 0.00     Years: 20.00     Pack years: 0.00     Start date: 1980     Quit date: 2001     Years since quittin.3     Smokeless tobacco: Never Used   Substance Use Topics     Alcohol use: Yes     Alcohol/week: 10.0 standard drinks         Exam:    Vitals: There were no vitals taken for this visit.  BMI: There is no height or weight on file to calculate BMI.  Height: Data Unavailable    Constitutional/ general:  Pt is in no apparent distress, appears well-nourished.  Cooperative with history and physical exam.     Psych:  The patient answered questions appropriately.  Normal affect.  Seems to have reasonable expectations, in terms of treatment.     Lungs:  Non labored breathing, non labored speech. No cough.  No audible wheezing. Even, quiet breathing.       Vascular:  positive pedal pulses bilaterally for both the DP and PT arteries.  CFT < 3 sec.  positive ankle edema.  positive pedal hair growth.    Neuro:  Alert and oriented x 3. Coordinated gait.  Light touch sensation is intact     Derm: Normal texture and turgor.  No erythema, ecchymosis, or cyanosis.      Musculoskeletal:    Lower extremity muscle strength is normal.  Patient is ambulatory without an assistive device or brace.  No gross deformities.  Normal arch.  Pain upon palpation left second third and fourth metatarsal necks.  No ecchymosis.  Edema present but decreased.  No erythema.  Toes in good alignment.  No pain at the tarsometatarsal joint.  No pain on the fifth or first rays.    Radiographic Exam: X-rays show perhaps a slight increase in lateral deviation of metatarsal heads.    Assessment: Left second third and fourth metatarsal necks fractures    Plan:  X-rays taken today of left foot.  Discussed offloading metatarsal heads more.  We gave her a plantarflexed Cam walker.  Again when not walking will take it off and do range of motion ankle massage calf to prevent blood  clot.  She will try to minimize her activities.  Return to the clinic in 3 weeks.  We gave her sympathies regarding the death of her .  Fracture care.        Kayden Bravo DPM, FACFAS

## 2022-03-24 NOTE — PATIENT INSTRUCTIONS
We wish you continued good healing. If you have any questions or concerns, please do not hesitate to contact us at  [phone]    [email or web address] (secure e-mail communication and access to your chart) to send a message or to make an appointment.    Please remember to call and schedule a follow up appointment if one was recommended at your earliest convenience.     PODIATRY CLINIC HOURS  TELEPHONE NUMBER    Dr. [facility]den [facility]        Clinics:  [facility] Reyes CMA   Tuesday 1PM-6PM  [place]  Wednesday 745AM-330PM  [place]  Thursday/Friday 745AM-230PM  [facility] APPOINTMENTS  [phone]    [place] APPOINTMENTS  [phone]          If you need a medication refill, please contact us you may need lab work and/or a follow up visit prior to your refill (i.e. Antifungal medications).    If MRI needed please call Imaging at [phone] or [phone]    HOW DO I GET MY KNEE SCOOTER? Knee scooters can be picked up at ANY Medical Supply stores with your knee scooter Prescription.  OR    Bring your signed prescription to an [facility] Medical Equipment showroom.

## 2022-03-31 DIAGNOSIS — M80.00XS AGE-RELATED OSTEOPOROSIS WITH CURRENT PATHOLOGICAL FRACTURE, SEQUELA: ICD-10-CM

## 2022-03-31 RX ORDER — RISEDRONATE SODIUM 35 MG/1
35 TABLET, FILM COATED ORAL
Qty: 12 TABLET | Refills: 1 | Status: SHIPPED | OUTPATIENT
Start: 2022-03-31 | End: 2022-09-06

## 2022-04-01 ENCOUNTER — MYC REFILL (OUTPATIENT)
Dept: PALLIATIVE MEDICINE | Facility: CLINIC | Age: 67
End: 2022-04-01
Payer: COMMERCIAL

## 2022-04-01 ENCOUNTER — MYC REFILL (OUTPATIENT)
Dept: FAMILY MEDICINE | Facility: CLINIC | Age: 67
End: 2022-04-01
Payer: COMMERCIAL

## 2022-04-01 DIAGNOSIS — M54.50 CHRONIC LEFT-SIDED LOW BACK PAIN WITHOUT SCIATICA: ICD-10-CM

## 2022-04-01 DIAGNOSIS — M70.62 TROCHANTERIC BURSITIS OF LEFT HIP: ICD-10-CM

## 2022-04-01 DIAGNOSIS — J45.40 MODERATE PERSISTENT ASTHMA, UNCOMPLICATED: ICD-10-CM

## 2022-04-01 DIAGNOSIS — S32.028D OTHER CLOSED FRACTURE OF SECOND LUMBAR VERTEBRA WITH ROUTINE HEALING, SUBSEQUENT ENCOUNTER: ICD-10-CM

## 2022-04-01 DIAGNOSIS — G89.29 CHRONIC LEFT-SIDED LOW BACK PAIN WITHOUT SCIATICA: ICD-10-CM

## 2022-04-04 RX ORDER — OXYCODONE AND ACETAMINOPHEN 5; 325 MG/1; MG/1
1 TABLET ORAL EVERY 6 HOURS PRN
Qty: 45 TABLET | Refills: 0 | Status: SHIPPED | OUTPATIENT
Start: 2022-04-04 | End: 2022-04-29

## 2022-04-04 NOTE — TELEPHONE ENCOUNTER
Medication refill information reviewed.     Due date for oxyCODONE-acetaminophen (PERCOCET) 5-325 MG tablet      Last dispensed from pharmacy on 03/02/22 is 4/4/22     Prescriptions prepped for review.     Will route to provider.       Lynsey Almaguer RN, BSN, CMSRN  RN Care Coordinator  [facility]

## 2022-04-04 NOTE — TELEPHONE ENCOUNTER
"Prescription approved per [facility] Refill Protocol.    Requested Prescriptions   Pending Prescriptions Disp Refills     fluticasone-vilanterol (BREO ELLIPTA) 200-25 MCG/INH inhaler 180 each 0       Inhaled Steroids Protocol Passed - 4/1/2022  6:09 PM        Passed - Patient is age 12 or older        Passed - Asthma control assessment score within normal limits in last 6 months     Please review ACT score.           Passed - Medication is active on med list        Passed - Recent (6 mo) or future (30 days) visit within the authorizing provider's specialty     Patient had office visit in the last 6 months or has a visit in the next 30 days with authorizing provider or within the authorizing provider's specialty.  See \"Patient Info\" tab in inbasket, or \"Choose Columns\" in Meds & Orders section of the refill encounter.               ACT Total Scores 2/25/2021 8/18/2021 3/9/2022   ACT TOTAL SCORE - - -   ASTHMA ER VISITS - - -   ASTHMA HOSPITALIZATIONS - - -   ACT TOTAL SCORE (Goal Greater than or Equal to 20) 20 16 24   In the past 12 months, how many times did you visit the emergency room for your asthma without being admitted to the hospital? 0 0 0   In the past 12 months, how many times were you hospitalized overnight because of your asthma? 0 0 0     ZIA AdkinsN, RN    "

## 2022-04-04 NOTE — TELEPHONE ENCOUNTER
Signed Prescriptions:                        Disp   Refills    oxyCODONE-acetaminophen (PERCOCET) 5-325 M*45 tab*0        Sig: Take 1 tablet by mouth every 6 hours as needed for           severe pain Fill/begin 4/4/2022. 30 day script  Authorizing Provider: RAISA ARRIAGA    Reviewed [place]  April 4, 2022- no concerning fills.    Raisa URBINA, RN CNP, FNP  [facility]  [facility]

## 2022-04-04 NOTE — TELEPHONE ENCOUNTER
[facility] message sent with Rx approval from provider.  Cyril  Pain Clinic Management Team

## 2022-04-04 NOTE — TELEPHONE ENCOUNTER
Received call from patient requesting refill(s) of oxyCODONE-acetaminophen (PERCOCET) 5-325 MG tablet     Last dispensed from pharmacy on 03/02/22    Patient's last office/virtual visit by prescribing provider on 01/14/22    Next office/virtual appointment scheduled for None  04/13/22 Cancelled by clinician     Last urine drug screen date 11/16/21  Current opioid agreement on file (completed within the last year) Yes Date of opioid agreement: 11/16/21    E-prescribe to   [id number] - [place] - [address]  [address] [address]  Phone: [phone] Fax: [phone]    Will route to nursing pool for review and preparation of prescription(s).     Bisi Taylor [facility]

## 2022-04-04 NOTE — TELEPHONE ENCOUNTER
Refills have been requested for the following medications:         oxyCODONE-acetaminophen (PERCOCET) 5-325 MG tablet [Raisa Peña, CARLITOS CNP]       Patient Comment: Could you please refill?     Preferred pharmacy: [id number] - [place], [place] - [address]

## 2022-04-14 ENCOUNTER — OFFICE VISIT (OUTPATIENT)
Dept: PODIATRY | Facility: CLINIC | Age: 67
End: 2022-04-14
Payer: COMMERCIAL

## 2022-04-14 DIAGNOSIS — S92.342A CLOSED DISPLACED FRACTURE OF FOURTH METATARSAL BONE OF LEFT FOOT, INITIAL ENCOUNTER: ICD-10-CM

## 2022-04-14 DIAGNOSIS — S92.325A CLOSED NONDISPLACED FRACTURE OF SECOND METATARSAL BONE OF LEFT FOOT, INITIAL ENCOUNTER: Primary | ICD-10-CM

## 2022-04-14 DIAGNOSIS — S92.335A CLOSED NONDISPLACED FRACTURE OF THIRD METATARSAL BONE OF LEFT FOOT, INITIAL ENCOUNTER: ICD-10-CM

## 2022-04-14 PROCEDURE — 99207 PR FRACTURE CARE IN GLOBAL PERIOD: CPT | Performed by: PODIATRIST

## 2022-04-14 NOTE — LETTER
4/14/2022         RE: Ludy Ramos  [address]  [place] [address]        Dear Colleague,    Thank you for referring your patient, Ludy Ramos, to the [facility]. Please see a copy of my visit note below.    Subjective:    3/14/22   Pt is seen today in consult from Dr. Kemp for left metatarsal fractures.  On March 8, 2022 patient fell and hurt her left foot.  Had swelling bruising and pain and not able to walk on this.  Was seen in clinic and instructed to wear a walking boot.  She has been wearing this and its helping.  Pain swelling slightly better.  Denies weakness or increased deformity.    3/24/22 patient is 2 weeks 2 days status post left second third fourth metatarsal neck fractures on March 8, 2022.   Patient states swelling decreased.  Still has pain when walking.  Sadly her  passed away March 17, 2022.  Patient has been walking more because of this.    4/14/22   patient is 5 weeks 2 days status post left second third fourth metatarsal neck fractures on March eighth 2022.  States swelling and pain decreasing.  Recently tried walking and shoe around house and swelling and pain returned.  Has no other new complaints.      ROS: See above         Allergies   Allergen Reactions     Butrans [Buprenorphine]      Felt angry, increased rib pain, trouble breathing and nausea     Gabapentin      Nausea, issues breathing     Lisinopril      angioedema       Current Outpatient Medications   Medication Sig Dispense Refill     albuterol (PROAIR HFA/PROVENTIL HFA/VENTOLIN HFA) 108 (90 Base) MCG/ACT inhaler Inhale 2 puffs into the lungs every 4 hours as needed for shortness of breath / dyspnea 17 g 3     ALPRAZolam (XANAX) 0.25 MG tablet Take 1 tablet (0.25 mg) by mouth 3 times daily as needed for anxiety (1 tablet 30-60 minutes before your flight) 6 tablet 0     citalopram (CELEXA) 20 MG tablet Take 1 tablet (20 mg) by mouth daily 90 tablet 2      fluticasone-vilanterol (BREO ELLIPTA) 200-25 MCG/INH inhaler Inhale 1 puff by mouth once daily 180 each 0     losartan-hydrochlorothiazide (HYZAAR) 50-12.5 MG tablet Take 1 tablet by mouth daily 90 tablet 3     minocycline (MINOCIN) 100 MG capsule Take 1 capsule (100 mg) by mouth 2 times daily 60 capsule 2     nystatin (MYCOSTATIN) 212532 UNIT/GM external cream Apply topically 2 times daily 30 g 4     oxyCODONE-acetaminophen (PERCOCET) 5-325 MG tablet Take 1 tablet by mouth every 6 hours as needed for severe pain Fill/begin 4/4/2022. 30 day script 45 tablet 0     RISEdronate (ACTONEL) 35 MG tablet Take 1 tablet (35 mg) by mouth every 7 days 12 tablet 1     traZODone (DESYREL) 100 MG tablet TAKE 2 TABLETS BY MOUTH AT BEDTIME 180 tablet 2       Patient Active Problem List   Diagnosis     Insomnia     CARDIOVASCULAR SCREENING; LDL GOAL LESS THAN 130     Hypertension goal BP (blood pressure) < 140/90     Anxiety     Severe persistent asthma without complication     History of colonic polyps     Clavicle fracture     Closed displaced fracture of proximal phalanx of left great toe     Chest wall pain     Thoracic back pain     Advanced directives, counseling/discussion     Pulmonary nodules       Past Medical History:   Diagnosis Date     Generalized anxiety disorder      Hypertension goal BP (blood pressure) < 140/90      Insomnia      Intermittent asthma      Menopausal state      Osteopenia        Past Surgical History:   Procedure Laterality Date     COLONOSCOPY       COLONOSCOPY  5/20/2014    Procedure: COLONOSCOPY;  Surgeon: Jean Hastings MD;  Location: [facility]     COLONOSCOPY  5/20/2014    Procedure: COMBINED COLONOSCOPY, SINGLE BIOPSY/POLYPECTOMY BY BIOPSY;  Surgeon: Jean Hastings MD;  Location: [facility]     COLONOSCOPY N/A 5/5/2017    Procedure: COMBINED COLONOSCOPY, SINGLE OR MULTIPLE BIOPSY/POLYPECTOMY BY BIOPSY;;  Surgeon: Duane, William Charles, MD;  Location: [facility] OR     COLONOSCOPY WITH CO2  INSUFFLATION N/A 2017    Procedure: COLONOSCOPY WITH CO2 INSUFFLATION;  colonoscopy, History of colonic polyps  BMI 25.74  [employer], fax: [phone];  Surgeon: Duane, William Charles, MD;  Location: [facility] OR     COSMETIC SURGERY       FINGER SURGERY      Left 4th digit     HYSTERECTOMY, JONAS       SURGICAL HISTORY OF -       Right 2nd digit     TONSILLECTOMY         Family History   Problem Relation Age of Onset     Respiratory Mother         copd     Respiratory Father         empysema     Melanoma No family hx of        Social History     Tobacco Use     Smoking status: Former Smoker     Packs/day: 0.00     Years: 20.00     Pack years: 0.00     Start date: 1980     Quit date: 2001     Years since quittin.3     Smokeless tobacco: Never Used   Substance Use Topics     Alcohol use: Yes     Alcohol/week: 10.0 standard drinks         Exam:    Vitals: There were no vitals taken for this visit.  BMI: There is no height or weight on file to calculate BMI.  Height: Data Unavailable    Constitutional/ general:  Pt is in no apparent distress, appears well-nourished.  Cooperative with history and physical exam.     Psych:  The patient answered questions appropriately.  Normal affect.  Seems to have reasonable expectations, in terms of treatment.     Lungs:  Non labored breathing, non labored speech. No cough.  No audible wheezing. Even, quiet breathing.       Vascular:  positive pedal pulses bilaterally for both the DP and PT arteries.  CFT < 3 sec.  positive ankle edema.  positive pedal hair growth.    Neuro:  Alert and oriented x 3. Coordinated gait.  Light touch sensation is intact     Derm: Normal texture and turgor.  No erythema, ecchymosis, or cyanosis.      Musculoskeletal:    Lower extremity muscle strength is normal.  Patient is ambulatory without an assistive device or brace.  No gross deformities.  Normal arch.  Pain upon palpation left second metatarsal neck but no pain third  and fourth metatarsal necks.  No ecchymosis.  Edema present but decreased.  No erythema.  Toes in good alignment.  No pain at the tarsometatarsal joint.  No pain on the fifth or first rays.    Radiographic Exam: X-rays show left 2nd-3 4 metatarsal neck fractures with some lateral deviation and increased bone callus formation.  No change in position from last x-ray.    Assessment: Left second third and fourth metatarsal necks fractures    Plan:  X-rays taken today of left foot.  Discussed fractures stable with bone callus formation.  We put CAM Walker at 90 degrees.  We will continue to walk this for 2 more weeks.  2 weeks we will start trying to graduate into a shoe for 2 hours of first day.  If pain and swelling back to cam walker.  If no pain or swelling will slowly increase 1 to 2 hours/day.  Return to the clinic in 3 weeks.  Fracture care.        Kayden Bravo DPM, FACFAS        Again, thank you for allowing me to participate in the care of your patient.        Sincerely,        Kayden Bravo DPM

## 2022-04-14 NOTE — PROGRESS NOTES
Subjective:    3/14/22   Pt is seen today in consult from Dr. Kemp for left metatarsal fractures.  On March 8, 2022 patient fell and hurt her left foot.  Had swelling bruising and pain and not able to walk on this.  Was seen in clinic and instructed to wear a walking boot.  She has been wearing this and its helping.  Pain swelling slightly better.  Denies weakness or increased deformity.    3/24/22 patient is 2 weeks 2 days status post left second third fourth metatarsal neck fractures on March 8, 2022.   Patient states swelling decreased.  Still has pain when walking.  Sadly her  passed away March 17, 2022.  Patient has been walking more because of this.    4/14/22   patient is 5 weeks 2 days status post left second third fourth metatarsal neck fractures on March eighth 2022.  States swelling and pain decreasing.  Recently tried walking and shoe around house and swelling and pain returned.  Has no other new complaints.      ROS: See above         Allergies   Allergen Reactions     Butrans [Buprenorphine]      Felt angry, increased rib pain, trouble breathing and nausea     Gabapentin      Nausea, issues breathing     Lisinopril      angioedema       Current Outpatient Medications   Medication Sig Dispense Refill     albuterol (PROAIR HFA/PROVENTIL HFA/VENTOLIN HFA) 108 (90 Base) MCG/ACT inhaler Inhale 2 puffs into the lungs every 4 hours as needed for shortness of breath / dyspnea 17 g 3     ALPRAZolam (XANAX) 0.25 MG tablet Take 1 tablet (0.25 mg) by mouth 3 times daily as needed for anxiety (1 tablet 30-60 minutes before your flight) 6 tablet 0     citalopram (CELEXA) 20 MG tablet Take 1 tablet (20 mg) by mouth daily 90 tablet 2     fluticasone-vilanterol (BREO ELLIPTA) 200-25 MCG/INH inhaler Inhale 1 puff by mouth once daily 180 each 0     losartan-hydrochlorothiazide (HYZAAR) 50-12.5 MG tablet Take 1 tablet by mouth daily 90 tablet 3     minocycline (MINOCIN) 100 MG capsule Take 1 capsule (100  mg) by mouth 2 times daily 60 capsule 2     nystatin (MYCOSTATIN) 833065 UNIT/GM external cream Apply topically 2 times daily 30 g 4     oxyCODONE-acetaminophen (PERCOCET) 5-325 MG tablet Take 1 tablet by mouth every 6 hours as needed for severe pain Fill/begin 4/4/2022. 30 day script 45 tablet 0     RISEdronate (ACTONEL) 35 MG tablet Take 1 tablet (35 mg) by mouth every 7 days 12 tablet 1     traZODone (DESYREL) 100 MG tablet TAKE 2 TABLETS BY MOUTH AT BEDTIME 180 tablet 2       Patient Active Problem List   Diagnosis     Insomnia     CARDIOVASCULAR SCREENING; LDL GOAL LESS THAN 130     Hypertension goal BP (blood pressure) < 140/90     Anxiety     Severe persistent asthma without complication     History of colonic polyps     Clavicle fracture     Closed displaced fracture of proximal phalanx of left great toe     Chest wall pain     Thoracic back pain     Advanced directives, counseling/discussion     Pulmonary nodules       Past Medical History:   Diagnosis Date     Generalized anxiety disorder      Hypertension goal BP (blood pressure) < 140/90      Insomnia      Intermittent asthma      Menopausal state      Osteopenia        Past Surgical History:   Procedure Laterality Date     COLONOSCOPY       COLONOSCOPY  5/20/2014    Procedure: COLONOSCOPY;  Surgeon: Jean Hastings MD;  Location: [facility]     COLONOSCOPY  5/20/2014    Procedure: COMBINED COLONOSCOPY, SINGLE BIOPSY/POLYPECTOMY BY BIOPSY;  Surgeon: Jean Hastings MD;  Location: [facility]     COLONOSCOPY N/A 5/5/2017    Procedure: COMBINED COLONOSCOPY, SINGLE OR MULTIPLE BIOPSY/POLYPECTOMY BY BIOPSY;;  Surgeon: Duane, William Charles, MD;  Location: [facility] OR     COLONOSCOPY WITH CO2 INSUFFLATION N/A 5/5/2017    Procedure: COLONOSCOPY WITH CO2 INSUFFLATION;  colonoscopy, History of colonic polyps  BMI 25.74  [facility], fax: [phone];  Surgeon: Duane, William Charles, MD;  Location: [facility] OR     COSMETIC SURGERY       FINGER SURGERY       Left 4th digit     HYSTERECTOMY, JONAS       SURGICAL HISTORY OF -       Right 2nd digit     TONSILLECTOMY         Family History   Problem Relation Age of Onset     Respiratory Mother         copd     Respiratory Father         empysema     Melanoma No family hx of        Social History     Tobacco Use     Smoking status: Former Smoker     Packs/day: 0.00     Years: 20.00     Pack years: 0.00     Start date: 1980     Quit date: 2001     Years since quittin.3     Smokeless tobacco: Never Used   Substance Use Topics     Alcohol use: Yes     Alcohol/week: 10.0 standard drinks         Exam:    Vitals: There were no vitals taken for this visit.  BMI: There is no height or weight on file to calculate BMI.  Height: Data Unavailable    Constitutional/ general:  Pt is in no apparent distress, appears well-nourished.  Cooperative with history and physical exam.     Psych:  The patient answered questions appropriately.  Normal affect.  Seems to have reasonable expectations, in terms of treatment.     Lungs:  Non labored breathing, non labored speech. No cough.  No audible wheezing. Even, quiet breathing.       Vascular:  positive pedal pulses bilaterally for both the DP and PT arteries.  CFT < 3 sec.  positive ankle edema.  positive pedal hair growth.    Neuro:  Alert and oriented x 3. Coordinated gait.  Light touch sensation is intact     Derm: Normal texture and turgor.  No erythema, ecchymosis, or cyanosis.      Musculoskeletal:    Lower extremity muscle strength is normal.  Patient is ambulatory without an assistive device or brace.  No gross deformities.  Normal arch.  Pain upon palpation left second metatarsal neck but no pain third and fourth metatarsal necks.  No ecchymosis.  Edema present but decreased.  No erythema.  Toes in good alignment.  No pain at the tarsometatarsal joint.  No pain on the fifth or first rays.    Radiographic Exam: X-rays show left 2nd-3 4 metatarsal neck fractures with some  lateral deviation and increased bone callus formation.  No change in position from last x-ray.    Assessment: Left second third and fourth metatarsal necks fractures    Plan:  X-rays taken today of left foot.  Discussed fractures stable with bone callus formation.  We put CAM Walker at 90 degrees.  We will continue to walk this for 2 more weeks.  2 weeks we will start trying to graduate into a shoe for 2 hours of first day.  If pain and swelling back to cam walker.  If no pain or swelling will slowly increase 1 to 2 hours/day.  Return to the clinic in 3 weeks.  Fracture care.        Kayden Bravo, MARJORIE, FACFAS

## 2022-04-14 NOTE — PATIENT INSTRUCTIONS
We wish you continued good healing. If you have any questions or concerns, please do not hesitate to contact us at  [phone]    [employer] (secure e-mail communication and access to your chart) to send a message or to make an appointment.    Please remember to call and schedule a follow up appointment if one was recommended at your earliest convenience.     PODIATRY CLINIC HOURS  TELEPHONE NUMBER    Dr. Kayden IRWINPLEILANI [facility]        Clinics:  [facility] Angel Reyes CMA   Tuesday 1PM-6PM  [place]  Wednesday 745AM-330PM  [place]  Thursday/Friday 745AM-230PM  [facility] APPOINTMENTS  [phone]    [place] APPOINTMENTS  [phone]        If you need a medication refill, please contact us you may need lab work and/or a follow up visit prior to your refill (i.e. Antifungal medications).  If MRI needed please call Imaging at [phone] or [phone]  HOW DO I GET MY KNEE SCOOTER? Knee scooters can be picked up at ANY Medical Supply stores with your knee scooter Prescription.  OR  Bring your signed prescription to an [facility] showroom.

## 2022-04-29 ENCOUNTER — OFFICE VISIT (OUTPATIENT)
Dept: PALLIATIVE MEDICINE | Facility: CLINIC | Age: 67
End: 2022-04-29
Payer: COMMERCIAL

## 2022-04-29 VITALS — DIASTOLIC BLOOD PRESSURE: 94 MMHG | SYSTOLIC BLOOD PRESSURE: 154 MMHG | HEART RATE: 96 BPM

## 2022-04-29 DIAGNOSIS — M54.50 CHRONIC LEFT-SIDED LOW BACK PAIN WITHOUT SCIATICA: ICD-10-CM

## 2022-04-29 DIAGNOSIS — F41.9 ANXIETY DUE TO INVASIVE PROCEDURE: ICD-10-CM

## 2022-04-29 DIAGNOSIS — S32.028D OTHER CLOSED FRACTURE OF SECOND LUMBAR VERTEBRA WITH ROUTINE HEALING, SUBSEQUENT ENCOUNTER: ICD-10-CM

## 2022-04-29 DIAGNOSIS — G89.29 CHRONIC LEFT-SIDED LOW BACK PAIN WITHOUT SCIATICA: ICD-10-CM

## 2022-04-29 DIAGNOSIS — F11.90 CHRONIC, CONTINUOUS USE OF OPIOIDS: ICD-10-CM

## 2022-04-29 DIAGNOSIS — M70.62 TROCHANTERIC BURSITIS OF LEFT HIP: Primary | ICD-10-CM

## 2022-04-29 DIAGNOSIS — G89.29 CHRONIC INTRACTABLE PAIN: ICD-10-CM

## 2022-04-29 PROCEDURE — 99214 OFFICE O/P EST MOD 30 MIN: CPT | Performed by: NURSE PRACTITIONER

## 2022-04-29 RX ORDER — DIAZEPAM 2 MG
TABLET ORAL
Qty: 1 TABLET | Refills: 0 | Status: SHIPPED | OUTPATIENT
Start: 2022-04-29 | End: 2022-09-06

## 2022-04-29 RX ORDER — OXYCODONE AND ACETAMINOPHEN 5; 325 MG/1; MG/1
1 TABLET ORAL EVERY 6 HOURS PRN
Qty: 45 TABLET | Refills: 0 | Status: SHIPPED | OUTPATIENT
Start: 2022-04-29 | End: 2022-07-05

## 2022-04-29 ASSESSMENT — PAIN SCALES - GENERAL: PAINLEVEL: MODERATE PAIN (5)

## 2022-04-29 NOTE — PROGRESS NOTES
[facility]    4/29/2022      PEG Score 1/14/2022 4/29/2022   PEG Total Score 7.67 5       Chief complaint:   left sided low back pain, left SI joint pain  and left lateral hip pain  -her lateral left hip pain is the most bothersome    Interval history:  Ludy Ramos is a 66 year old female is known to me for:  -Chronic left SI joint pain  -SI joint dysfunction of left side  -Greater trochanteric bursitis of the left hip  -Chronic left sided low back pain without sciatica  -Closed fracture of first lumbar vertebra, unspecified fracture morphology/sequela  -Compression fracture of T12 vertebra, sequela  -PMHx includes: menopausal state, insomnia, osteopenia, intermittent asthma, generalized anxiety disorder, HTN  -PSHx includes: tonsillectomy, hysterectomy, right 2nd digit surgery, left 4th digit finger surgery, colonoscopy x 5, cosmetic surgery  .    Recommendations/plan at the last visit on 1/14/2022 included:  1. Physical Therapy: not at present  2. I am in support of you joining [employer] at the gym  3. Clinical Health Psychologist to address issues of relaxation, behavioral change, coping style, and other factors important to improvement: none  4. Diagnostic Studies: none  5. Medication Management:   1. I am OK with you using oxycodone/acetminophen 1-2 tabs per day as you are.# 45/month. Fill/begin 1/14/2022 to last 30 days  2. Trial Voltaren gel 5% over-the-counter. May use 4 grams up to 4 times daily for lateral left hip pain   6. Further procedures recommended: none   1. Call me if you wish to try a left hip bursal injection  7. Acupuncture: none  8. Recommendations/follow-up for PCP:  See above  9. Release of information: none  10. Follow up: 8-12 weeks this can be video or in-person, your choice. Please call [phone] to make your follow-up appointment with me.             Since her last visit, Ludy Ramos reports:    Interval history April 29, 2022  -had left  foot fracture in the interim. Was in cast, then in boot, now in own shoe. Left foot is still achy.   -has had a lot of trauma since I saw her last. Her spouse was hospitalized on 3/4 and  on 3/17/2022 due to COVID. He was being actively being treated for maintenance cancer treatments.   -he contracted COVID in [facility], he was quarantined in the [facility] and sent home still very ill.   -Ludy is still having left lateral hip a pain that is the most bothersome still  -she is also stressed because she will need to move.         Interval history 2022  -her left lateral hip pain is the most bothersome  -still has pain on the left lower back over SI joint, still improved some after injection in October.   -she did not tolerate Butrans due to side effects, caused increased rib pain, nausea and issues with breathing that went away after removal of the patch.      Interval history 2021  -she did not feel well in [place] as it really bothered her left lateral hip. She had to bring out her chair to sit and rest.   -If she is walking on treadmill it seems better but she has not been able to do any elevation on her treadmill.  -she has no anterior hip joint pain.   -she started having more pain when she was taking gabapentin, it went away when she stopped taking gabapentin.     Pain history collected at initial consultation on 9/15/2021  Low back pain on the left side began about 2 years ago after a lumbar vertebral fracture after a fall. She has osteopenia. She has had a recent MRI yesterday and will be having a  cortisone injection in the left hip on 2021.         At this point, the patient's participation with our multidisciplinary team includes:  The patient has been compliant with the program.  PT - has not helped  Health Psych - none      Pain scores:  Pain intensity on average is 5-6 on a scale of 0-10.    Range is 5-8/10.  Pain right now is 5/10.   Pain is  "described as \"achy constant pain.\"    Pain is intermittent in nature    Current pain relevant medications:   -oxycodone/acetaminophen 5/325mg take 1 tab Q 6 hours PRN pain don't take within 6 hours of alprazolam (helpful, use once per day every day)       Other pertinent medications:  -alprazolam 0.25mg take 1 tab TID PRN anxiety, take 30-60 minutes prior to flight (uses rarely for flying)  -Celexa 20mg every day (helpful)  -trazodone 100mg take 2 tabs at bedtime (helpful for sleep)           Previous medication treatments included:  OPIATES: Percocet (helpful), butrans (had significant side effects with 5mcg/hr, anxiety, worse pain, issues with breathing)  NSAIDS: Nabumetone (not helpful), ibuprofen (not helpful), Aleve (not helpful)  MUSCLE RELAXANTS: cyclobenzaprine (not helpful)  ANTI-MIGRAINE MEDS: none  ANTI-DEPRESSANTS: Celexa (helpful), trazodone (helpful)  SLEEP AIDS: none  ANTI-CONVULSANTS: gabapentin (side effects, nausea, felt she had breathing issues)  TOPICALS: Voltaren gel (not helpful), Lidocaine patch (not helpful)  Other meds: Tylenol (not helpful)  Medical Cannabis (not helpful)        Other treatments have included:  Ludy LUANA Marxer has been seen at a pain clinic in the past.  See here by me for neck pain in 2016  PT: tried for neck pain, not helpful  Chiropractic care: none  Acupuncture: none  TENs Unit: tried, not helpful for back pain       Injections:   Has had 2 previous injections in the left hip, many years ago. Neither were helpful  -10/18/2021 left SI joint injection and left trochanteric bursal injection with Dr. Sara Chinchilla  (SI joint injection was somewhat helpful, the left greater trochanteric bursal injection did not help very much. She felt a lot of pressure during the injections and does not wish to do them again)        Side Effects: Gabapentin increased her pain, went away when she stopped taking it  Patient is using the medication as prescribed: NO    Medications:  Current " Outpatient Medications   Medication Sig Dispense Refill     albuterol (PROAIR HFA/PROVENTIL HFA/VENTOLIN HFA) 108 (90 Base) MCG/ACT inhaler Inhale 2 puffs into the lungs every 4 hours as needed for shortness of breath / dyspnea 17 g 3     citalopram (CELEXA) 20 MG tablet Take 1 tablet (20 mg) by mouth daily 90 tablet 2     diazepam (VALIUM) 2 MG tablet Take 1 tablet 30-60 minutes prior to left greater trochanteric bursal injection. Do not drive for 24 hours after taking this medication 1 tablet 0     fluticasone-vilanterol (BREO ELLIPTA) 200-25 MCG/INH inhaler Inhale 1 puff by mouth once daily 180 each 0     minocycline (MINOCIN) 100 MG capsule Take 1 capsule (100 mg) by mouth 2 times daily 60 capsule 2     nystatin (MYCOSTATIN) 669269 UNIT/GM external cream Apply topically 2 times daily 30 g 4     oxyCODONE-acetaminophen (PERCOCET) 5-325 MG tablet Take 1 tablet by mouth every 6 hours as needed for severe pain Fill on 5/2/2022 and begin 5/4/2022. 30 day script 45 tablet 0     RISEdronate (ACTONEL) 35 MG tablet Take 1 tablet (35 mg) by mouth every 7 days 12 tablet 1     traZODone (DESYREL) 100 MG tablet TAKE 2 TABLETS BY MOUTH AT BEDTIME 180 tablet 2     ALPRAZolam (XANAX) 0.25 MG tablet Take 1 tablet (0.25 mg) by mouth 3 times daily as needed for anxiety (1 tablet 30-60 minutes before your flight) (Patient not taking: Reported on 4/29/2022) 6 tablet 0     losartan-hydrochlorothiazide (HYZAAR) 50-12.5 MG tablet Take 1 tablet by mouth daily 90 tablet 0       Medical History: any changes in medical history since they were last seen? 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   Tobacco use: quit smoking > 20 years ago  Alcohol use: has an alcoholic drink once per week.   Drug use: marijuana years ago  History of chemical dependency treatment: none       Is patient a current smoker or tobacco user?  no  If yes, was cessation counseling  offered?  no          Physical Exam:  Vital signs: Blood pressure (!) 154/94, pulse 96, not currently breastfeeding.    Behavioral observations:  Awake, alert, cooperative    Gait:  Slow    Musculoskeletal exam:    Lumbar ROM WNL  SI joints Non-tender bilaterally  Piriformis Non-tender bilaterally   GTs are tender on the left side    Neuro exam:  deferred    Skin/vascular/autonomic:  No suspicious lesions on exposed skin.     Other:  na          Diagnostic tests:  LUMBAR SPINE TWO-THREE  VIEWS  6/24/2020 10:10 AM      HISTORY: lower vertebral tenderness on exam after trauma, hx of  osteopenia; Acute right-sided low back pain without sciatica     COMPARISON: Film dated 11/23/2010     FINDINGS: There are age indeterminant compression fractures of T12 in  the superior endplate of L2. These are new since 11/23/2010. There is  loss of disc space height at L5-S1..                                                                      IMPRESSION:   1. Age indeterminant compression fractures of T12 and L1. These are  new since 2010.  2. Degenerative change at L5-S1.     RADHA BROWN MD           PELVIS AND HIP BILATERAL TWO VIEWS   5/7/2021 12:16 PM      HISTORY:  Bilateral hip pain.     FINDINGS:  Mild lower lumbar spine degenerative change.                                                                      IMPRESSION: Moderate left hip joint space narrowing superomedially.  Unremarkable right hip. No fracture identified.      EUGENIO SALAZAR MD           Exam: MRI of the left hip dated 9/13/2021.     COMPARISON: Radiographs dated 5/7/2021.     CLINICAL HISTORY: Hip pain.     TECHNIQUE: Multiplanar, multisequence MR imaging of the left hip was  obtained using standard sequences in 3 orthogonal planes without the  use of intravenous or intra-articular gadolinium contrast.     FINDINGS:     No significant fluid at the left hip joint.     No marrow signal abnormalities to suggest fracture, osteonecrosis, or  marrow  infiltration.     Likely diffuse cartilage thinning which is not well evaluated. No  subchondral edema. The labrum is poorly evaluated.     The neurovascular structures are unremarkable. No lymphadenopathy. The  sciatic nerve is unremarkable. No full-thickness tendon tear or tendon  retraction. Tendinosis of the hamstring tendons at their origin on the  ischial tuberosity with insertional tendinopathy of the gluteus  minimus and gluteus and medius tendons. Diverticulosis without  evidence of diverticulitis.                                                                      IMPRESSION:  1. No significant left hip joint effusion.  2. No marrow signal abnormalities in the left hip to suggest fracture,  osteonecrosis, or marrow infiltration.  3. Likely diffuse cartilage thinning, not well visualized. No definite  full-thickness cartilage loss or subchondral edema.     PIEDAD MONROE MD                  Other testing (labs, diagnostics):  5/13/2019  Cr. 0.80  Est GFR 77        Screening tools:      DIRE Score for ongoing opioid management is calculated as follows:     Diagnosis = 2     Intractability = 2     Risk: Psych = 2  Chem Hlth = 2  Reliability = 2  Social = 2     Efficacy = 2     Total DIRE Score = 14 (14 or higher predicts good candidate for ongoing opioid management; 13 or lower predicts poor candidate for opioid management)            [place] Pres[place] Monitoring [place]:  Reviewed [place]  4/29/2022- no concerning fills.  Raisa URBINA, RN CNP, FNP  [facility]  [place] location          Assessment:   1. Left greater trochanteric bursitis  2. Chronic left sided low back pain without sciatica  3. Anxiety due to (upcoming) invasive procedure  4. Other closed fracture of 2nd lumbar vertebra with routine  healing, subsequent encounter  5. Chronic  intractable pain  6. Chronic continuous use of opioids    7. Compression fracture of T12 vertebra, sequela  8. Last urine drug  screen done 11/16/2021 and was as expected.   9. Controlled substance agreement signed on 11/16/2021  10. Patient has no history of overuse with me, no aberrancies while I have been prescribing.   7. PMHx includes: menopausal state, insomnia, osteopenia, intermittent asthma, generalized anxiety disorder, HTN  8. PSHx includes: tonsillectomy, hysterectomy, right 2nd digit surgery, left 4th digit finger surgery, colonoscopy x 5, cosmetic surgery        Plan:   1. Physical Therapy: not at present  2. I am in support of you joining [employer] at the gym  3. Clinical Health Psychologist to address issues of relaxation, behavioral change, coping style, and other factors important to improvement: none  4. Diagnostic Studies: none  5. Medication Management:   1. I am OK with you using oxycodone/acetminophen 1-2 tabs per day as you are.# 45/month. Fill 5/2 and start 5/4  to last 30 days  2. Trial Voltaren gel 5% over-the-counter. May use 4 grams up to 4 times daily for lateral left hip pain   6. Further procedures recommended: call our clinic to schedule left greater trochanteric bursal injection  1. Take Valium 2mg 30-60 minutes prior to injection  7. Acupuncture: none  8. Recommendations/follow-up for PCP:  See above  9. Release of information: none  10. Follow up: 8-12 weeks this can be video or in-person, your choice. Please call [phone] to make your follow-up appointment with me.   11. I know you are planning on moving to [place]. I can make sure that you have a one month supply of Percocet when you leave, then you must find a provider in [place].        Face to face time: 28 minutes      Raisa URBINA, JAIME CNP, FNP  [facility]  [facility]

## 2022-04-29 NOTE — PATIENT INSTRUCTIONS
Plan:   Physical Therapy: not at present  I am in support of you joining [employer] at the gym  Clinical Health Psychologist to address issues of relaxation, behavioral change, coping style, and other factors important to improvement: none  Diagnostic Studies: none  Medication Management:   I am OK with you using oxycodone/acetminophen 1-2 tabs per day as you are.# 45/month. Fill 5/2 and start 5/4  to last 30 days  Trial Voltaren gel 5% over-the-counter. May use 4 grams up to 4 times daily for lateral left hip pain   Further procedures recommended: call our clinic to schedule left greater trochanteric bursal injection. Dr Capo Partida would be the injectionist here in [place]. Call [phone] when you are ready to schedule this.   Take Valium 2mg 30-60 minutes prior to injection  Acupuncture: none  Recommendations/follow-up for PCP:  See above  Release of information: none  Follow up: 8-12 weeks this can be video or in-person, your choice. Please call [phone] to make your follow-up appointment with me.   I know you are planning on moving to [place]. I can make sure that you have a one month supply of Percocet when you leave, then you must find a provider in [place].    ----------------------------------------------------------------  Clinic Number:  [phone]   Call with any questions about your care and for scheduling assistance.   Calls are returned Monday through Friday between 8 AM and 4:30 PM. We usually get back to you within 2 business days depending on the issue/request.    If we are prescribing your medications:  For opioid medication refills, call the clinic or send a [employer] message 7 days in advance.  Please include:  Name of requested medication  Name of the pharmacy.  For non-opioid medications, call your pharmacy directly to request a refill. Please allow 3-4 days to be processed.   Per [place] State Law:  All controlled substance prescriptions must be filled within 30 days of being written.     For those controlled substances allowing refills, pickup must occur within 30 days of last fill.      We believe regular attendance is key to your success in our program!    Any time you are unable to keep your appointment we ask that you call us at least 24 hours in advance to cancel.This will allow us to offer the appointment time to another patient.   Multiple missed appointments may lead to dismissal from the clinic.

## 2022-05-03 DIAGNOSIS — I10 ESSENTIAL HYPERTENSION: ICD-10-CM

## 2022-05-03 RX ORDER — LOSARTAN POTASSIUM AND HYDROCHLOROTHIAZIDE 12.5; 5 MG/1; MG/1
1 TABLET ORAL DAILY
Qty: 90 TABLET | Refills: 0 | Status: SHIPPED | OUTPATIENT
Start: 2022-05-03 | End: 2022-08-05

## 2022-05-03 NOTE — TELEPHONE ENCOUNTER
"Routing refill request to provider for review/approval because:  Requested Prescriptions   Pending Prescriptions Disp Refills    losartan-hydrochlorothiazide (HYZAAR) 50-12.5 MG tablet [Pharmacy Med Name: Losartan Potassium-HCTZ Oral Tablet 50-12.5 MG] 90 tablet 0     Sig: Take 1 tablet by mouth daily        Angiotensin-II Receptors Failed - 5/3/2022  2:00 AM        Failed - Last blood pressure under 140/90 in past 12 months       BP Readings from Last 3 Encounters:   04/29/22 (!) 154/94   03/24/22 (!) 148/78   03/09/22 (!) 155/78                 Passed - Recent (12 mo) or future (30 days) visit within the authorizing provider's specialty     Patient has had an office visit with the authorizing provider or a provider within the authorizing providers department within the previous 12 mos or has a future within next 30 days. See \"Patient Info\" tab in inbasket, or \"Choose Columns\" in Meds & Orders section of the refill encounter.              Passed - Medication is active on med list        Passed - Patient is age 18 or older        Passed - No active pregnancy on record        Passed - Normal serum creatinine on file in past 12 months     Recent Labs   Lab Test 12/16/21  1453   CR 0.80       Ok to refill medication if creatinine is low          Passed - Normal serum potassium on file in past 12 months       Recent Labs   Lab Test 12/16/21  1453   POTASSIUM 4.1                    Passed - No positive pregnancy test in past 12 months       Diuretics (Including Combos) Protocol Failed - 5/3/2022  2:00 AM        Failed - Blood pressure under 140/90 in past 12 months       BP Readings from Last 3 Encounters:   04/29/22 (!) 154/94   03/24/22 (!) 148/78   03/09/22 (!) 155/78                 Passed - Recent (12 mo) or future (30 days) visit within the authorizing provider's specialty     Patient has had an office visit with the authorizing provider or a provider within the authorizing providers department within the previous " "12 mos or has a future within next 30 days. See \"Patient Info\" tab in inbasket, or \"Choose Columns\" in Meds & Orders section of the refill encounter.              Passed - Medication is active on med list        Passed - Patient is age 18 or older        Passed - No active pregancy on record        Passed - Normal serum creatinine on file in past 12 months       Recent Labs   Lab Test 12/16/21  1453   CR 0.80              Passed - Normal serum potassium on file in past 12 months       Recent Labs   Lab Test 12/16/21  1453   POTASSIUM 4.1                    Passed - Normal serum sodium on file in past 12 months       Recent Labs   Lab Test 12/16/21  1453                 Passed - No positive pregnancy test in past 12 months                Tegan LIZARRAGAN, RN        "

## 2022-05-07 ENCOUNTER — HEALTH MAINTENANCE LETTER (OUTPATIENT)
Age: 67
End: 2022-05-07

## 2022-06-28 DIAGNOSIS — G89.29 CHRONIC LEFT-SIDED LOW BACK PAIN WITHOUT SCIATICA: ICD-10-CM

## 2022-06-28 DIAGNOSIS — M70.62 TROCHANTERIC BURSITIS OF LEFT HIP: ICD-10-CM

## 2022-06-28 DIAGNOSIS — M54.50 CHRONIC LEFT-SIDED LOW BACK PAIN WITHOUT SCIATICA: ICD-10-CM

## 2022-06-28 DIAGNOSIS — S32.028D OTHER CLOSED FRACTURE OF SECOND LUMBAR VERTEBRA WITH ROUTINE HEALING, SUBSEQUENT ENCOUNTER: ICD-10-CM

## 2022-06-28 NOTE — TELEPHONE ENCOUNTER
Reason for Call:  Medication or medication refill:    Do you use a [facility]?  Name of the pharmacy and phone number for the current request:  [facility] [place] [facility]    Name of the medication requested: Percocet    Other request: Patient has 3 pills left.    Can we leave a detailed message on this number? YES    Phone number patient can be reached at: Home number on file [phone] (home)    Best Time: Any    Call taken on 6/28/2022 at 5:12 PM by Kelli Urban

## 2022-07-05 RX ORDER — OXYCODONE AND ACETAMINOPHEN 5; 325 MG/1; MG/1
1 TABLET ORAL EVERY 6 HOURS PRN
Qty: 45 TABLET | Refills: 0 | Status: SHIPPED | OUTPATIENT
Start: 2022-07-05 | End: 2022-08-08

## 2022-07-05 NOTE — TELEPHONE ENCOUNTER
Signed Prescriptions:                        Disp   Refills    oxyCODONE-acetaminophen (PERCOCET) 5-325 M*45 tab*0        Sig: Take 1 tablet by mouth every 6 hours as needed for           severe pain Fill on 07/05/22 and begin 07/05/22.           30 day script  Authorizing Provider: RAISA ARRIAGA    Reviewed [place]  July 5, 2022- no concerning fills.    Raisa Arriaga APRN, RN CNP, FNP  [facility]  [facility]

## 2022-07-05 NOTE — TELEPHONE ENCOUNTER
Appears that this was sent directly to me.     Please review and prep as appropriate.     Thanks.  Raisa URBINA, JAIME CNP, FNP  [facility]

## 2022-07-05 NOTE — TELEPHONE ENCOUNTER
Received call from patient  requesting refill(s) for oxyCODONE-acetaminophen (PERCOCET) 5-325 MG tablet      Last dispensed from pharmacy on 5/2/22    Patient's last office/virtual visit by prescribing provider on 4/29/22  Next office/virtual appointment scheduled for none    Last urine drug screen date 11/16/21  Current opioid agreement on file? Yes Date of opioid agreement: 11/16/21    E-prescribe to:    [id number] - [place], [place] - [address]    Will route to nursing [place] for review and preparation of prescription(s).

## 2022-07-05 NOTE — TELEPHONE ENCOUNTER
Please process a refill of oxyCODONE-acetaminophen (PERCOCET) 5-325 MG tablet to     [id number] - [place] - [address]  [address] [address]  Phone: [phone] Fax: [phone]

## 2022-07-05 NOTE — TELEPHONE ENCOUNTER
Medication refill information reviewed. Patient is scheduled 09/06/22    Due date for oxyCODONE-acetaminophen (PERCOCET) 5-325 MG tablet is 07/05/22     Prescriptions prepped for review.     Will route to provider.

## 2022-07-06 ENCOUNTER — OFFICE VISIT (OUTPATIENT)
Dept: PALLIATIVE MEDICINE | Facility: CLINIC | Age: 67
End: 2022-07-06
Attending: NURSE PRACTITIONER
Payer: COMMERCIAL

## 2022-07-06 VITALS — SYSTOLIC BLOOD PRESSURE: 141 MMHG | HEART RATE: 96 BPM | DIASTOLIC BLOOD PRESSURE: 83 MMHG

## 2022-07-06 DIAGNOSIS — M70.62 TROCHANTERIC BURSITIS OF LEFT HIP: ICD-10-CM

## 2022-07-06 PROCEDURE — 20610 DRAIN/INJ JOINT/BURSA W/O US: CPT | Mod: LT | Performed by: PAIN MEDICINE

## 2022-07-06 RX ADMIN — BUPIVACAINE HYDROCHLORIDE 50 MG: 5 INJECTION, SOLUTION EPIDURAL; INTRACAUDAL at 14:57

## 2022-07-06 RX ADMIN — TRIAMCINOLONE ACETONIDE 40 MG: 40 INJECTION, SUSPENSION INTRA-ARTICULAR; INTRAMUSCULAR at 14:57

## 2022-07-06 ASSESSMENT — PAIN SCALES - GENERAL: PAINLEVEL: SEVERE PAIN (6)

## 2022-07-06 NOTE — PATIENT INSTRUCTIONS
[facility]  Post Procedure Instructions    Today you had:   bursa injection      Medications usedbupivicaine   kenolog        Go to the emergency room if you develop any shortness of breath  Monitor the injection sites for signs and symptoms of infection-fever, chills, redness, swelling, warmth, or drainage to areas.  You may have soreness at injection sites for up to 24 hours.  It may take up to 14 days for the steroid medication to start working although you may feel the effect as early as a few days after the procedure.     You may apply ice to the painful areas to help minimize the discomfort of the needle pokes.  Do not apply heat to sites for at least 12 hours.  You may use anti-inflammatory medications or Tylenol for pain control if necessary    Pain Clinic phone number during work hours (Monday through Friday 8 am-4:30 pm) at [phone] or the Provider Line after hours at [phone]:

## 2022-07-06 NOTE — PROGRESS NOTES
Pre procedure Diagnosis: left hip pain, trochanteric bursitis   Post procedure Diagnosis: Same  Procedure performed: left hip greater trochanteric bursa injection  Indication: Therapeutic for pain  Anesthesia: none  Complications: none  Operators: Capo Partida MD      Indications:    Pt was sent for treatment of chronic pain. The patient has a history of chronic left hip pain. Exam shows tenderness to palpation at the left greater trochanter. Other conservative treatments prior to injection include physical therapy, medications, and prior injections.     Options/alternatives, benefits and risks were discussed with the patient including bleeding, infection, tissue trauma, exposure to radiation, reaction to medications including seizure, nerve injury, weakness, and numbness. Questions were answered to their satisfaction and they agreed to proceed. Voluntary informed consent was obtained and signed.      Vitals were reviewed: Yes  There were no vitals taken for this visit.  Allergies were reviewed: Yes   Medications were reviewed: Yes   Pre-procedure pain score: 8/10     Procedure:  After obtaining signed informed consent, the patient was positioned in a rightlateral decubitus position.  A Pause for the Cause was performed.      After identifying the point of maximal tenderness at the left greater trochanter, the area was prepped in the usual sterile fashion. Then, a 27 gauge 3.5 inch spinal needle was advanced to contact bone at the left greater trochanter with positive pain reproduction and withdrawn 1-2 mm. Aspiration was negative. Then, 5 ml of a combination of kenalog 40 mg and Bupivicaine 0.5% 4.5 ml was injected into the bursa and the needle was withdrawn. The injection site was cleaned and a bandaid was placed.       The patient tolerated the procedure well, and was taken to the recovery room.      Post-procedure pain score: 6/10  Follow-up includes:   -f/u phone call in one week  -f/u with PCP and in the  pain clinic as scheduled     Capo Partida MD  [facility] [place]

## 2022-07-26 DIAGNOSIS — J45.40 MODERATE PERSISTENT ASTHMA, UNCOMPLICATED: ICD-10-CM

## 2022-08-01 RX ORDER — TRIAMCINOLONE ACETONIDE 40 MG/ML
40 INJECTION, SUSPENSION INTRA-ARTICULAR; INTRAMUSCULAR ONCE
Status: COMPLETED | OUTPATIENT
Start: 2022-07-06 | End: 2022-07-06

## 2022-08-01 RX ORDER — BUPIVACAINE HYDROCHLORIDE 5 MG/ML
10 INJECTION, SOLUTION EPIDURAL; INTRACAUDAL ONCE
Status: COMPLETED | OUTPATIENT
Start: 2022-07-06 | End: 2022-07-06

## 2022-08-05 DIAGNOSIS — I10 ESSENTIAL HYPERTENSION: ICD-10-CM

## 2022-08-05 RX ORDER — LOSARTAN POTASSIUM AND HYDROCHLOROTHIAZIDE 12.5; 5 MG/1; MG/1
1 TABLET ORAL DAILY
Qty: 90 TABLET | Refills: 0 | Status: SHIPPED | OUTPATIENT
Start: 2022-08-05 | End: 2022-09-20

## 2022-09-06 ENCOUNTER — LAB (OUTPATIENT)
Dept: LAB | Facility: CLINIC | Age: 67
End: 2022-09-06
Payer: COMMERCIAL

## 2022-09-06 ENCOUNTER — OFFICE VISIT (OUTPATIENT)
Dept: PALLIATIVE MEDICINE | Facility: CLINIC | Age: 67
End: 2022-09-06

## 2022-09-06 VITALS — HEART RATE: 91 BPM | DIASTOLIC BLOOD PRESSURE: 92 MMHG | OXYGEN SATURATION: 100 % | SYSTOLIC BLOOD PRESSURE: 172 MMHG

## 2022-09-06 DIAGNOSIS — F11.90 CHRONIC, CONTINUOUS USE OF OPIOIDS: ICD-10-CM

## 2022-09-06 DIAGNOSIS — M54.50 CHRONIC LEFT-SIDED LOW BACK PAIN WITHOUT SCIATICA: ICD-10-CM

## 2022-09-06 DIAGNOSIS — Z79.891 ENCOUNTER FOR LONG-TERM USE OF OPIATE ANALGESIC: ICD-10-CM

## 2022-09-06 DIAGNOSIS — S32.028D OTHER CLOSED FRACTURE OF SECOND LUMBAR VERTEBRA WITH ROUTINE HEALING, SUBSEQUENT ENCOUNTER: ICD-10-CM

## 2022-09-06 DIAGNOSIS — G89.29 CHRONIC LEFT-SIDED LOW BACK PAIN WITHOUT SCIATICA: ICD-10-CM

## 2022-09-06 DIAGNOSIS — M70.62 TROCHANTERIC BURSITIS OF LEFT HIP: Primary | ICD-10-CM

## 2022-09-06 LAB
CREAT UR-MCNC: 70 MG/DL
ETHANOL UR QL SCN: NORMAL

## 2022-09-06 PROCEDURE — 80307 DRUG TEST PRSMV CHEM ANLYZR: CPT | Mod: 90

## 2022-09-06 PROCEDURE — 80307 DRUG TEST PRSMV CHEM ANLYZR: CPT

## 2022-09-06 PROCEDURE — 80320 DRUG SCREEN QUANTALCOHOLS: CPT

## 2022-09-06 PROCEDURE — 99214 OFFICE O/P EST MOD 30 MIN: CPT | Performed by: NURSE PRACTITIONER

## 2022-09-06 PROCEDURE — 80321 ALCOHOLS BIOMARKERS 1OR 2: CPT | Mod: 90

## 2022-09-06 PROCEDURE — 99000 SPECIMEN HANDLING OFFICE-LAB: CPT

## 2022-09-06 RX ORDER — OXYCODONE AND ACETAMINOPHEN 5; 325 MG/1; MG/1
1 TABLET ORAL EVERY 6 HOURS PRN
Qty: 60 TABLET | Refills: 0 | Status: SHIPPED | OUTPATIENT
Start: 2022-09-06 | End: 2022-09-25

## 2022-09-06 ASSESSMENT — PAIN SCALES - GENERAL: PAINLEVEL: SEVERE PAIN (7)

## 2022-09-06 NOTE — PATIENT INSTRUCTIONS
Plan:   Physical Therapy: not at present  I am in support of you joining [employer] at the gym  Clinical Health Psychologist to address issues of relaxation, behavioral change, coping style, and other factors important to improvement: none  Diagnostic Studies: none  Medication Management:   I am OK with you using oxycodone/acetminophen 1-2 tabs per day as you are.# 60/month. Fill 9/6 and start 9/8  Trial Voltaren gel 5% over-the-counter. May use 4 grams up to 4 times daily for lateral left hip pain   Further procedures recommended: none  Acupuncture: none  Recommendations/follow-up for PCP:  See above  Release of information: none  Re-sign CSA today  Urine drug screen today, last oxycodone last night  Follow up: 8-12 weeks this can be video or in-person, your choice. Please call [phone] to make your follow-up appointment with me.   I know you are planning on moving to [place]. I can make sure that you have a one month supply of Percocet when you leave, then you must find a provider in [place]    ----------------------------------------------------------------  Clinic Number:  [phone]   Call with any questions about your care and for scheduling assistance.   Calls are returned Monday through Friday between 8 AM and 4:30 PM. We usually get back to you within 2 business days depending on the issue/request.    If we are prescribing your medications:  For opioid medication refills, call the clinic or send a An[employer] message 7 days in advance.  Please include:  Name of requested medication  Name of the pharmacy.  For non-opioid medications, call your pharmacy directly to request a refill. Please allow 3-4 days to be processed.   Per [place] State Law:  All controlled substance prescriptions must be filled within 30 days of being written.    For those controlled substances allowing refills, pickup must occur within 30 days of last fill.      We believe regular attendance is key to your success in our program!    Any  time you are unable to keep your appointment we ask that you call us at least 24 hours in advance to cancel.This will allow us to offer the appointment time to another patient.   Multiple missed appointments may lead to dismissal from the clinic.

## 2022-09-06 NOTE — LETTER
Opioid / Opioid Plus Controlled Substance Agreement    This is an agreement between you and your provider about the safe and appropriate use of controlled substance/opioids prescribed by your care team. Controlled substances are medicines that can cause physical and mental dependence (abuse).    There are strict laws about having and using these medicines. We here at [facility] are committing to working with you in your efforts to get better. To support you in this work, we ll help you schedule regular office appointments for medicine refills. If we must cancel or change your appointment for any reason, we ll make sure you have enough medicine to last until your next appointment.     As a Provider, I will:    Listen carefully to your concerns and treat you with respect.     Recommend a treatment plan that I believe is in your best interest. This plan may involve therapies other than opioid pain medication.     Talk with you often about the possible benefits, and the risk of harm of any medicine that we prescribe for you.     Provide a plan on how to taper (discontinue or go off) using this medicine if the decision is made to stop its use.    As a Patient, I understand that opioid(s):     Are a controlled substance prescribed by my care team to help me function or work and manage my condition(s).     Are strong medicines and can cause serious side effects such as:    Drowsiness, which can seriously affect my driving ability    A lower breathing rate, enough to cause death    Harm to my thinking ability     Depression     Abuse of and addiction to this medicine    Need to be taken exactly as prescribed. Combining opioids with certain medicines or chemicals (such as illegal drugs, sedatives, sleeping pills, and benzodiazepines) can be dangerous or even fatal. If I stop opioids suddenly, I may have severe withdrawal symptoms.    Do not work for all types of pain nor for all patients. If they re not helpful, I may  be asked to stop them.        The risks, benefits and side effects of these medicine(s) were explained to me. I agree that:  1. I will take part in other treatments as advised by my care team. This may be psychiatry or counseling, physical therapy, behavioral therapy, group treatment or a referral to a specialist.     2. I will keep all my appointments. I understand that this is part of the monitoring of opioids. My care team may require an office visit for EVERY opioid/controlled substance refill. If I miss appointments or don t follow instructions, my care team may stop my medicine.    3. I will take my medicines as prescribed. I will not change the dose or schedule unless my care team tells me to. There will be no refills if I run out early.     4. I may be asked to come to the clinic and complete a urine drug test or complete a pill count at any time. If I don t give a urine sample or participate in a pill count, the care team may stop my medicine.    5. I will only receive prescriptions from this clinic for chronic pain. If I am treated by another provider for acute pain issues, I will tell them that I am taking opioid pain medication for chronic pain and that I have a treatment agreement with this provider. I will inform my [facility] care team within one business day if I am given a prescription for any pain medication by another healthcare provider. My [facility] care team can contact other providers and pharmacists about my use of any medicines.    6. It is up to me to make sure that I don t run out of my medicines on weekends or holidays. If my care team is willing to refill my opioid prescription without a visit, I must request refills only during office hours. Refills may take up to 3 business days to process. I will use one pharmacy to fill all my opioid and other controlled substance prescriptions. I will notify the clinic about any changes to my insurance or medication  availability.    7. I am responsible for my prescriptions. If the medicine/prescription is lost, stolen or destroyed, it will not be replaced. I also agree not to share controlled substance medicines with anyone.    8. I am aware I should not use any illegal or recreational drugs. I agree not to drink alcohol unless my care team says I can.       9. If I enroll in the [place] Medical Cannabis program, I will tell my care team prior to my next refill.     10. I will tell my care team right away if I become pregnant, have a new medical problem treated outside of my regular clinic, or have a change in my medications.    11. I understand that this medicine can affect my thinking, judgment and reaction time. Alcohol and drugs affect the brain and body, which can affect the safety of my driving. Being under the influence of alcohol or drugs can affect my decision-making, behaviors, personal safety, and the safety of others. Driving while impaired (DWI) can occur if a person is driving, operating, or in physical control of a car, motorcycle, boat, snowmobile, ATV, motorbike, off-road vehicle, or any other motor vehicle ([place] Statute 169A.20). I understand the risk if I choose to drive or operate any vehicle or machinery.    I understand that if I do not follow any of the conditions above, my prescriptions or treatment may be stopped or changed.          Opioids  What You Need to Know    What are opioids?   Opioids are pain medicines that must be prescribed by a doctor. They are also known as narcotics.     Examples are:   1. morphine (MS Contin, Janny)  2. oxycodone (Oxycontin)  3. oxycodone and acetaminophen (Percocet)  4. hydrocodone and acetaminophen (Vicodin, Norco)   5. fentanyl patch (Duragesic)   6. hydromorphone (Dilaudid)   7. methadone  8. codeine (Tylenol #3)     What do opioids do well?   Opioids are best for severe short-term pain such as after a surgery or injury. They may work well for cancer pain. They may  help some people with long-lasting (chronic) pain.     What do opioids NOT do well?   Opioids never get rid of pain entirely, and they don t work well for most patients with chronic pain. Opioids don t reduce swelling, one of the causes of pain.                                    Other ways to manage chronic pain and improve function include:       Treat the health problem that may be causing pain    Anti-inflammation medicines, which reduce swelling and tenderness, such as ibuprofen (Advil, Motrin) or naproxen (Aleve)    Acetaminophen (Tylenol)    Antidepressants and anti-seizure medicines, especially for nerve pain    Topical treatments such as patches or creams    Injections or nerve blocks    Chiropractic or osteopathic treatment    Acupuncture, massage, deep breathing, meditation, visual imagery, aromatherapy    Use heat or ice at the pain site    Physical therapy     Exercise    Stop smoking    Take part in therapy       Risks and side effects     Talk to your doctor before you start or decide to keep taking opioids. Possible side effects include:      Lowering your breathing rate enough to cause death    Overdose, including death, especially if taking higher than prescribed doses    Worse depression symptoms; less pleasure in things you usually enjoy    Feeling tired or sluggish    Slower thoughts or cloudy thinking    Being more sensitive to pain over time; pain is harder to control    Trouble sleeping or restless sleep    Changes in hormone levels (for example, less testosterone)    Changes in sex drive or ability to have sex    Constipation    Unsafe driving    Itching and sweating    Dizziness    Nausea, throwing up and dry mouth    What else should I know about opioids?    Opioids may lead to dependence, tolerance, or addiction.      Dependence means that if you stop or reduce the medicine too quickly, you will have withdrawal symptoms. These include loose poop (diarrhea), jitters, flu-like symptoms,  nervousness and tremors. Dependence is not the same as addiction.                       Tolerance means needing higher doses over time to get the same effect. This may increase the chance of serious side effects.      Addiction is when people improperly use a substance that harms their body, their mind or their relations with others. Use of opiates can cause a relapse of addiction if you have a history of drug or alcohol abuse.      People who have used opioids for a long time may have a lower quality of life, worse depression, higher levels of pain and more visits to doctors.    You can overdose on opioids. Take these steps to lower your risk of overdose:    1. Recognize the signs:  Signs of overdose include decrease or loss of consciousness (blackout), slowed breathing, trouble waking up and blue lips. If someone is worried about overdose, they should call 911.    2. Talk to your doctor about Narcan (naloxone).   If you are at risk for overdose, you may be given a prescription for Narcan. This medicine very quickly reverses the effects of opioids.   If you overdose, a friend or family member can give you Narcan while waiting for the ambulance. They need to know the signs of overdose and how to give Narcan.     3. Don't use alcohol or street drugs.   Taking them with opioids can cause death.    4. Do not take any of these medicines unless your doctor says it s OK. Taking these with opioids can cause death:    Benzodiazepines, such as lorazepam (Ativan), alprazolam (Xanax) or diazepam (Valium)    Muscle relaxers, such as cyclobenzaprine (Flexeril)    Sleeping pills like zolpidem (Ambien)     Other opioids      How to keep you and other people safe while taking opioids:    1. Never share your opioids with others.  Opioid medicines are regulated by the Drug Enforcement Agency ([place]). Selling or sharing medications is a criminal act.    2. Be sure to store opioids in a secure place, locked up if possible. Young children  can easily swallow them and overdose.    3. When you are traveling with your medicines, keep them in the original bottles. If you use a pill box, be sure you also carry a copy of your medicine list from your clinic or pharmacy.    4. Safe disposal of opioids    Most pharmacies have places to get rid of medicine, called disposal kiosks. Medicine disposal options are also available in every [facility]. Search your county and  medication disposal  to find more options. You can find more details at:  [email or web address] [email or web address]-green/[email or web address]-medications     I agree that my provider, clinic care team, and pharmacy may work with any city, state or federal law enforcement agency that investigates the misuse, sale, or other diversion of my controlled medicine. I will allow my provider to discuss my care with, or share a copy of, this agreement with any other treating provider, pharmacy or emergency room where I receive care.    I have read this agreement and have asked questions about anything I did not understand.    _______________________________________________________  Patient Signature - Ludy Ramos _____________________                   Date     _______________________________________________________  Provider Signature - CARLITOS Lange CNP   _____________________                   Date     _______________________________________________________  Witness Signature (required if provider not present while patient signing)   _____________________                   Date

## 2022-09-06 NOTE — PROGRESS NOTES
[facility]    9/6/2022        Chief complaint:   left sided low back pain, left SI joint pain  and left lateral hip pain  -her lateral left hip pain is the most bothersome    Interval history:  Ludy Ramos is a 66 year old female is known to me for:  -Chronic left SI joint pain  -SI joint dysfunction of left side  -Greater trochanteric bursitis of the left hip  -Chronic left sided low back pain without sciatica  -Closed fracture of first lumbar vertebra, unspecified fracture morphology/sequela  -Compression fracture of T12 vertebra, sequela  -PMHx includes: menopausal state, insomnia, osteopenia, intermittent asthma, generalized anxiety disorder, HTN  -PSHx includes: tonsillectomy, hysterectomy, right 2nd digit surgery, left 4th digit finger surgery, colonoscopy x 5, cosmetic surgery  .    Recommendations/plan at the last visit on 4/29/2022 included:  1. Physical Therapy: not at present  2. I am in support of you joining [employer] at the gym  3. Clinical Health Psychologist to address issues of relaxation, behavioral change, coping style, and other factors important to improvement: none  4. Diagnostic Studies: none  5. Medication Management:   1. I am OK with you using oxycodone/acetminophen 1-2 tabs per day as you are.# 45/month. Fill 5/2 and start 5/4  to last 30 days  2. Trial Voltaren gel 5% over-the-counter. May use 4 grams up to 4 times daily for lateral left hip pain   6. Further procedures recommended: call our clinic to schedule left greater trochanteric bursal injection  1. Take Valium 2mg 30-60 minutes prior to injection  7. Acupuncture: none  8. Recommendations/follow-up for PCP:  See above  9. Release of information: none  10. Follow up: 8-12 weeks this can be video or in-person, your choice. Please call [phone] to make your follow-up appointment with me.   11. I know you are planning on moving to [place]. I can make sure that you have a one month supply of  Percocet when you leave, then you must find a provider in [place].   .       Since her last visit, Ludy Ramos reports:    Interval history 2022  -she found a home in [place] in the [facility] area and will be moving there. Packing her things currently,   -left sided low back pain remains over the SI joint and lateral hip pain.   -left GT injection in July not beneficial  -using medical cannabis regularly  -she is moving by herself, it is exciting and scary all at once.   -she was  in 2022, still grieving  -stress between her and her sister      Interval history 2022  -had left foot fracture in the interim. Was in cast, then in boot, now in own shoe. Left foot is still achy.   -has had a lot of trauma since I saw her last. Her spouse was hospitalized on 3/4 and  on 3/17/2022 due to COVID. He was being actively being treated for maintenance cancer treatments.   -he contracted COVID in [facility], he was quarantined in the [facility] and sent home still very ill.   -Ludy is still having left lateral hip a pain that is the most bothersome still  -she is also stressed because she will need to move.         Interval history 2022  -her left lateral hip pain is the most bothersome  -still has pain on the left lower back over SI joint, still improved some after injection in October.   -she did not tolerate Butrans due to side effects, caused increased rib pain, nausea and issues with breathing that went away after removal of the patch.      Interval history 2021  -she did not feel well in [place] as it really bothered her left lateral hip. She had to bring out her chair to sit and rest.   -If she is walking on treadmill it seems better but she has not been able to do any elevation on her treadmill.  -she has no anterior hip joint pain.   -she started having more pain when she was taking gabapentin, it went away when she stopped taking  "gabapentin.     Pain history collected at initial consultation on 9/15/2021  Low back pain on the left side began about 2 years ago after a lumbar vertebral fracture after a fall. She has osteopenia. She has had a recent MRI yesterday and will be having a  cortisone injection in the left hip on 9/28/2021.         At this point, the patient's participation with our multidisciplinary team includes:  The patient has been compliant with the program.  PT - has not helped  Health Psych - none      Pain scores:  Pain intensity on average is 6-7 on a scale of 0-10.    Range is 2-10/10.  Pain right now is 7/10.   Pain is described as \"achy constant pain.\"    Pain is intermittent in nature    Current pain relevant medications:   -oxycodone/acetaminophen 5/325mg take 1 tab Q 6 hours PRN pain don't take within 6 hours of alprazolam (helpful, use once per day every day)       Other pertinent medications:  -alprazolam 0.25mg take 1 tab TID PRN anxiety, take 30-60 minutes prior to flight (uses rarely for flying)  -Celexa 20mg every day (helpful)  -trazodone 100mg take 2 tabs at bedtime (helpful for sleep)           Previous medication treatments included:  OPIATES: Percocet (helpful), butrans (had significant side effects with 5mcg/hr, anxiety, worse pain, issues with breathing)  NSAIDS: Nabumetone (not helpful), ibuprofen (not helpful), Aleve (not helpful)  MUSCLE RELAXANTS: cyclobenzaprine (not helpful)  ANTI-MIGRAINE MEDS: none  ANTI-DEPRESSANTS: Celexa (helpful), trazodone (helpful)  SLEEP AIDS: none  ANTI-CONVULSANTS: gabapentin (side effects, nausea, felt she had breathing issues)  TOPICALS: Voltaren gel (not helpful), Lidocaine patch (not helpful)  Other meds: Tylenol (not helpful)  Medical Cannabis (not helpful)        Other treatments have included:  Ludy Ramos has been seen at a pain clinic in the past.  See here by me for neck pain in 2016  PT: tried for neck pain, not helpful  Chiropractic care: none  Acupuncture: " none  TENs Unit: tried, not helpful for back pain       Injections:   Has had 2 previous injections in the left hip, many years ago. Neither were helpful  -10/18/2021 left SI joint injection and left trochanteric bursal injection with Dr. Sara Chinchilla  (SI joint injection was somewhat helpful, the left greater trochanteric bursal injection did not help very much. She felt a lot of pressure during the injections and does not wish to do them again)  - 7/6/2022 left hip greater trochanteric bursal injection with Dr. Capo Partida (no benefit)        Side Effects: none  Patient is using the medication as prescribed:   yes    Medications:  Current Outpatient Medications   Medication Sig Dispense Refill     albuterol (PROAIR HFA/PROVENTIL HFA/VENTOLIN HFA) 108 (90 Base) MCG/ACT inhaler Inhale 2 puffs into the lungs every 4 hours as needed for shortness of breath / dyspnea 17 g 3     nystatin (MYCOSTATIN) 726139 UNIT/GM external cream Apply topically 2 times daily 30 g 4     oxyCODONE-acetaminophen (PERCOCET) 5-325 MG tablet Take 1 tablet by mouth every 6 hours as needed for severe pain Fill on 9/6/22 and begin 9/8/22. 30 day script 60 tablet 0     ALPRAZolam (XANAX) 0.25 MG tablet Take 1 tablet (0.25 mg) by mouth 3 times daily as needed for anxiety (1 tablet 30-60 minutes before your flight) (Patient not taking: No sig reported) 6 tablet 0     BREO ELLIPTA 200-25 MCG/INH Inhaler Inhale 1 puff by mouth once daily 180 each 0     citalopram (CELEXA) 20 MG tablet Take 1 tablet (20 mg) by mouth daily 90 tablet 1     losartan-hydrochlorothiazide (HYZAAR) 50-12.5 MG tablet Take 1 tablet by mouth daily 90 tablet 0     minocycline (MINOCIN) 100 MG capsule Take 1 capsule (100 mg) by mouth 2 times daily (Patient not taking: Reported on 9/6/2022) 60 capsule 2     traZODone (DESYREL) 100 MG tablet TAKE 2 TABLETS BY MOUTH AT BEDTIME 180 tablet 0       Medical History: any changes in medical history since they were last seen?  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   Tobacco use: quit smoking > 20 years ago  Alcohol use: has an alcoholic drink once per week.   Drug use: marijuana years ago  History of chemical dependency treatment: none       Is patient a current smoker or tobacco user?  no  If yes, was cessation counseling offered?  no          Physical Exam:  Vital signs: Blood pressure (!) 172/92, pulse 91, SpO2 100 %, not currently breastfeeding.    Behavioral observations:  Awake, alert, cooperative    Gait:  Slow    Musculoskeletal exam:   Strength grossly equal throughout      Neuro exam:  Deferred    Skin/vascular/autonomic:  No suspicious lesions on exposed skin.     Other:  na          Diagnostic tests:  LUMBAR SPINE TWO-THREE  VIEWS  6/24/2020 10:10 AM      HISTORY: lower vertebral tenderness on exam after trauma, hx of  osteopenia; Acute right-sided low back pain without sciatica     COMPARISON: Film dated 11/23/2010     FINDINGS: There are age indeterminant compression fractures of T12 in  the superior endplate of L2. These are new since 11/23/2010. There is  loss of disc space height at L5-S1..                                                                      IMPRESSION:   1. Age indeterminant compression fractures of T12 and L1. These are  new since 2010.  2. Degenerative change at L5-S1.     RADHA BROWN MD           PELVIS AND HIP BILATERAL TWO VIEWS   5/7/2021 12:16 PM      HISTORY:  Bilateral hip pain.     FINDINGS:  Mild lower lumbar spine degenerative change.                                                                      IMPRESSION: Moderate left hip joint space narrowing superomedially.  Unremarkable right hip. No fracture identified.      EUGENIO SALAZAR MD           Exam: MRI of the left hip dated 9/13/2021.     COMPARISON: Radiographs dated 5/7/2021.     CLINICAL HISTORY: Hip pain.     TECHNIQUE: Multiplanar, multisequence MR  imaging of the left hip was  obtained using standard sequences in 3 orthogonal planes without the  use of intravenous or intra-articular gadolinium contrast.     FINDINGS:     No significant fluid at the left hip joint.     No marrow signal abnormalities to suggest fracture, osteonecrosis, or  marrow infiltration.     Likely diffuse cartilage thinning which is not well evaluated. No  subchondral edema. The labrum is poorly evaluated.     The neurovascular structures are unremarkable. No lymphadenopathy. The  sciatic nerve is unremarkable. No full-thickness tendon tear or tendon  retraction. Tendinosis of the hamstring tendons at their origin on the  ischial tuberosity with insertional tendinopathy of the gluteus  minimus and gluteus and medius tendons. Diverticulosis without  evidence of diverticulitis.                                                                      IMPRESSION:  1. No significant left hip joint effusion.  2. No marrow signal abnormalities in the left hip to suggest fracture,  osteonecrosis, or marrow infiltration.  3. Likely diffuse cartilage thinning, not well visualized. No definite  full-thickness cartilage loss or subchondral edema.     PIEDAD MONROE MD                  Other testing (labs, diagnostics):  12/16/2021  Cr. 0.80  Est GFR 77        Screening tools:      DIRE Score for ongoing opioid management is calculated as follows:     Diagnosis = 2     Intractability = 2     Risk: Psych = 2  Chem Hlth = 2  Reliability = 2  Social = 2     Efficacy = 2     Total DIRE Score = 14 (14 or higher predicts good candidate for ongoing opioid management; 13 or lower predicts poor candidate for opioid management)            [place] Prescription Monitoring [place]:  Reviewed [place]  9/6/2022- no concerning fills.  Raisa URBINA, JAIME CNP, FNP  [facility]  [place] location          Assessment:   1. Left greater trochanteric bursitis  2. Chronic left sided low back pain  without sciatica  3. Other closed fracture of 2nd lumbar vertebra with routine  healing, subsequent encounter  4. Chronic continuous use of opioids  5. Encounter for long-term use of opioids    6. Compression fracture of T12 vertebra, sequela  7. Last urine drug screen done 11/16/2021 and was as expected.   8. Controlled substance agreement signed on 11/16/2021  9. Patient has no history of overuse with me, no aberrancies while I have been prescribing.   7. PMHx includes: menopausal state, insomnia, osteopenia, intermittent asthma, generalized anxiety disorder, HTN  8. PSHx includes: tonsillectomy, hysterectomy, right 2nd digit surgery, left 4th digit finger surgery, colonoscopy x 5, cosmetic surgery        Plan:   1. Physical Therapy: not at present  2. I am in support of you joining [employer] at the gym  3. Clinical Health Psychologist to address issues of relaxation, behavioral change, coping style, and other factors important to improvement: none  4. Diagnostic Studies: none  5. Medication Management:   1. I am OK with you using oxycodone/acetminophen 1-2 tabs per day as you are.# 60/month. Fill 9/6 and start 9/8  2. Trial Voltaren gel 5% over-the-counter. May use 4 grams up to 4 times daily for lateral left hip pain   6. Further procedures recommended: none  7. Acupuncture: none  8. Recommendations/follow-up for PCP:  See above  9. Release of information: none  10. Re-sign CSA today  11. Urine drug screen today, last oxycodone last night  12. Follow up: 8-12 weeks this can be video or in-person, your choice. Please call [phone] to make your follow-up appointment with me.   13. I know you are planning on moving to [place]. I can make sure that you have a one month supply of Percocet when you leave, then you must find a provider in [place].             Raisa URBINA, RN CNP, FNP  [facility]  [facility]

## 2022-09-06 NOTE — NURSING NOTE
The opioid contract was signed.  Pt was given a copy. The signed one was placed in bin to be scanned into epic.    Pt was sent down to lab for the urine drug screen.     JAIME Cespedes-BSN  [facility]

## 2022-09-07 DIAGNOSIS — F41.1 GAD (GENERALIZED ANXIETY DISORDER): ICD-10-CM

## 2022-09-07 LAB — ETHYL GLUCURONIDE UR QL SCN: POSITIVE NG/ML

## 2022-09-07 RX ORDER — CITALOPRAM HYDROBROMIDE 20 MG/1
20 TABLET ORAL DAILY
Qty: 90 TABLET | Refills: 1 | Status: SHIPPED | OUTPATIENT
Start: 2022-09-07

## 2022-09-08 LAB
OXYCODONE UR CFM-MCNC: 125 NG/ML
OXYCODONE/CREAT UR: 179 NG/MG {CREAT}

## 2022-09-09 LAB
ETHYL GLUCURONIDE UR CFM-MCNC: NORMAL NG/ML
ETHYL SULFATE UR CFM-MCNC: NORMAL NG/ML

## 2022-09-20 DIAGNOSIS — G47.00 PERSISTENT INSOMNIA: ICD-10-CM

## 2022-09-20 DIAGNOSIS — I10 ESSENTIAL HYPERTENSION: ICD-10-CM

## 2022-09-20 DIAGNOSIS — M80.00XS AGE-RELATED OSTEOPOROSIS WITH CURRENT PATHOLOGICAL FRACTURE, SEQUELA: ICD-10-CM

## 2022-09-20 DIAGNOSIS — J45.40 MODERATE PERSISTENT ASTHMA, UNCOMPLICATED: ICD-10-CM

## 2022-09-20 RX ORDER — TRAZODONE HYDROCHLORIDE 100 MG/1
TABLET ORAL
Qty: 180 TABLET | Refills: 0 | Status: SHIPPED | OUTPATIENT
Start: 2022-09-20

## 2022-09-20 RX ORDER — LOSARTAN POTASSIUM AND HYDROCHLOROTHIAZIDE 12.5; 5 MG/1; MG/1
1 TABLET ORAL DAILY
Qty: 90 TABLET | Refills: 0 | Status: SHIPPED | OUTPATIENT
Start: 2022-09-20

## 2022-09-20 RX ORDER — RISEDRONATE SODIUM 35 MG/1
35 TABLET, FILM COATED ORAL
Qty: 12 TABLET | Refills: 0 | OUTPATIENT
Start: 2022-09-20

## 2022-09-20 NOTE — LETTER
September 20, 2022    Ludy Ramos  [address]  [place] [address]    Dear Ludy,       We recently received a refill request for trazodone and breo ellipta.  We have refilled this for a one time 90 day supply only because you are due for a:    Medicare wellness exam office visit      Please call at your earliest convenience so that there will not be a delay with your future refills.          Thank you,   Your [facility] Team/  [phone]

## 2022-09-20 NOTE — TELEPHONE ENCOUNTER
Refilled, needs appt to establish with a new provider since Sypura is now gone, over due for Medicare wellness. Louisa Lewis, CNP

## 2022-09-25 ENCOUNTER — MYC REFILL (OUTPATIENT)
Dept: PALLIATIVE MEDICINE | Facility: CLINIC | Age: 67
End: 2022-09-25

## 2022-09-25 DIAGNOSIS — M54.50 CHRONIC LEFT-SIDED LOW BACK PAIN WITHOUT SCIATICA: ICD-10-CM

## 2022-09-25 DIAGNOSIS — S32.028D OTHER CLOSED FRACTURE OF SECOND LUMBAR VERTEBRA WITH ROUTINE HEALING, SUBSEQUENT ENCOUNTER: ICD-10-CM

## 2022-09-25 DIAGNOSIS — M70.62 TROCHANTERIC BURSITIS OF LEFT HIP: ICD-10-CM

## 2022-09-25 DIAGNOSIS — G89.29 CHRONIC LEFT-SIDED LOW BACK PAIN WITHOUT SCIATICA: ICD-10-CM

## 2022-09-26 RX ORDER — OXYCODONE AND ACETAMINOPHEN 5; 325 MG/1; MG/1
1 TABLET ORAL EVERY 6 HOURS PRN
Qty: 60 TABLET | Refills: 0 | Status: SHIPPED | OUTPATIENT
Start: 2022-09-26

## 2022-09-26 NOTE — TELEPHONE ENCOUNTER
Signed Prescriptions:                        Disp   Refills    oxyCODONE-acetaminophen (PERCOCET) 5-325 M*60 tab*0        Sig: Take 1 tablet by mouth every 6 hours as needed for           severe pain Fill on 10/6/22 and begin 10/8/22. 30           day script  Authorizing Provider: RAISA ARRIAGA    Reviewed [place]  September 26, 2022- no concerning fills.    Raisa Arriaga APRN, RN CNP, FNP  [facility]  [facility]

## 2022-09-26 NOTE — TELEPHONE ENCOUNTER
Medication refill information reviewed.     Due date for oxyCODONE-acetaminophen (PERCOCET) 5-325 MG tablet is 10/08/22     Prescriptions prepped for review.     Will route to provider.

## 2022-09-26 NOTE — TELEPHONE ENCOUNTER
Refills have been requested for the following medications:         oxyCODONE-acetaminophen (PERCOCET) 5-325 MG tablet [Raisa N]       Patient Comment: Please refill.     Preferred pharmacy: [id number] - [place] - [id number]

## 2022-09-26 NOTE — TELEPHONE ENCOUNTER
Received call from patient requesting refill(s) of oxyCODONE-acetaminophen (PERCOCET) 5-325 MG tablet     Last dispensed from pharmacy on 09/07/2022    Patient's last office/virtual visit by prescribing provider on 09/06/2022  Next office/virtual appointment scheduled for None    Last urine drug screen date 09/06/2022  Current opioid agreement on file (completed within the last year) Yes Date of opioid agreement: 09/06/2022    E-prescribe to [id number] - [place] - [id number] [address]  pharmacy    Will route to nursing Ora for review and preparation of prescription(s).     Anna Rogers MA  [facility] [place]

## 2022-09-27 ENCOUNTER — MYC REFILL (OUTPATIENT)
Dept: FAMILY MEDICINE | Facility: CLINIC | Age: 67
End: 2022-09-27

## 2022-09-27 ENCOUNTER — MYC REFILL (OUTPATIENT)
Dept: PALLIATIVE MEDICINE | Facility: CLINIC | Age: 67
End: 2022-09-27

## 2022-09-27 DIAGNOSIS — G89.29 CHRONIC LEFT-SIDED LOW BACK PAIN WITHOUT SCIATICA: ICD-10-CM

## 2022-09-27 DIAGNOSIS — S32.028D OTHER CLOSED FRACTURE OF SECOND LUMBAR VERTEBRA WITH ROUTINE HEALING, SUBSEQUENT ENCOUNTER: ICD-10-CM

## 2022-09-27 DIAGNOSIS — M54.50 CHRONIC LEFT-SIDED LOW BACK PAIN WITHOUT SCIATICA: ICD-10-CM

## 2022-09-27 DIAGNOSIS — J45.40 MODERATE PERSISTENT ASTHMA, UNCOMPLICATED: ICD-10-CM

## 2022-09-27 DIAGNOSIS — M70.62 TROCHANTERIC BURSITIS OF LEFT HIP: ICD-10-CM

## 2022-09-27 RX ORDER — OXYCODONE AND ACETAMINOPHEN 5; 325 MG/1; MG/1
1 TABLET ORAL EVERY 6 HOURS PRN
Qty: 60 TABLET | Refills: 0 | Status: CANCELLED | OUTPATIENT
Start: 2022-09-27

## 2022-09-27 NOTE — TELEPHONE ENCOUNTER
Routing to provider to okay fill tomorrow to start 10/08/22. She is moving to [place] on 09/29/22.     I did verify with the pharmacist that this plan works okay for them.    Faustina Medina, ZIAN, RN  Care Coordinator  [facility] [place]

## 2022-09-27 NOTE — LETTER
September 28, 2022    Ludy Ramos  [address]  [place] [address]        Rober Boston,     We received a refill request for Breo.  We have sent a 90 day supply to your pharmacy.  You are due for your wellness exam/asthma check now along with several other screenings; Dr. Steinberg is no longer seeing patients at [facility].  Please choose a new primary provider and call now to schedule the above appointment. You will need to have a visit to get further refills.     You will need labs prior to your visit so please schedule that appointment as well.       You are due now for the asthma questions.  We have included the questions and you can return your answers via the envelope provided.         Your [facility] Team   [phone]

## 2022-09-27 NOTE — TELEPHONE ENCOUNTER
Refills have been requested for the following medications:         oxyCODONE-acetaminophen (PERCOCET) 5-325 MG tablet [Raisa N]       Patient Comment: I am leaving to [place] Thursday morning please refill my Percocets thank you     Preferred pharmacy: [id number] [place] - [address] [address]

## 2022-09-27 NOTE — TELEPHONE ENCOUNTER
"Prescription was signed by provider 09/26/22.    oxyCODONE-acetaminophen (PERCOCET) 5-325 MG tablet  Able to fill 10/06/22 and start 10/08/22      Patient is requesting an early refill.    \"Patient Comment: I am leaving to [place] Thursday morning please refill my Percocets thank you\"      Allyson Berumen MA  [facility]      "

## 2022-09-28 NOTE — TELEPHONE ENCOUNTER
Information noted.     Raisa URBINA, RN CNP, FNP  [facility]  [facility]

## 2022-09-28 NOTE — TELEPHONE ENCOUNTER
Please mail letter with ACT and return envelope      Rober Boston,    We received a refill request for Breo.  We have sent a 90 day supply to your pharmacy.  You are due for your wellness exam/asthma check now along with several other screenings; Dr. Steinberg is no longer seeing patients at [facility].  Please choose a new primary provider and call now to schedule the above appointment. You will need to have a visit to get further refills.    You will need labs prior to your visit so please schedule that appointment as well.      You are due now for the asthma questions.  We have included the questions and you can return your answers via the envelope provided.       Your [facility] Team

## 2023-01-08 ENCOUNTER — HEALTH MAINTENANCE LETTER (OUTPATIENT)
Age: 68
End: 2023-01-08

## 2023-06-02 ENCOUNTER — HEALTH MAINTENANCE LETTER (OUTPATIENT)
Age: 68
End: 2023-06-02

## 2023-08-31 NOTE — TELEPHONE ENCOUNTER
Need for Clarification.  Drug: Losartan/HCT 50-12.5mg tab  Pharmacy notes: This combo is unavailable to order. Can we get rx's for separate medications to fill for the pt. (Losartan 50/HCTZ 12.5)   Spoke with Shannon at [facility]   Patient being discharged today.   will cover home care  No further questions

## 2024-06-17 PROBLEM — Z71.89 ADVANCED DIRECTIVES, COUNSELING/DISCUSSION: Status: RESOLVED | Noted: 2017-03-30 | Resolved: 2024-06-17

## 2024-06-29 ENCOUNTER — HEALTH MAINTENANCE LETTER (OUTPATIENT)
Age: 69
End: 2024-06-29

## 2025-02-08 ENCOUNTER — HEALTH MAINTENANCE LETTER (OUTPATIENT)
Age: 70
End: 2025-02-08

## 2025-07-13 ENCOUNTER — HEALTH MAINTENANCE LETTER (OUTPATIENT)
Age: 70
End: 2025-07-13

## (undated) DEVICE — SOL WATER IRRIG 1000ML BOTTLE 07139-09

## (undated) RX ORDER — REGADENOSON 0.08 MG/ML
INJECTION, SOLUTION INTRAVENOUS
Status: DISPENSED
Start: 2021-03-04